# Patient Record
Sex: MALE | Race: WHITE | NOT HISPANIC OR LATINO | Employment: OTHER | URBAN - METROPOLITAN AREA
[De-identification: names, ages, dates, MRNs, and addresses within clinical notes are randomized per-mention and may not be internally consistent; named-entity substitution may affect disease eponyms.]

---

## 2017-03-20 DIAGNOSIS — Z12.5 ENCOUNTER FOR SCREENING FOR MALIGNANT NEOPLASM OF PROSTATE: ICD-10-CM

## 2017-03-22 ENCOUNTER — APPOINTMENT (OUTPATIENT)
Dept: LAB | Facility: CLINIC | Age: 82
End: 2017-03-22
Payer: MEDICARE

## 2017-03-22 ENCOUNTER — GENERIC CONVERSION - ENCOUNTER (OUTPATIENT)
Dept: OTHER | Facility: OTHER | Age: 82
End: 2017-03-22

## 2017-03-22 ENCOUNTER — TRANSCRIBE ORDERS (OUTPATIENT)
Dept: LAB | Facility: CLINIC | Age: 82
End: 2017-03-22

## 2017-03-22 DIAGNOSIS — Z12.5 ENCOUNTER FOR SCREENING FOR MALIGNANT NEOPLASM OF PROSTATE: ICD-10-CM

## 2017-03-22 LAB — PSA SERPL-MCNC: 3.1 NG/ML (ref 0–4)

## 2017-03-22 PROCEDURE — 36415 COLL VENOUS BLD VENIPUNCTURE: CPT

## 2017-03-22 PROCEDURE — G0103 PSA SCREENING: HCPCS

## 2017-04-05 ENCOUNTER — ALLSCRIPTS OFFICE VISIT (OUTPATIENT)
Dept: OTHER | Facility: OTHER | Age: 82
End: 2017-04-05

## 2017-04-10 ENCOUNTER — GENERIC CONVERSION - ENCOUNTER (OUTPATIENT)
Dept: OTHER | Facility: OTHER | Age: 82
End: 2017-04-10

## 2017-04-26 ENCOUNTER — GENERIC CONVERSION - ENCOUNTER (OUTPATIENT)
Dept: OTHER | Facility: OTHER | Age: 82
End: 2017-04-26

## 2017-06-09 ENCOUNTER — GENERIC CONVERSION - ENCOUNTER (OUTPATIENT)
Dept: OTHER | Facility: OTHER | Age: 82
End: 2017-06-09

## 2017-07-17 ENCOUNTER — ALLSCRIPTS OFFICE VISIT (OUTPATIENT)
Dept: OTHER | Facility: OTHER | Age: 82
End: 2017-07-17

## 2017-07-17 DIAGNOSIS — J84.9 INTERSTITIAL PULMONARY DISEASE (HCC): ICD-10-CM

## 2017-07-17 DIAGNOSIS — R05.9 COUGH: ICD-10-CM

## 2017-07-19 ENCOUNTER — TRANSCRIBE ORDERS (OUTPATIENT)
Dept: RADIOLOGY | Facility: CLINIC | Age: 82
End: 2017-07-19

## 2017-07-19 ENCOUNTER — APPOINTMENT (OUTPATIENT)
Dept: RADIOLOGY | Facility: CLINIC | Age: 82
End: 2017-07-19
Attending: DENTIST
Payer: MEDICARE

## 2017-07-19 DIAGNOSIS — R05.9 COUGH: ICD-10-CM

## 2017-07-19 PROCEDURE — 71020 HB CHEST X-RAY 2VW FRONTAL&LATL: CPT

## 2017-07-27 ENCOUNTER — HOSPITAL ENCOUNTER (OUTPATIENT)
Dept: RADIOLOGY | Age: 82
Discharge: HOME | End: 2017-07-27
Payer: MEDICARE

## 2017-07-27 DIAGNOSIS — J84.9 INTERSTITIAL PULMONARY DISEASE (HCC): ICD-10-CM

## 2017-07-27 PROCEDURE — 71250 CT THORAX DX C-: CPT

## 2017-07-31 ENCOUNTER — LAB CONVERSION - ENCOUNTER (OUTPATIENT)
Dept: OTHER | Facility: OTHER | Age: 82
End: 2017-07-31

## 2017-10-30 ENCOUNTER — GENERIC CONVERSION - ENCOUNTER (OUTPATIENT)
Dept: OTHER | Facility: OTHER | Age: 82
End: 2017-10-30

## 2017-12-18 ENCOUNTER — APPOINTMENT (OUTPATIENT)
Dept: LAB | Facility: CLINIC | Age: 82
End: 2017-12-18
Payer: MEDICARE

## 2017-12-18 ENCOUNTER — TRANSCRIBE ORDERS (OUTPATIENT)
Dept: LAB | Facility: CLINIC | Age: 82
End: 2017-12-18

## 2017-12-18 ENCOUNTER — GENERIC CONVERSION - ENCOUNTER (OUTPATIENT)
Dept: OTHER | Facility: OTHER | Age: 82
End: 2017-12-18

## 2017-12-18 DIAGNOSIS — R35.0 FREQUENCY OF MICTURITION: ICD-10-CM

## 2017-12-18 DIAGNOSIS — R79.89 OTHER SPECIFIED ABNORMAL FINDINGS OF BLOOD CHEMISTRY: ICD-10-CM

## 2017-12-18 DIAGNOSIS — R10.9 ABDOMINAL PAIN: ICD-10-CM

## 2017-12-18 PROCEDURE — 87086 URINE CULTURE/COLONY COUNT: CPT

## 2017-12-19 LAB — BACTERIA UR CULT: NORMAL

## 2018-01-05 ENCOUNTER — ALLSCRIPTS OFFICE VISIT (OUTPATIENT)
Dept: OTHER | Facility: OTHER | Age: 83
End: 2018-01-05

## 2018-01-05 ENCOUNTER — APPOINTMENT (OUTPATIENT)
Dept: LAB | Facility: CLINIC | Age: 83
End: 2018-01-05
Payer: MEDICARE

## 2018-01-05 ENCOUNTER — TRANSCRIBE ORDERS (OUTPATIENT)
Dept: LAB | Facility: CLINIC | Age: 83
End: 2018-01-05

## 2018-01-05 ENCOUNTER — APPOINTMENT (OUTPATIENT)
Dept: RADIOLOGY | Facility: CLINIC | Age: 83
End: 2018-01-05
Payer: MEDICARE

## 2018-01-05 DIAGNOSIS — R10.9 ABDOMINAL PAIN: ICD-10-CM

## 2018-01-05 LAB
ALBUMIN SERPL BCP-MCNC: 4 G/DL (ref 3.5–5)
ALP SERPL-CCNC: 49 U/L (ref 46–116)
ALT SERPL W P-5'-P-CCNC: 20 U/L (ref 12–78)
ANION GAP SERPL CALCULATED.3IONS-SCNC: 3 MMOL/L (ref 4–13)
AST SERPL W P-5'-P-CCNC: 12 U/L (ref 5–45)
BACTERIA UR QL AUTO: NORMAL /HPF
BILIRUB SERPL-MCNC: 0.6 MG/DL (ref 0.2–1)
BILIRUB UR QL STRIP: NEGATIVE
BUN SERPL-MCNC: 13 MG/DL (ref 5–25)
CALCIUM SERPL-MCNC: 9.8 MG/DL (ref 8.3–10.1)
CHLORIDE SERPL-SCNC: 106 MMOL/L (ref 100–108)
CLARITY UR: CLEAR
CO2 SERPL-SCNC: 31 MMOL/L (ref 21–32)
COLOR UR: YELLOW
CREAT SERPL-MCNC: 0.83 MG/DL (ref 0.6–1.3)
DEPRECATED D DIMER PPP: 511 NG/ML (FEU) (ref 0–424)
ERYTHROCYTE [DISTWIDTH] IN BLOOD BY AUTOMATED COUNT: 13.1 % (ref 11.6–15.1)
GFR SERPL CREATININE-BSD FRML MDRD: 83 ML/MIN/1.73SQ M
GLUCOSE SERPL-MCNC: 100 MG/DL (ref 65–140)
GLUCOSE UR STRIP-MCNC: NEGATIVE MG/DL
HCT VFR BLD AUTO: 42.3 % (ref 36.5–49.3)
HGB BLD-MCNC: 14.4 G/DL (ref 12–17)
HGB UR QL STRIP.AUTO: NEGATIVE
HYALINE CASTS #/AREA URNS LPF: NORMAL /LPF
KETONES UR STRIP-MCNC: NEGATIVE MG/DL
LEUKOCYTE ESTERASE UR QL STRIP: NEGATIVE
MCH RBC QN AUTO: 31.6 PG (ref 26.8–34.3)
MCHC RBC AUTO-ENTMCNC: 34 G/DL (ref 31.4–37.4)
MCV RBC AUTO: 93 FL (ref 82–98)
NITRITE UR QL STRIP: NEGATIVE
NON-SQ EPI CELLS URNS QL MICRO: NORMAL /HPF
PH UR STRIP.AUTO: 7.5 [PH] (ref 4.5–8)
PLATELET # BLD AUTO: 184 THOUSANDS/UL (ref 149–390)
PMV BLD AUTO: 8.6 FL (ref 8.9–12.7)
POTASSIUM SERPL-SCNC: 5.3 MMOL/L (ref 3.5–5.3)
PROT SERPL-MCNC: 7.9 G/DL (ref 6.4–8.2)
PROT UR STRIP-MCNC: NEGATIVE MG/DL
RBC # BLD AUTO: 4.56 MILLION/UL (ref 3.88–5.62)
RBC #/AREA URNS AUTO: NORMAL /HPF
SODIUM SERPL-SCNC: 140 MMOL/L (ref 136–145)
SP GR UR STRIP.AUTO: 1.01 (ref 1–1.03)
UROBILINOGEN UR QL STRIP.AUTO: 0.2 E.U./DL
WBC # BLD AUTO: 5.01 THOUSAND/UL (ref 4.31–10.16)
WBC #/AREA URNS AUTO: NORMAL /HPF

## 2018-01-05 PROCEDURE — 81001 URINALYSIS AUTO W/SCOPE: CPT

## 2018-01-05 PROCEDURE — 71046 X-RAY EXAM CHEST 2 VIEWS: CPT

## 2018-01-05 PROCEDURE — 80053 COMPREHEN METABOLIC PANEL: CPT

## 2018-01-05 PROCEDURE — 71100 X-RAY EXAM RIBS UNI 2 VIEWS: CPT

## 2018-01-05 PROCEDURE — 36415 COLL VENOUS BLD VENIPUNCTURE: CPT

## 2018-01-05 PROCEDURE — 85027 COMPLETE CBC AUTOMATED: CPT

## 2018-01-05 PROCEDURE — 85379 FIBRIN DEGRADATION QUANT: CPT

## 2018-01-06 NOTE — PROGRESS NOTES
Assessment   1  Flank pain (518 09) (R10 9)    Plan   Flank pain    · Nabumetone 750 MG Oral Tablet; TAKE 1 TABLET TWICE DAILY AS NEEDED FOR    PAIN   · (1) CBC/ PLT (NO DIFF); Status:Active; Requested HRY:[id number];    · (1) COMPREHENSIVE METABOLIC PANEL; Status:Active; Requested [id number];    · (1) D-DIMER; Status:Active; Requested SKQ:[id number];    · (1) URINALYSIS WITH MICROSCOPIC; Status:Active; Requested AML:[id number];    · * XR CHEST PA & LATERAL; Status:Active; Requested for:2018;    · * XR RIBS LEFT W PA CHEST MIN 3 VIEWS; Status:Active; Requested GHM:[id number];    · [facility]; Status:Hold For - Scheduling; Requested [id number]; Discussion/Summary      We will follow up in the next several weeks and see how things go  The patient was counseled regarding impressions  Chief Complaint   Pt presents today for left back, rib area pain  Pt states that he fell shoveling snow about 10 days ago but sneezed yesterday and pain started  History of Present Illness   HPI: The patient fell backward and struck his head and thoracic spine 10 days  He noted no injury at that time  He notes no stiffness or myalgia following shoveling  He states that he sneezed 2 days ago and noted pain in the left 10 th rib pain  The patient notes no edema of the lower extremities  He notes no changes in his bowels or N/V  He notes no hematuria and states no history of kidney stones  He has no SOB  The patient states that there are no other concerns at this time  Review of Systems        Constitutional: no fever,-- not feeling poorly,-- no recent weight gain,-- no chills-- and-- not feeling tired  ENT: no sore throat-- and-- no nasal discharge  Cardiovascular: chest pain, but-- no intermittent leg claudication,-- no palpitations-- and-- no lower extremity edema  Respiratory: no shortness of breath,-- no cough,-- no orthopnea-- and-- no shortness of breath during exertion  Gastrointestinal: no abdominal pain,-- no nausea,-- no vomiting-- and-- no diarrhea  Musculoskeletal: no arthralgias,-- no joint swelling,-- no limb pain-- and-- no joint stiffness  Neurological: no headache,-- no numbness,-- no tingling-- and-- no dizziness  Active Problems   1  Acute allergic reaction (995 3) (T78 40XA)   2  Anemia (285 9) (D64 9)   3  Benign colon polyp (211 3) (K63 5)   4  Benign essential hypertension (401 1) (I10)   5  Cataract (366 9) (H26 9)   6  Chronic cough (786 2) (R05)   7  Contact dermatitis (692 9) (L25 9)   8  Diverticulosis (562 10) (K57 90)   9  Encounter for prostate cancer screening (V76 44) (Z12 5)   10  Enlarged prostate without lower urinary tract symptoms (luts) (600 00) (N40 0)   11  Erectile dysfunction of non-organic origin (302 72) (F52 21)   12  Frequency (788 41) (R35 0)   13  Generalized osteoarthritis (715 00) (M15 9)   14  Hyperlipidemia (272 4) (E78 5)   15  Interstitial lung disease (515) (J84 9)   16  Lower Back Pain Chronic   17  Need for influenza vaccination (V04 81) (Z23)   18  Need for pneumococcal vaccination (V03 82) (Z23)   19  Need for tetanus booster (V03 7) (Z23)   20  Night sweats (780 8) (R61)   21  Paroxysmal atrial fibrillation (427 31) (I48 0)   22  Screening for depression (V79 0) (Z13 89)   23  Screening for diabetes mellitus (DM) (V77 1) (Z13 1)   24  Screening for genitourinary condition (V81 6) (Z13 89)   25  Screening for neurological condition (V80 09) (Z13 89)   26  Skin rash (782 1) (R21)    Past Medical History   Active Problems And Past Medical History Reviewed: The active problems and past medical history were reviewed and updated today  Surgical History   Surgical History Reviewed: The surgical history was reviewed and updated today  Social History    · Never A Smoker  The social history was reviewed and updated today  The social history was reviewed and is unchanged        Family History   Family History Reviewed: The family history was reviewed and updated today  Current Meds    1  Aspirin Low Dose 81 MG TABS; Take 1 tablet daily; Last Rx:26Zge0923 Ordered   2  Caltrate 600+D 600-400 MG-UNIT TABS; TAKE AS DIRECTED PER PACKAGE     INSTRUCTIONS; Last Rx:28Ytb3053 Ordered   3  Centrum Oral Tablet; TAKE 1 TABLET DAILY; Last Rx:25Hot0060 Ordered   4  Cialis 20 MG Oral Tablet; TAKE 1 TABLET DAILY 1 HOUR BEFORE NEEDED; Therapy: 09VDO3306 to (Evaluate:37Jum8674); Last Rx:11Yqc8495 Ordered   5  Co Q10 100 MG Oral Capsule; Take 1 capsule twice daily Recorded   6  Ensure LIQD; USE AS DIRECTED Recorded   7  Metoprolol Tartrate 25 MG Oral Tablet; TAKE 1 TABLET TWICE DAILY; Therapy: 74Bbm9356 to (Bee Galindo)  Requested for: 29HAG7615; Last     Rx:51Pcp5724 Ordered   8  Omega 3 1000 MG Oral Capsule; TAKE 2 CAPSULE Twice daily; Last Rx:90Cdx7643     Ordered   9  Ramipril 10 MG Oral Capsule; TAKE 2 CAPSULE ONCE DAILY; Therapy: 46Rpp3991 to (Evaluate:60Aga8074)  Requested for: 22PNI6011; Last     Rx:43Bss7526 Ordered   10  Simvastatin 40 MG Oral Tablet; take 1 tablet by mouth once daily; Therapy: 07DTD6185 to (Jadine Monique)  Requested for: 86VSM5166; Last      Rx:54Ooc8202 Ordered     The medication list was reviewed and updated today  Allergies   1  No Known Drug Allergies    Vitals    Recorded: 67PYJ4429 10:30AM   Temperature 96 4 F   Heart Rate 56   Respiration 16   Systolic 648   Diastolic 82   Height 5 ft 11 in   Weight 198 lb 6 oz   BMI Calculated 27 67   BSA Calculated 2 1   O2 Saturation 97     Physical Exam        Constitutional      General appearance: No acute distress, well appearing and well nourished  Eyes      Conjunctiva and lids: No swelling, erythema, or discharge  Ears, Nose, Mouth, and Throat      External inspection of ears and nose: Normal        Oropharynx: Normal with no erythema, edema, exudate or lesions         Pulmonary      Respiratory effort: No increased work of breathing or signs of respiratory distress  Auscultation of lungs: Clear to auscultation, equal breath sounds bilaterally, no wheezes, no rales, no rhonci  Auscultation of the lungs revealed no expiratory wheezing,-- normal expiratory time-- and-- no inspiratory wheezing  no rales or crackles were heard bilaterally  no rhonchi  no friction rub  no wheezing  no diminished breath sounds  no bronchial breath sounds  Cardiovascular      Auscultation of heart: Normal rate and rhythm, normal S1 and S2, without murmurs  Examination of extremities for edema and/or varicosities: Normal        Carotid pulses: Normal        Abdomen      Abdomen: Non-tender, no masses  Lymphatic      Palpation of lymph nodes in neck: No lymphadenopathy  Musculoskeletal      Gait and station: Normal        Inspection/palpation of joints, bones, and muscles: Abnormal  -- Pain with palpation of the left 9th rib at the posterior axillary line  Skin      Skin and subcutaneous tissue: Normal without rashes or lesions  Neurologic      Cranial nerves: Cranial nerves 2-12 intact         Psychiatric      Mood and affect: Normal           Future Appointments      Date/Time Provider Specialty Site   06/12/2018 09:30 AM Lavell Nyhan, MD Urology [address]   01/17/2018 09:00 AM hBumi Ring DO Internal Medicine [address] [facility]     Signatures    Electronically signed by : Lizeth Lopez DO; Jan 5 2018 11:14AM EST                       (Author)

## 2018-01-07 ENCOUNTER — GENERIC CONVERSION - ENCOUNTER (OUTPATIENT)
Dept: OTHER | Facility: OTHER | Age: 83
End: 2018-01-07

## 2018-01-12 VITALS
BODY MASS INDEX: 26.65 KG/M2 | HEIGHT: 71 IN | OXYGEN SATURATION: 95 % | HEART RATE: 50 BPM | WEIGHT: 190.38 LBS | TEMPERATURE: 97.1 F | DIASTOLIC BLOOD PRESSURE: 76 MMHG | SYSTOLIC BLOOD PRESSURE: 120 MMHG | RESPIRATION RATE: 16 BRPM

## 2018-01-13 VITALS
TEMPERATURE: 97.8 F | HEIGHT: 71 IN | BODY MASS INDEX: 26.95 KG/M2 | HEART RATE: 60 BPM | SYSTOLIC BLOOD PRESSURE: 134 MMHG | DIASTOLIC BLOOD PRESSURE: 82 MMHG | WEIGHT: 192.5 LBS | RESPIRATION RATE: 18 BRPM

## 2018-01-13 NOTE — RESULT NOTES
Verified Results  * CT CHEST HIGH RESOLUTION 54Rop5000 09:50AM Sania Holland Order Number: [id number]    - Patient Instructions: To schedule this appointment, please contact Central Scheduling at [phone]  Test Name Result Flag Reference   CT CHEST HIGH RESOLUTION (Report)     CT CHEST INCLUDING HIGH RESOLUTION SLICES - WITHOUT CONTRAST     INDICATION: Interstitial pulmonary disease  Cough for several months  The patient has a smoking history  COMPARISON: Chest series 7/19/2017  TECHNIQUE: CT examination of the chest was performed without intravenous contrast  Additional thin section images were performed at 10 mm intervals in supine and prone positioning in inspiration as well as supine in expiration  Reformatted images were    created in axial, sagittal, and coronal planes  Radiation dose length product (DLP) for this visit: 600 mGy-cm   This examination, like all CT scans performed in the [facility], was performed utilizing techniques to minimize radiation dose exposure, including the use of iterative    reconstruction and automated exposure control  FINDINGS:     LUNGS: No septal lobular thickening, bronchial wall thickening, bronchiectasis, diffuse nodularity, ground glass infiltrates or honeycombing to suggest interstitial lung disease  Minimal biapical pleural-parenchymal scarring  A 4 mm noncalcified    pleural-based nodule in the right middle lobe noted with an adjacent 3 mm nodule also identified  Based on current Fleischner Society 2017 Guidelines on incidental pulmonary nodule, because the patient is considered high risk for lung cancer, 12 month    follow-up non-contrast chest CT is recommended  PLEURA: Bilateral calcified pleural plaques  No pleural effusions  VESSELS: Calcified native coronary vessels noted  HEART: Unremarkable       MEDIASTINUM AND FAHAD: No pathologic lymphadenopathy with subcentimeter middle mediastinal lymph nodes identified  CHEST WALL AND LOWER NECK: Unremarkable  VISUALIZED STRUCTURES IN THE UPPER ABDOMEN: Unremarkable  OSSEOUS STRUCTURES: No acute fracture or destructive osseous lesion  IMPRESSION:     No CT evidence of interstitial lung disease  Several subcentimeter right middle lobe pulmonary nodules  12 month follow-up CT recommended  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

## 2018-01-17 NOTE — RESULT NOTES
Verified Results  (1) CBC/PLT/DIFF 22Mar2016 07:03REAGAN Ortega Range Order Number: [id number]    TW Order Number: [id number]     Test Name Result Flag Reference   WBC COUNT 5 52 Thousand/uL  4 31-10 16   RBC COUNT 4 78 Million/uL  3 88-5 62   HEMOGLOBIN 15 2 g/dL  12 0-17 0   HEMATOCRIT 44 0 %  36 5-49 3   MCV 92 fL  82-98   MCH 31 8 pg  26 8-34 3   MCHC 34 5 g/dL  31 4-37 4   RDW 13 1 %  11 6-15 1   MPV 9 4 fL  8 9-12 7   PLATELET COUNT 711 Thousands/uL  149-390   NEUTROPHILS RELATIVE PERCENT 58 %  43-75   LYMPHOCYTES RELATIVE PERCENT 30 %  14-44   MONOCYTES RELATIVE PERCENT 8 %  4-12   EOSINOPHILS RELATIVE PERCENT 4 %  0-6   BASOPHILS RELATIVE PERCENT 0 %  0-1   NEUTROPHILS ABSOLUTE COUNT 3 18 Thousands/µL  1 85-7 62   LYMPHOCYTES ABSOLUTE COUNT 1 65 Thousands/µL  0 60-4 47   MONOCYTES ABSOLUTE COUNT 0 45 Thousand/µL  0 17-1 22   EOSINOPHILS ABSOLUTE COUNT 0 22 Thousand/µL  0 00-0 61   BASOPHILS ABSOLUTE COUNT 0 02 Thousands/µL  0 00-0 10     (1) COMPREHENSIVE METABOLIC PANEL 89LUR7646 19:70LORNA Ortega Range Order Number: [id number]      National Kidney Disease Education Program recommendations are as follows:  GFR calculation is accurate only with a steady state creatinine  Chronic Kidney disease less than 60 ml/min/1 73 sq  meters  Kidney failure less than 15 ml/min/1 73 sq  meters  Test Name Result Flag Reference   GLUCOSE,RANDM 92 mg/dL     If the patient is fasting, the ADA then defines impaired fasting glucose as > 100 mg/dL and diabetes as > or equal to 123 mg/dL     SODIUM 144 mmol/L  136-145   POTASSIUM 4 3 mmol/L  3 5-5 3   CHLORIDE 106 mmol/L  100-108   CARBON DIOXIDE 31 mmol/L  21-32   ANION GAP (CALC) 7 mmol/L  4-13   BLOOD UREA NITROGEN 14 mg/dL  5-25   CREATININE 0 87 mg/dL  0 60-1 30   Standardized to IDMS reference method   CALCIUM 9 1 mg/dL  8 3-10 1   BILI, TOTAL 0 78 mg/dL  0 20-1 00   ALK PHOSPHATAS 44 U/L L    ALT (SGPT) 30 U/L  12-78   AST(SGOT) 18 U/L  5-45 ALBUMIN 4 3 g/dL  3 5-5 0   TOTAL PROTEIN 7 5 g/dL  6 4-8 2   eGFR Non-African American      >60 0 ml/min/1 73sq m     (1) LIPID PANEL, FASTING 22Mar2016 07:03AM Ankita Saravia Order Number: [id number]      Triglyceride:         Normal              <150 mg/dl       Borderline High    150-199 mg/dl       High               200-499 mg/dl       Very High          >499 mg/dl  Cholesterol:         Desirable        <200 mg/dl      Borderline High  200-239 mg/dl      High             >239 mg/dl  HDL Cholesterol:        High    >59 mg/dL      Low     <41 mg/dL     Test Name Result Flag Reference   CHOLESTEROL 133 mg/dL     HDL,DIRECT 34 mg/dL L 40-60   LDL CHOLESTEROL CALCULATED 81 mg/dL  0-100   TRIGLYCERIDES 92 mg/dL  <=150

## 2018-01-17 NOTE — RESULT NOTES
Verified Results  (1) PSA (SCREEN) (Dx V76 44 Screen for Prostate Cancer) 19KUG0418 09:46AM Brian Tang Order Number: [id number]     Test Name Result Flag Reference   PROSTATE SPECIFIC ANTIGEN 3 1 ng/mL  0 0-4 0   American Urological Association Guidelines define biochemical recurrence of prostate cancer as a detectable or rising PSA value post-radical prostatectomy that is greater than or equal to 0 2 ng/mL with a second confirmatory level of greater than or equal to 0 2 ng/mL  - Patient Instructions: This test is non-fasting  Please drink two glasses of water morning of bloodwork  - Patient Instructions: This test is non-fasting  Please drink two glasses of water morning of bloodwork

## 2018-01-18 NOTE — RESULT NOTES
Verified Results  (1) COMPREHENSIVE METABOLIC PANEL 23GAV2418 02:18QO Garrett Nyhan Order Number: [id number]  TW Order Number: [id number]     Test Name Result Flag Reference   GLUCOSE,RANDM 103 mg/dL     If the patient is fasting, the ADA then defines impaired fasting glucose as > 100 mg/dL and diabetes as > or equal to 123 mg/dL  SODIUM 139 mmol/L  136-145   POTASSIUM 3 9 mmol/L  3 5-5 3   CHLORIDE 106 mmol/L  100-108   CARBON DIOXIDE 28 mmol/L  21-32   ANION GAP (CALC) 5 mmol/L  4-13   BLOOD UREA NITROGEN 14 mg/dL  5-25   CREATININE 0 91 mg/dL  0 60-1 30   Standardized to IDMS reference method   CALCIUM 8 8 mg/dL  8 3-10 1   BILI, TOTAL 0 70 mg/dL  0 20-1 00   ALK PHOSPHATAS 49 U/L     ALT (SGPT) 21 U/L  12-78   AST(SGOT) 17 U/L  5-45   ALBUMIN 3 7 g/dL  3 5-5 0   TOTAL PROTEIN 7 2 g/dL  6 4-8 2   eGFR Non-African American      >60 0 ml/min/1 73sq m   - Patient Instructions: This is a fasting blood test  Water,black tea or black  coffee only after 9:00pm the night before test Drink 2 glasses of water the morning of test - Patient Instructions: This is a fasting blood test  Water,black tea or black    coffee only after 9:00pm the night before test Drink 2 glasses of water the morning of test   National Kidney Disease Education Program recommendations are as follows:  GFR calculation is accurate only with a steady state creatinine  Chronic Kidney disease less than 60 ml/min/1 73 sq  meters  Kidney failure less than 15 ml/min/1 73 sq  meters       (1) CBC/PLT/DIFF 06Oct2016 07:07AM Garrett Nyhan Order Number: [id number]  TW Order Number: [id number]     Test Name Result Flag Reference   WBC COUNT 5 43 Thousand/uL  4 31-10 16   RBC COUNT 4 68 Million/uL  3 88-5 62   HEMOGLOBIN 15 1 g/dL  12 0-17 0   HEMATOCRIT 43 1 %  36 5-49 3   MCV 92 fL  82-98   MCH 32 3 pg  26 8-34 3   MCHC 35 0 g/dL  31 4-37 4   RDW 13 4 %  11 6-15 1   MPV 9 4 fL  8 9-12 7   PLATELET COUNT 209 Thousands/uL  149-390   nRBC AUTOMATED 0 /100 WBCs     NEUTROPHILS RELATIVE PERCENT 50 %  43-75   LYMPHOCYTES RELATIVE PERCENT 36 %  14-44   MONOCYTES RELATIVE PERCENT 10 %  4-12   EOSINOPHILS RELATIVE PERCENT 3 %  0-6   BASOPHILS RELATIVE PERCENT 1 %  0-1   NEUTROPHILS ABSOLUTE COUNT 2 72 Thousands/?L  1 85-7 62   LYMPHOCYTES ABSOLUTE COUNT 1 97 Thousands/?L  0 60-4 47   MONOCYTES ABSOLUTE COUNT 0 54 Thousand/?L  0 17-1 22   EOSINOPHILS ABSOLUTE COUNT 0 16 Thousand/?L  0 00-0 61   BASOPHILS ABSOLUTE COUNT 0 03 Thousands/?L  0 00-0 10   - Patient Instructions: This bloodwork is non-fasting  Please drink two glasses of water morning of bloodwork  - Patient Instructions: This bloodwork is non-fasting  Please drink two glasses of water morning of bloodwork  - Patient Instructions: This bloodwork is non-fasting  Please drink two glasses of water morning of bloodwork  - Patient Instructions: This bloodwork is non-fasting  Please drink two glasses of water morning of bloodwork  (1) LIPID PANEL, FASTING 40WZI4853 07:07AM Hunterchristophe Jens Order Number: [id number]   Order Number: [id number]     Test Name Result Flag Reference   CHOLESTEROL 115 mg/dL     HDL,DIRECT 35 mg/dL L 40-60   Specimen collection should occur prior to Metamizole administration due to the potential for falsely depressed results  LDL CHOLESTEROL CALCULATED 66 mg/dL  0-100   - Patient Instructions: This is a fasting blood test  Water,black tea or black  coffee only after 9:00pm the night before test   Drink 2 glasses of water the morning of test    - Patient Instructions: This is a fasting blood test  Water,black tea or black  coffee only after 9:00pm the night before test   Drink 2 glasses of water the morning of test     - Patient Instructions:  This is a fasting blood test  Water,black tea or black  coffee only after 9:00pm the night before test Drink 2 glasses of water the morning of test - Patient Instructions: This is a fasting blood test  Water,black tea or black    coffee only after 9:00pm the night before test Drink 2 glasses of water the morning of test   Triglyceride:         Normal              <150 mg/dl       Borderline High    150-199 mg/dl       High               200-499 mg/dl       Very High          >499 mg/dl  Cholesterol:         Desirable        <200 mg/dl      Borderline High  200-239 mg/dl      High             >239 mg/dl  HDL Cholesterol:        High    >59 mg/dL      Low     <41 mg/dL  LDL CALCULATED:    This screening LDL is a calculated result  It does not have the accuracy of the Direct Measured LDL in the monitoring of patients with hyperlipidemia and/or statin therapy  Direct Measure LDL (KLD638) must be ordered separately in these patients  TRIGLYCERIDES 71 mg/dL  <=150   Specimen collection should occur prior to N-Acetylcysteine or Metamizole administration due to the potential for falsely depressed results

## 2018-01-22 VITALS
HEART RATE: 56 BPM | HEIGHT: 71 IN | RESPIRATION RATE: 16 BRPM | SYSTOLIC BLOOD PRESSURE: 170 MMHG | BODY MASS INDEX: 27.77 KG/M2 | WEIGHT: 198.38 LBS | DIASTOLIC BLOOD PRESSURE: 82 MMHG | OXYGEN SATURATION: 97 % | TEMPERATURE: 96.4 F

## 2018-01-23 NOTE — MISCELLANEOUS
Message  SPOKE TO WIFE, PT HAS HAD DORSALGIA FOR SEVERAL MONTHS, BUT NOW HAS FREQ AND URINE DISCOLORED  "BROWNISH" COLOR  DENIES DYSURIA OR FEVER  WILL GO FOR CULTURE TODAY AT [facility]  WILL DIRECT TO DR Siva Celis FOR ADDITIONAL ORDERS  LMOM DR Siva Celis AWAITING CULTURE RESULTS  PT TO CALL PRIOR WITH FEVER OR INCREASE SYMPTOMS  1        1 Amended By: Tiffany Frey; Dec 18 2017 3:25 PM EST    Active Problems   1  Acute allergic reaction (995 3) (T78 40XA)  2  Anemia (285 9) (D64 9)  3  Benign colon polyp (211 3) (K63 5)  4  Benign essential hypertension (401 1) (I10)  5  Cataract (366 9) (H26 9)  6  Chronic cough (786 2) (R05)  7  Contact dermatitis (692 9) (L25 9)  8  Diverticulosis (562 10) (K57 90)  9  Encounter for prostate cancer screening (V76 44) (Z12 5)  10  Enlarged prostate without lower urinary tract symptoms (luts) (600 00) (N40 0)  11  Erectile dysfunction of non-organic origin (302 72) (F52 21)  12  Frequency (788 41) (R35 0)  13  Generalized osteoarthritis (715 00) (M15 9)  14  Hyperlipidemia (272 4) (E78 5)  15  Interstitial lung disease (515) (J84 9)  16  Lower Back Pain Chronic  17  Need for influenza vaccination (V04 81) (Z23)  18  Need for pneumococcal vaccination (V03 82) (Z23)  19  Need for tetanus booster (V03 7) (Z23)  20  Night sweats (780 8) (R61)  21  Paroxysmal atrial fibrillation (427 31) (I48 0)  22  Screening for depression (V79 0) (Z13 89)  23  Screening for diabetes mellitus (DM) (V77 1) (Z13 1)  24  Screening for genitourinary condition (V81 6) (Z13 89)  25  Screening for neurological condition (V80 09) (Z13 89)  26  Skin rash (782 1) (R21)    Current Meds  1  Aspirin Low Dose 81 MG TABS; Take 1 tablet daily; Last Rx:07Uzs9137 Ordered  2  Caltrate 600+D 600-400 MG-UNIT TABS; TAKE AS DIRECTED PER PACKAGE   INSTRUCTIONS; Last Rx:37Ale0745 Ordered  3  Centrum Oral Tablet; TAKE 1 TABLET DAILY; Last Rx:08Slu7990 Ordered  4   Cialis 20 MG Oral Tablet; TAKE 1 TABLET DAILY 1 HOUR BEFORE NEEDED; Therapy: 92RBW8419 to (Evaluate:31Oxx7296); Last Rx:85Hra6369 Ordered  5  Co Q10 100 MG Oral Capsule; Take 1 capsule twice daily Recorded  6  Ensure LIQD; USE AS DIRECTED Recorded  7  Metoprolol Tartrate 25 MG Oral Tablet; TAKE 1 TABLET TWICE DAILY; Therapy: 86Wfb6188 to (Rajat Falcon)  Requested for: 44XUM2304; Last   Rx:22Vwh9116 Ordered  8  Omega 3 1000 MG Oral Capsule; TAKE 2 CAPSULE Twice daily; Last Rx:67Pjr5366   Ordered  9  Ramipril 10 MG Oral Capsule; TAKE 2 CAPSULE ONCE DAILY; Therapy: 14Apr2011 to (Evaluate:10Wls7613)  Requested for: 91FAO6537; Last   Rx:70Xos9664 Ordered  10  Simvastatin 40 MG Oral Tablet; take 1 tablet by mouth once daily; Therapy: 70GRE6912 to (Evaluate:60Hus3795)  Requested for: 90JVD4265; Last    Rx:96Vvk1362 Ordered    Allergies   1  No Known Drug Allergies    Plan  Frequency    · (1) URINE CULTURE; Source:Urine, Clean Catch; Status:Active - Retrospective  Authorization;  Requested for:94Iwa8528;     Signatures   Electronically signed by : Luis Alberto Owen RN; Dec 18 2017  3:26PM EST                       (Author)

## 2018-01-23 NOTE — RESULT NOTES
Verified Results  (1) URINALYSIS WITH MICROSCOPIC [id number] 11:45AM [facility] Sep Order Number: [id number]     Test Name Result Flag Reference   COLOR Yellow     CLARITY Clear     PH UA 7 5  4 5-8 0   LEUKOCYTE ESTERASE UA Negative  Negative   NITRITE UA Negative  Negative   PROTEIN UA Negative mg/dl  Negative   GLUCOSE UA Negative mg/dl  Negative   KETONES UA Negative mg/dl  Negative   UROBILINOGEN UA 0 2 E U /dl  0 2, 1 0 E U /dl   BILIRUBIN UA Negative  Negative   BLOOD UA Negative  Negative   SPECIFIC GRAVITY UA 1 009  1 003-1 030   WBC UA None Seen /hpf  None Seen, 0-5, 5-55, 5-65   RBC UA None Seen /hpf  None Seen, 0-5   HYALINE CASTS None Seen /lpf  None Seen   BACTERIA Occasional /hpf  None Seen, Occasional   EPITHELIAL CELLS None Seen /hpf  None Seen, Occasional     (1) D-DIMER [id number] 11:44AM [facility] Sep Order Number: [id number]     Test Name Result Flag Reference   D-DIMER 511 ng/ml (FEU) H 0-424   Reference and upper limits to exclude DVT and PE are the same  Do not use to exclude if clinical symptoms are present  (1) CBC/ PLT (NO DIFF) [id number] 11:44AM [facility] Sep Order Number: [id number]     Test Name Result Flag Reference   HEMATOCRIT 42 3 %  36 5-49 3   HEMOGLOBIN 14 4 g/dL  12 0-17 0   MCHC 34 0 g/dL  31 4-37 4   MCH 31 6 pg  26 8-34 3   MCV 93 fL  82-98   PLATELET COUNT 868 Thousands/uL  149-390   RBC COUNT 4 56 Million/uL  3 88-5 62   RDW 13 1 %  11 6-15 1   WBC COUNT 5 01 Thousand/uL  4 31-10 16   MPV 8 6 fL L 8 9-12 7     (1) COMPREHENSIVE METABOLIC PANEL [id number] 85:38QQ [facility] Sep Order Number: [id number]     Test Name Result Flag Reference   GLUCOSE,RANDM 100 mg/dL     If the patient is fasting, the ADA then defines impaired fasting glucose as > 100 mg/dL and diabetes as > or equal to 123 mg/dL    Specimen collection should occur prior to Sulfasalazine administration due to the potential for falsely depressed results  Specimen collection should occur prior to Sulfapyridine administration due to the potential for falsely elevated results  SODIUM 140 mmol/L  136-145   POTASSIUM 5 3 mmol/L  3 5-5 3   CHLORIDE 106 mmol/L  100-108   CARBON DIOXIDE 31 mmol/L  21-32   ANION GAP (CALC) 3 mmol/L L 4-13   BLOOD UREA NITROGEN 13 mg/dL  5-25   CREATININE 0 83 mg/dL  0 60-1 30   Standardized to IDMS reference method   CALCIUM 9 8 mg/dL  8 3-10 1   BILI, TOTAL 0 60 mg/dL  0 20-1 00   ALK PHOSPHATAS 49 U/L     ALT (SGPT) 20 U/L  12-78   Specimen collection should occur prior to Sulfasalazine and/or Sulfapyridine administration due to the potential for falsely depressed results  AST(SGOT) 12 U/L  5-45   Specimen collection should occur prior to Sulfasalazine administration due to the potential for falsely depressed results  ALBUMIN 4 0 g/dL  3 5-5 0   TOTAL PROTEIN 7 9 g/dL  6 4-8 2   eGFR 83 ml/min/1 73sq m     National Kidney Disease Education Program recommendations are as follows:  GFR calculation is accurate only with a steady state creatinine  Chronic Kidney disease less than 60 ml/min/1 73 sq  meters  Kidney failure less than 15 ml/min/1 73 sq  meters  [facility] CHEST PA & LATERAL [id number] 11:27AM Corinne Mo Order Number: [id number]     Test Name Result Flag Reference   XR CHEST PA & LATERAL (Report)     CHEST - DUAL ENERGY     INDICATION: R10 9: Unspecified abdominal pain  History taken directly from the electronic ordering system  COMPARISON: None     VIEWS: PA (including soft tissue/bone algorithms) and lateral projections     IMAGES: 4     FINDINGS:        Cardiomediastinal silhouette appears unremarkable  No focal airspace consolidation  No pleural effusion or pneumothorax  Visualized osseous structures appear within normal limits for the patient's age  IMPRESSION:     No active pulmonary disease         Workstation performed: [id number]     Signed by:   Ac Pfeiffer MD 1/5/18     XR RIBS 2 VIEW LEFT [id number] 11:27AM Vinayak Saenz Order Number: [id number]     Test Name Result Flag Reference   XR RIBS 2 VW LEFT (Report)     LEFT RIBS     INDICATION: Left rib pain  COMPARISON:  None     VIEWS: 3 views left hemithorax     IMAGES: 4     FINDINGS:     There is no fracture or pathologic bone lesion  Soft tissues are unremarkable  The visualized left hemithorax is unremarkable  There is no pneumothorax  IMPRESSION:     No displaced rib fracture         Workstation performed: [id number]     Signed by:   Cindi Osorio MD   1/5/18       Plan  D-dimer, elevated    · VAS LOWER LIMB VENOUS DUPLEX STUDY, COMPLETE BILATERAL; Status:Hold For  - Scheduling; Requested EVB:[id number];

## 2018-02-14 ENCOUNTER — OFFICE VISIT (OUTPATIENT)
Dept: INTERNAL MEDICINE CLINIC | Facility: CLINIC | Age: 83
End: 2018-02-14
Payer: MEDICARE

## 2018-02-14 VITALS
BODY MASS INDEX: 28.2 KG/M2 | SYSTOLIC BLOOD PRESSURE: 126 MMHG | HEIGHT: 70 IN | TEMPERATURE: 98.5 F | DIASTOLIC BLOOD PRESSURE: 62 MMHG | RESPIRATION RATE: 16 BRPM | HEART RATE: 58 BPM | WEIGHT: 197 LBS

## 2018-02-14 DIAGNOSIS — R04.2 HEMOPTYSIS: ICD-10-CM

## 2018-02-14 DIAGNOSIS — J01.10 ACUTE NON-RECURRENT FRONTAL SINUSITIS: Primary | ICD-10-CM

## 2018-02-14 PROCEDURE — 99213 OFFICE O/P EST LOW 20 MIN: CPT | Performed by: INTERNAL MEDICINE

## 2018-02-14 RX ORDER — GUAIFENESIN AND CODEINE PHOSPHATE 100; 10 MG/5ML; MG/5ML
5 SOLUTION ORAL 3 TIMES DAILY PRN
Qty: 120 ML | Refills: 0 | Status: SHIPPED | OUTPATIENT
Start: 2018-02-14 | End: 2018-08-16

## 2018-02-14 RX ORDER — CHLORAL HYDRATE 500 MG
1 CAPSULE ORAL 2 TIMES DAILY
COMMUNITY

## 2018-02-14 RX ORDER — SIMVASTATIN 40 MG
1 TABLET ORAL DAILY
COMMUNITY
Start: 2011-05-27 | End: 2018-02-22 | Stop reason: SDUPTHER

## 2018-02-14 RX ORDER — AMOXICILLIN AND CLAVULANATE POTASSIUM 875; 125 MG/1; MG/1
1 TABLET, FILM COATED ORAL EVERY 12 HOURS SCHEDULED
Qty: 20 TABLET | Refills: 0 | Status: SHIPPED | OUTPATIENT
Start: 2018-02-14 | End: 2018-02-16 | Stop reason: SDUPTHER

## 2018-02-14 RX ORDER — AMLODIPINE BESYLATE 5 MG/1
5 TABLET ORAL DAILY
Refills: 0 | COMMUNITY
Start: 2018-01-22

## 2018-02-14 RX ORDER — GUAIFENESIN 600 MG
1200 TABLET, EXTENDED RELEASE 12 HR ORAL EVERY 12 HOURS SCHEDULED
Qty: 20 TABLET | Refills: 0 | Status: SHIPPED | OUTPATIENT
Start: 2018-02-14 | End: 2018-08-16

## 2018-02-14 RX ORDER — TADALAFIL 20 MG/1
1 TABLET ORAL
COMMUNITY
Start: 2016-10-05 | End: 2018-05-09 | Stop reason: SDUPTHER

## 2018-02-14 RX ORDER — DIMENHYDRINATE 50 MG
1 TABLET ORAL DAILY
COMMUNITY

## 2018-02-14 RX ORDER — NABUMETONE 750 MG/1
1 TABLET, FILM COATED ORAL 2 TIMES DAILY PRN
COMMUNITY
Start: 2018-01-05 | End: 2018-02-16

## 2018-02-14 RX ORDER — RAMIPRIL 10 MG/1
CAPSULE ORAL DAILY
COMMUNITY
Start: 2011-04-14

## 2018-02-14 NOTE — PATIENT INSTRUCTIONS

## 2018-02-14 NOTE — PROGRESS NOTES
Assessment/Plan:    No problem-specific Assessment & Plan notes found for this encounter  Diagnoses and all orders for this visit:    Acute non-recurrent frontal sinusitis  -     amoxicillin-clavulanate (AUGMENTIN) 875-125 mg per tablet; Take 1 tablet by mouth every 12 (twelve) hours for 10 days  -     guaifenesin-codeine (GUAIFENESIN AC) 100-10 MG/5ML liquid; Take 5 mL by mouth 3 (three) times a day as needed for cough  -     guaiFENesin (MUCINEX) 600 mg 12 hr tablet; Take 2 tablets (1,200 mg total) by mouth every 12 (twelve) hours  -     fluticasone (VERAMYST) 27 5 MCG/SPRAY nasal spray; 2 sprays into each nostril daily    Hemoptysis    Other orders  -     amLODIPine (NORVASC) 2 5 mg tablet;   -     aspirin (ASPIRIN LOW DOSE) 81 MG tablet; Take 1 tablet by mouth daily  -     Calcium Carbonate-Vitamin D (CALTRATE 600+D) 600-400 MG-UNIT per tablet; Take by mouth  -     Multiple Vitamins-Minerals (CENTRUM ADULTS PO); Take 1 tablet by mouth daily  -     tadalafil (CIALIS) 20 MG tablet; Take 1 tablet by mouth  -     coenzyme Q-10 100 MG capsule; Take 1 capsule by mouth 2 (two) times a day  -     Nutritional Supplements ([address]) LIQD; Take by mouth Twice daily  -     metoprolol tartrate (LOPRESSOR) 25 mg tablet;   -     nabumetone (RELAFEN) 750 mg tablet; Take 1 tablet by mouth 2 (two) times a day as needed  -     Omega-3 1000 MG CAPS; Take 2 capsules by mouth 2 (two) times a day  -     ramipril (ALTACE) 10 MG capsule; Take by mouth  -     simvastatin (ZOCOR) 40 mg tablet; Take 1 tablet by mouth daily      A/P: Suspect hemoptysis is due to the infection  Treat URI and if persists, re-eval  Same with the TM on the left  No gtts and keep dry  Rest and increase po fluids  OTC motrin or tylenol prn  Will start abx, mucinex, and INS  RTC PRN  Subjective:      Patient ID: Hilario Shah is a [de-identified] y o  male  Non smoking WM presents with a one week h/o URI s/s  No travel, but wife is also ill   No fever or chills  Notes sore throat, PND, yellow nasal d/c, productive cough with occasional bloody sputum, and headaches  No muscle aches or SOB  Cough   Associated symptoms include postnasal drip, rhinorrhea and a sore throat  Pertinent negatives include no chest pain, chills, ear pain, eye redness, fever, headaches, myalgias, shortness of breath or wheezing  Sore Throat    Associated symptoms include congestion and coughing  Pertinent negatives include no abdominal pain, diarrhea, ear pain, headaches, shortness of breath or vomiting  Sinus Problem   Associated symptoms include congestion, coughing, sinus pressure and a sore throat  Pertinent negatives include no chills, diaphoresis, ear pain, headaches or shortness of breath  The following portions of the patient's history were reviewed and updated as appropriate:   He  has a past medical history of COPD (chronic obstructive pulmonary disease) ([address] ) and Hypertension  He  does not have a problem list on file  He  has no past surgical history on file  His family history includes No Known Problems in his father and mother  He  reports that he has never smoked  He has never used smokeless tobacco  He reports that he drinks about 0 6 - 1 2 oz of alcohol per week   He reports that he does not use drugs    Current Outpatient Prescriptions   Medication Sig Dispense Refill    amLODIPine (NORVASC) 2 5 mg tablet   0    aspirin (ASPIRIN LOW DOSE) 81 MG tablet Take 1 tablet by mouth daily      Calcium Carbonate-Vitamin D (CALTRATE 600+D) 600-400 MG-UNIT per tablet Take by mouth      coenzyme Q-10 100 MG capsule Take 1 capsule by mouth 2 (two) times a day      metoprolol tartrate (LOPRESSOR) 25 mg tablet   0    Multiple Vitamins-Minerals (CENTRUM ADULTS PO) Take 1 tablet by mouth daily      nabumetone (RELAFEN) 750 mg tablet Take 1 tablet by mouth 2 (two) times a day as needed      Nutritional Supplements ([address]) LIQD Take by mouth Twice daily      Omega-3 1000 MG CAPS Take 2 capsules by mouth 2 (two) times a day      ramipril (ALTACE) 10 MG capsule Take by mouth      simvastatin (ZOCOR) 40 mg tablet Take 1 tablet by mouth daily      tadalafil (CIALIS) 20 MG tablet Take 1 tablet by mouth      amoxicillin-clavulanate (AUGMENTIN) 875-125 mg per tablet Take 1 tablet by mouth every 12 (twelve) hours for 10 days 20 tablet 0    fluticasone (VERAMYST) 27 5 MCG/SPRAY nasal spray 2 sprays into each nostril daily 10 g 0    guaiFENesin (MUCINEX) 600 mg 12 hr tablet Take 2 tablets (1,200 mg total) by mouth every 12 (twelve) hours 20 tablet 0    guaifenesin-codeine (GUAIFENESIN AC) 100-10 MG/5ML liquid Take 5 mL by mouth 3 (three) times a day as needed for cough 120 mL 0     No current facility-administered medications for this visit  No current outpatient prescriptions on file prior to visit  No current facility-administered medications on file prior to visit  He has No Known Allergies       Review of Systems   Constitutional: Negative for activity change, chills, diaphoresis, fatigue and fever  HENT: Positive for congestion, postnasal drip, rhinorrhea, sinus pressure, sore throat and voice change  Negative for ear pain, facial swelling, nosebleeds and sinus pain  Eyes: Negative for redness  Respiratory: Positive for cough  Negative for chest tightness, shortness of breath and wheezing  Cardiovascular: Negative for chest pain, palpitations and leg swelling  Gastrointestinal: Negative for abdominal pain, constipation, diarrhea, nausea and vomiting  Genitourinary: Negative for difficulty urinating, dysuria and frequency  Musculoskeletal: Negative for arthralgias, gait problem and myalgias  Neurological: Negative for light-headedness and headaches  Psychiatric/Behavioral: Negative for confusion  The patient is not nervous/anxious            Objective:    Vitals:    02/14/18 1440   BP: 126/62   Pulse: 58   Resp: 16   Temp: 98 5 °F (36 9 °C)        Physical Exam   Constitutional: He is oriented to person, place, and time  He appears well-developed and well-nourished  No distress  HENT:   Head: Normocephalic and atraumatic  Right Ear: External ear normal    AS EAC patent with ??small TM perforation  Sinus tenderness noted with turbinates red and inflamed  Cobblestoning noted  Eyes: Conjunctivae and EOM are normal  Pupils are equal, round, and reactive to light  Cardiovascular: Normal rate, regular rhythm and normal heart sounds  Pulmonary/Chest: Effort normal and breath sounds normal  No respiratory distress  He has no wheezes  Abdominal: Soft  Bowel sounds are normal  There is no tenderness  Lymphadenopathy:     He has no cervical adenopathy  Neurological: He is alert and oriented to person, place, and time  Psychiatric: He has a normal mood and affect  His behavior is normal  Judgment and thought content normal    Nursing note and vitals reviewed

## 2018-02-15 PROBLEM — J84.9 INTERSTITIAL LUNG DISEASE (HCC): Status: ACTIVE | Noted: 2017-07-24

## 2018-02-15 PROBLEM — R79.89 D-DIMER, ELEVATED: Status: ACTIVE | Noted: 2018-01-07

## 2018-02-16 ENCOUNTER — OFFICE VISIT (OUTPATIENT)
Dept: INTERNAL MEDICINE CLINIC | Facility: CLINIC | Age: 83
End: 2018-02-16
Payer: MEDICARE

## 2018-02-16 VITALS
DIASTOLIC BLOOD PRESSURE: 80 MMHG | HEART RATE: 78 BPM | HEIGHT: 70 IN | SYSTOLIC BLOOD PRESSURE: 132 MMHG | BODY MASS INDEX: 27.69 KG/M2 | RESPIRATION RATE: 16 BRPM | WEIGHT: 193.4 LBS | OXYGEN SATURATION: 98 % | TEMPERATURE: 96.1 F

## 2018-02-16 DIAGNOSIS — Z12.5 SCREENING FOR PROSTATE CANCER: ICD-10-CM

## 2018-02-16 DIAGNOSIS — I10 BENIGN ESSENTIAL HYPERTENSION: ICD-10-CM

## 2018-02-16 DIAGNOSIS — I48.0 PAROXYSMAL ATRIAL FIBRILLATION (HCC): ICD-10-CM

## 2018-02-16 DIAGNOSIS — J01.10 ACUTE NON-RECURRENT FRONTAL SINUSITIS: ICD-10-CM

## 2018-02-16 DIAGNOSIS — Z13.220 SCREENING FOR HYPERLIPIDEMIA: ICD-10-CM

## 2018-02-16 DIAGNOSIS — E78.00 PURE HYPERCHOLESTEROLEMIA: Primary | ICD-10-CM

## 2018-02-16 PROBLEM — J44.9 COPD (CHRONIC OBSTRUCTIVE PULMONARY DISEASE) (HCC): Status: ACTIVE | Noted: 2018-02-16

## 2018-02-16 PROCEDURE — 99214 OFFICE O/P EST MOD 30 MIN: CPT | Performed by: INTERNAL MEDICINE

## 2018-02-16 RX ORDER — AMOXICILLIN AND CLAVULANATE POTASSIUM 875; 125 MG/1; MG/1
1 TABLET, FILM COATED ORAL EVERY 12 HOURS SCHEDULED
Qty: 20 TABLET | Refills: 0 | Status: SHIPPED | OUTPATIENT
Start: 2018-02-16 | End: 2018-02-26

## 2018-02-16 NOTE — PROGRESS NOTES
Assessment/Plan:    No problem-specific Assessment & Plan notes found for this encounter  Diagnoses and all orders for this visit:    Pure hypercholesterolemia    Paroxysmal atrial fibrillation (HCC)    Benign essential hypertension    Acute non-recurrent frontal sinusitis  -     amoxicillin-clavulanate (AUGMENTIN) 875-125 mg per tablet; Take 1 tablet by mouth every 12 (twelve) hours for 10 days    Screening for hyperlipidemia  -     Lipid Panel with Direct LDL reflex; Future    Screening for prostate cancer  -     PSA; Future    Other orders  -     Cancel: PSA, total and free          Subjective:      Patient ID: Nadine Acuna is a [de-identified] y o  male  The patient was seen examined  The patient is noted to have continued issues with sinusitis  We will send additional antibiotics to the pharmacy  The patient states that he is otherwise doing well  He notes that he is tolerating his current blood pressure medications  His blood pressure today is 132/80 and is noted to be mildly elevated  However will make no changes  Patient notes tolerance of his simvastatin  He notes no issues with myalgias  Will follow up on his LDL cholesterol with repeat labs today  Patient states that he has no issues with his breathing and is questioning the diagnosis of ILD and COPD  Will discontinue these from the chart secondary to a negative chest x-ray  The patient is atrial fibrillation continues to [facility]  He takes a daily aspirin  Hypertension   This is a chronic problem  The current episode started more than 1 year ago  The problem is controlled  Pertinent negatives include no chest pain, headaches, palpitations or shortness of breath  There are no known risk factors for coronary artery disease  Past treatments include ACE inhibitors  The current treatment provides moderate improvement  There are no compliance problems    There is no history of angina, kidney disease, CAD/MI, heart failure or left ventricular hypertrophy  The following portions of the patient's history were reviewed and updated as appropriate:   He  has a past medical history of Anemia (12/10/2012)  He  does not have any pertinent problems on file  He  has a past surgical history that includes Hernia repair  His family history includes Colon cancer in his family; Coronary artery disease in his mother; No Known Problems in his father  He  reports that he has never smoked  He has never used smokeless tobacco  He reports that he drinks about 0 6 - 1 2 oz of alcohol per week   He reports that he does not use drugs    Current Outpatient Prescriptions   Medication Sig Dispense Refill    amLODIPine (NORVASC) 2 5 mg tablet   0    amoxicillin-clavulanate (AUGMENTIN) 875-125 mg per tablet Take 1 tablet by mouth every 12 (twelve) hours for 10 days 20 tablet 0    aspirin (ASPIRIN LOW DOSE) 81 MG tablet Take 1 tablet by mouth daily      Calcium Carbonate-Vitamin D (CALTRATE 600+D) 600-400 MG-UNIT per tablet Take by mouth      coenzyme Q-10 100 MG capsule Take 1 capsule by mouth 2 (two) times a day      fluticasone (VERAMYST) 27 5 MCG/SPRAY nasal spray 2 sprays into each nostril daily 10 g 0    guaiFENesin (MUCINEX) 600 mg 12 hr tablet Take 2 tablets (1,200 mg total) by mouth every 12 (twelve) hours 20 tablet 0    guaifenesin-codeine (GUAIFENESIN AC) 100-10 MG/5ML liquid Take 5 mL by mouth 3 (three) times a day as needed for cough 120 mL 0    metoprolol tartrate (LOPRESSOR) 25 mg tablet   0    Multiple Vitamins-Minerals (CENTRUM ADULTS PO) Take 1 tablet by mouth daily      Nutritional Supplements ([address]) LIQD Take by mouth Twice daily      Omega-3 1000 MG CAPS Take 2 capsules by mouth 2 (two) times a day      ramipril (ALTACE) 10 MG capsule Take by mouth      simvastatin (ZOCOR) 40 mg tablet Take 1 tablet by mouth daily      tadalafil (CIALIS) 20 MG tablet Take 1 tablet by mouth       No current facility-administered medications for this visit  Current Outpatient Prescriptions on File Prior to Visit   Medication Sig    amLODIPine (NORVASC) 2 5 mg tablet     aspirin (ASPIRIN LOW DOSE) 81 MG tablet Take 1 tablet by mouth daily    Calcium Carbonate-Vitamin D (CALTRATE 600+D) 600-400 MG-UNIT per tablet Take by mouth    coenzyme Q-10 100 MG capsule Take 1 capsule by mouth 2 (two) times a day    fluticasone (VERAMYST) 27 5 MCG/SPRAY nasal spray 2 sprays into each nostril daily    guaiFENesin (MUCINEX) 600 mg 12 hr tablet Take 2 tablets (1,200 mg total) by mouth every 12 (twelve) hours    guaifenesin-codeine (GUAIFENESIN AC) 100-10 MG/5ML liquid Take 5 mL by mouth 3 (three) times a day as needed for cough    metoprolol tartrate (LOPRESSOR) 25 mg tablet     Multiple Vitamins-Minerals (CENTRUM ADULTS PO) Take 1 tablet by mouth daily    Nutritional Supplements ([address]) LIQD Take by mouth Twice daily    Omega-3 1000 MG CAPS Take 2 capsules by mouth 2 (two) times a day    ramipril (ALTACE) 10 MG capsule Take by mouth    simvastatin (ZOCOR) 40 mg tablet Take 1 tablet by mouth daily    tadalafil (CIALIS) 20 MG tablet Take 1 tablet by mouth    [DISCONTINUED] amoxicillin-clavulanate (AUGMENTIN) 875-125 mg per tablet Take 1 tablet by mouth every 12 (twelve) hours for 10 days    [DISCONTINUED] nabumetone (RELAFEN) 750 mg tablet Take 1 tablet by mouth 2 (two) times a day as needed     No current facility-administered medications on file prior to visit  He has No Known Allergies       Review of Systems   Constitutional: Negative for chills, fatigue and fever  HENT: Negative for postnasal drip, sinus pain, sinus pressure and sore throat  Respiratory: Negative for cough, chest tightness and shortness of breath  Cardiovascular: Negative for chest pain and palpitations  Gastrointestinal: Negative for abdominal pain, constipation, diarrhea, nausea and vomiting     Genitourinary: Negative  Musculoskeletal: Negative for arthralgias and myalgias  Skin: Negative  Neurological: Negative for headaches  Psychiatric/Behavioral: Negative  Objective:    Vitals:    02/16/18 0911   BP: 132/80   Pulse: 78   Resp: 16   Temp: (!) 96 1 °F (35 6 °C)   SpO2: 98%        Physical Exam   Constitutional: He is oriented to person, place, and time  He appears well-developed and well-nourished  HENT:   Head: Normocephalic and atraumatic  Nose: No rhinorrhea  Mouth/Throat: Posterior oropharyngeal erythema present  Eyes: EOM are normal  Pupils are equal, round, and reactive to light  Cardiovascular: Normal rate and regular rhythm  Pulmonary/Chest: Effort normal and breath sounds normal    Abdominal: Soft  Musculoskeletal: Normal range of motion  Neurological: He is alert and oriented to person, place, and time  Skin: Skin is warm and dry  Psychiatric: He has a normal mood and affect

## 2018-02-19 ENCOUNTER — APPOINTMENT (OUTPATIENT)
Dept: LAB | Facility: CLINIC | Age: 83
End: 2018-02-19
Payer: MEDICARE

## 2018-02-19 ENCOUNTER — TRANSCRIBE ORDERS (OUTPATIENT)
Dept: LAB | Facility: CLINIC | Age: 83
End: 2018-02-19

## 2018-02-19 DIAGNOSIS — Z12.5 SCREENING FOR PROSTATE CANCER: ICD-10-CM

## 2018-02-19 DIAGNOSIS — Z13.220 SCREENING FOR HYPERLIPIDEMIA: ICD-10-CM

## 2018-02-19 LAB
CHOLEST SERPL-MCNC: 111 MG/DL (ref 50–200)
HDLC SERPL-MCNC: 31 MG/DL (ref 40–60)
LDLC SERPL CALC-MCNC: 59 MG/DL (ref 0–100)
PSA SERPL-MCNC: 3.2 NG/ML (ref 0–4)
TRIGL SERPL-MCNC: 107 MG/DL

## 2018-02-19 PROCEDURE — 80061 LIPID PANEL: CPT

## 2018-02-19 PROCEDURE — 36415 COLL VENOUS BLD VENIPUNCTURE: CPT

## 2018-02-19 PROCEDURE — G0103 PSA SCREENING: HCPCS

## 2018-02-22 ENCOUNTER — TRANSITIONAL CARE MANAGEMENT (OUTPATIENT)
Dept: INTERNAL MEDICINE CLINIC | Facility: CLINIC | Age: 83
End: 2018-02-22

## 2018-02-22 DIAGNOSIS — E78.5 HYPERLIPIDEMIA, UNSPECIFIED HYPERLIPIDEMIA TYPE: Primary | ICD-10-CM

## 2018-02-22 RX ORDER — SIMVASTATIN 40 MG
40 TABLET ORAL DAILY
Qty: 90 TABLET | Refills: 3 | Status: SHIPPED | OUTPATIENT
Start: 2018-02-22 | End: 2019-02-27 | Stop reason: SDUPTHER

## 2018-02-22 NOTE — TELEPHONE ENCOUNTER
Needs refill on simvastatin 40 mg  Take 1 QD  Pharmacy [facility] [address]    [phone]  Please advise

## 2018-05-09 ENCOUNTER — TELEPHONE (OUTPATIENT)
Dept: INTERNAL MEDICINE CLINIC | Facility: CLINIC | Age: 83
End: 2018-05-09

## 2018-05-09 DIAGNOSIS — N52.9 ERECTILE DYSFUNCTION, UNSPECIFIED ERECTILE DYSFUNCTION TYPE: Primary | ICD-10-CM

## 2018-05-09 RX ORDER — TADALAFIL 20 MG/1
20 TABLET ORAL DAILY PRN
Qty: 16 TABLET | Refills: 0 | Status: SHIPPED | OUTPATIENT
Start: 2018-05-09 | End: 2019-03-29 | Stop reason: ALTCHOICE

## 2018-06-12 ENCOUNTER — OFFICE VISIT (OUTPATIENT)
Dept: UROLOGY | Facility: MEDICAL CENTER | Age: 83
End: 2018-06-12
Payer: MEDICARE

## 2018-06-12 VITALS
SYSTOLIC BLOOD PRESSURE: 130 MMHG | HEIGHT: 70 IN | DIASTOLIC BLOOD PRESSURE: 76 MMHG | WEIGHT: 193 LBS | BODY MASS INDEX: 27.63 KG/M2

## 2018-06-12 DIAGNOSIS — N13.8 BPH WITH OBSTRUCTION/LOWER URINARY TRACT SYMPTOMS: Primary | ICD-10-CM

## 2018-06-12 DIAGNOSIS — N40.1 BPH WITH OBSTRUCTION/LOWER URINARY TRACT SYMPTOMS: Primary | ICD-10-CM

## 2018-06-12 PROBLEM — M54.50 LOW BACK PAIN: Status: ACTIVE | Noted: 2018-06-12

## 2018-06-12 PROBLEM — M65.30 ACQUIRED TRIGGER FINGER: Status: ACTIVE | Noted: 2018-06-12

## 2018-06-12 LAB
SL AMB  POCT GLUCOSE, UA: NORMAL
SL AMB LEUKOCYTE ESTERASE,UA: NORMAL
SL AMB POCT BILIRUBIN,UA: NORMAL
SL AMB POCT BLOOD,UA: NORMAL
SL AMB POCT CLARITY,UA: CLEAR
SL AMB POCT COLOR,UA: YELLOW
SL AMB POCT KETONES,UA: NORMAL
SL AMB POCT NITRITE,UA: NORMAL
SL AMB POCT PH,UA: 6
SL AMB POCT SPECIFIC GRAVITY,UA: 1
SL AMB POCT URINE PROTEIN: NORMAL
SL AMB POCT UROBILINOGEN: 0.2

## 2018-06-12 PROCEDURE — 81003 URINALYSIS AUTO W/O SCOPE: CPT | Performed by: UROLOGY

## 2018-06-12 PROCEDURE — 99214 OFFICE O/P EST MOD 30 MIN: CPT | Performed by: UROLOGY

## 2018-06-12 NOTE — ASSESSMENT & PLAN NOTE
AUA symptom score is 15  PSA was 3 2 in February 2018  Urinalysis is negative  He wished to have another PSA level done in February 2019  We did discuss treatment options for his lower tract symptoms  He is generally satisfied with his voiding pattern  He was hesitant to start medication  I recommended he try behavioral techniques such as limiting his fluid intake prior to trips  If all is well he will return in 1 year

## 2018-06-12 NOTE — PATIENT INSTRUCTIONS
Benign Prostatic Hypertrophy   WHAT YOU NEED TO KNOW:   Benign prostatic hypertrophy (BPH) is a condition that causes your prostate gland to grow larger than normal  The prostate gland is the male sex gland that produces a fluid that is part of semen  It is about the size of a walnut and it is located under the bladder  As the prostate grows, it can squeeze the urethra  This can block urine flow and cause urinary problems  DISCHARGE INSTRUCTIONS:   Medicines:   · Alpha blockers: This medicine relaxes the muscles in your prostate and bladder  It may help you urinate more easily  · 5 alpha reductase inhibitors: These medicines block the production of a hormone that causes the prostate to get larger  It may help slow the growth of the prostate or shrink the prostate  · Take your medicine as directed  Contact your healthcare provider if you think your medicine is not helping or if you have side effects  Tell him or her if you are allergic to any medicine  Keep a list of the medicines, vitamins, and herbs you take  Include the amounts, and when and why you take them  Bring the list or the pill bottles to follow-up visits  Carry your medicine list with you in case of an emergency  Follow up with your healthcare provider as directed:  Write down your questions so you remember to ask them during your visits  Manage BPH:   · Do not let your bladder get too full before you empty it  Urinate when you feel the urge  · Limit alcohol  Do not drink large amounts of any liquid at one time  · Decrease the amount of salt you eat  Examples of salty foods are chips, cured meats, and canned soups  Do not use table salt  · Healthcare providers may tell you not to eat spicy foods such as chilli peppers  This may help you find out if spicy food makes your BPH symptoms worse  · You may have sex if you feel well  Contact your healthcare provider if:   · There is a large amount of blood in your urine  · Your signs and symptoms get worse  · You have a fever  · You have questions or concerns about your condition or care  Seek care immediately if:   · You are unable to urinate  · Your bladder feels very full and painful  © 2017 [address] [employer] Information is for End User's use only and may not be sold, redistributed or otherwise used for commercial purposes  All illustrations and images included in CareNotes® are the copyrighted property of [employer]  or [address]  The above information is an  only  It is not intended as medical advice for individual conditions or treatments  Talk to your doctor, nurse or pharmacist before following any medical regimen to see if it is safe and effective for you

## 2018-06-12 NOTE — LETTER
June 12, 2018     [facility] , [address] [facility] [address]    Patient: Ramy Golden   YOB: 1935   Date of Visit: 6/12/2018       Dear Dr Maira Salinas: Thank you for referring Brittni Mcarthur to me for evaluation  Below are my notes for this consultation  If you have questions, please do not hesitate to call me  I look forward to following your patient along with you  Sincerely,        Korin Chiu MD        CC: No Recipients  Korin Chiu MD  6/12/2018  9:50 AM  Sign at close encounter  Assessment/Plan:    BPH with obstruction/lower urinary tract symptoms  AUA symptom score is 15  PSA was 3 2 in February 2018  Urinalysis is negative  He wished to have another PSA level done in February 2019  We did discuss treatment options for his lower tract symptoms  He is generally satisfied with his voiding pattern  He was hesitant to start medication  I recommended he try behavioral techniques such as limiting his fluid intake prior to trips  If all is well he will return in 1 year  Combined arterial insufficiency and corporo-venous occlusive erectile dysfunction  He is satisfied with the use of cialis  20mg  Diagnoses and all orders for this visit:    BPH with obstruction/lower urinary tract symptoms  -     POCT urine dip auto non-scope          Subjective:      Patient ID: Ramy Golden is a [de-identified] y o  male  29-year-old male followed for lower tract symptoms and erectile dysfunction  He reports that he generally voids well  His stream is adequate and he feels he empties well  He gets up once or twice a night to urinate  He does void frequently at times  He notes he is a coffee drinker  He has occasional urgency and no incontinence  There is no gross hematuria, dysuria or history of urinary tract infection  Overall he is satisfied with his voiding pattern  He reports that Cialis is working well for erectile dysfunction          The following portions of the patient's history were reviewed and updated as appropriate: allergies, current medications, past family history, past medical history, past social history, past surgical history and problem list     Review of Systems   Constitutional: Negative for chills, diaphoresis, fatigue and fever  HENT: Negative  Eyes: Negative  Respiratory: Negative  Cardiovascular: Negative  Gastrointestinal: Negative  Endocrine: Negative  Musculoskeletal: Negative  Skin: Negative  Allergic/Immunologic: Negative  Neurological: Negative  Hematological: Negative  Psychiatric/Behavioral: Negative  Objective:      /76 (BP Location: Left arm, Patient Position: Sitting)   Ht 5' 10" (1 778 m)   Wt 87 5 kg (193 lb)   BMI 27 69 kg/m²           Physical Exam   Constitutional: He is oriented to person, place, and time  He appears well-developed and well-nourished  HENT:   Head: Normocephalic and atraumatic  Eyes: Conjunctivae are normal    Neck: Neck supple  Cardiovascular: Normal rate  Pulmonary/Chest: Effort normal    Abdominal: Soft  Bowel sounds are normal  He exhibits no distension and no mass  There is no tenderness  There is no rebound, no guarding and no CVA tenderness  Hernia confirmed negative in the right inguinal area and confirmed negative in the left inguinal area  Genitourinary: Rectum normal, testes normal and penis normal  Prostate is enlarged  Prostate is not tender  Right testis shows no mass  Left testis shows no mass  No phimosis or hypospadias  Genitourinary Comments: Prostate 2+ enlarged and palpably benign  Musculoskeletal: Normal range of motion  He exhibits no edema  Neurological: He is alert and oriented to person, place, and time  Skin: Skin is warm and dry  Psychiatric: He has a normal mood and affect  His behavior is normal  Judgment and thought content normal    Vitals reviewed

## 2018-06-12 NOTE — PROGRESS NOTES
Assessment/Plan:    BPH with obstruction/lower urinary tract symptoms  AUA symptom score is 15  PSA was 3 2 in February 2018  Urinalysis is negative  He wished to have another PSA level done in February 2019  We did discuss treatment options for his lower tract symptoms  He is generally satisfied with his voiding pattern  He was hesitant to start medication  I recommended he try behavioral techniques such as limiting his fluid intake prior to trips  If all is well he will return in 1 year  Combined arterial insufficiency and corporo-venous occlusive erectile dysfunction  He is satisfied with the use of cialis  20mg  Diagnoses and all orders for this visit:    BPH with obstruction/lower urinary tract symptoms  -     POCT urine dip auto non-scope          Subjective:      Patient ID: Elsy Milan is a [de-identified] y o  male  33-year-old male followed for lower tract symptoms and erectile dysfunction  He reports that he generally voids well  His stream is adequate and he feels he empties well  He gets up once or twice a night to urinate  He does void frequently at times  He notes he is a coffee drinker  He has occasional urgency and no incontinence  There is no gross hematuria, dysuria or history of urinary tract infection  Overall he is satisfied with his voiding pattern  He reports that Cialis is working well for erectile dysfunction  The following portions of the patient's history were reviewed and updated as appropriate: allergies, current medications, past family history, past medical history, past social history, past surgical history and problem list     Review of Systems   Constitutional: Negative for chills, diaphoresis, fatigue and fever  HENT: Negative  Eyes: Negative  Respiratory: Negative  Cardiovascular: Negative  Gastrointestinal: Negative  Endocrine: Negative  Musculoskeletal: Negative  Skin: Negative  Allergic/Immunologic: Negative      Neurological: Negative  Hematological: Negative  Psychiatric/Behavioral: Negative  Objective:      /76 (BP Location: Left arm, Patient Position: Sitting)   Ht 5' 10" (1 778 m)   Wt 87 5 kg (193 lb)   BMI 27 69 kg/m²          Physical Exam   Constitutional: He is oriented to person, place, and time  He appears well-developed and well-nourished  HENT:   Head: Normocephalic and atraumatic  Eyes: Conjunctivae are normal    Neck: Neck supple  Cardiovascular: Normal rate  Pulmonary/Chest: Effort normal    Abdominal: Soft  Bowel sounds are normal  He exhibits no distension and no mass  There is no tenderness  There is no rebound, no guarding and no CVA tenderness  Hernia confirmed negative in the right inguinal area and confirmed negative in the left inguinal area  Genitourinary: Rectum normal, testes normal and penis normal  Prostate is enlarged  Prostate is not tender  Right testis shows no mass  Left testis shows no mass  No phimosis or hypospadias  Genitourinary Comments: Prostate 2+ enlarged and palpably benign  Musculoskeletal: Normal range of motion  He exhibits no edema  Neurological: He is alert and oriented to person, place, and time  Skin: Skin is warm and dry  Psychiatric: He has a normal mood and affect  His behavior is normal  Judgment and thought content normal    Vitals reviewed

## 2018-08-16 ENCOUNTER — OFFICE VISIT (OUTPATIENT)
Dept: INTERNAL MEDICINE CLINIC | Facility: CLINIC | Age: 83
End: 2018-08-16
Payer: MEDICARE

## 2018-08-16 VITALS
HEIGHT: 70 IN | SYSTOLIC BLOOD PRESSURE: 150 MMHG | DIASTOLIC BLOOD PRESSURE: 84 MMHG | RESPIRATION RATE: 16 BRPM | TEMPERATURE: 97.8 F | OXYGEN SATURATION: 96 % | HEART RATE: 56 BPM | WEIGHT: 193 LBS | BODY MASS INDEX: 27.63 KG/M2

## 2018-08-16 DIAGNOSIS — I10 BENIGN ESSENTIAL HYPERTENSION: ICD-10-CM

## 2018-08-16 DIAGNOSIS — E78.00 PURE HYPERCHOLESTEROLEMIA: ICD-10-CM

## 2018-08-16 DIAGNOSIS — I48.0 PAROXYSMAL ATRIAL FIBRILLATION (HCC): Primary | ICD-10-CM

## 2018-08-16 PROCEDURE — G0439 PPPS, SUBSEQ VISIT: HCPCS | Performed by: INTERNAL MEDICINE

## 2018-08-16 PROCEDURE — 99214 OFFICE O/P EST MOD 30 MIN: CPT | Performed by: INTERNAL MEDICINE

## 2018-08-16 NOTE — PROGRESS NOTES
Assessment and Plan:    Problem List Items Addressed This Visit     None        Health Maintenance Due   Topic Date Due    PNEUMOCOCCAL POLYSACCHARIDE VACCINE AGE 72 AND OVER  08/05/2002    GLAUCOMA SCREENING 72 + YR  08/05/2004         HPI:  Hina Raygoza is a 82 y o  male here for his Subsequent Wellness Visit      Patient Active Problem List   Diagnosis    Benign colon polyp    Benign essential hypertension    D-dimer, elevated    Combined arterial insufficiency and corporo-venous occlusive erectile dysfunction    Pure hypercholesterolemia    Paroxysmal atrial fibrillation (HCC)    Diverticulosis    Acquired trigger finger    Low back pain    BPH with obstruction/lower urinary tract symptoms     Past Medical History:   Diagnosis Date    Anemia 12/10/2012    Atrial fibrillation ([address] )     BPH with obstruction/lower urinary tract symptoms     Cataract     Comb artrl insuff & corporo-venous occlusv erectile dysfnct     Frequency of micturition     Hypercholesteremia     Hypertension     Poor urinary stream      Past Surgical History:   Procedure Laterality Date    HERNIA REPAIR       Family History   Problem Relation Age of Onset    Coronary artery disease Mother     No Known Problems Father     Colon cancer Family      History   Smoking Status    Never Smoker   Smokeless Tobacco    Never Used     History   Alcohol Use    0 6 - 1 2 oz/week    1 - 2 Glasses of wine per week     Comment: weekly      History   Drug Use No       Current Outpatient Prescriptions   Medication Sig Dispense Refill    amLODIPine (NORVASC) 2 5 mg tablet daily    0    aspirin (ASPIRIN LOW DOSE) 81 MG tablet Take 1 tablet by mouth daily      Calcium Carbonate-Vitamin D (CALTRATE 600+D) 600-400 MG-UNIT per tablet Take by mouth      coenzyme Q-10 100 MG capsule Take 1 capsule by mouth daily        metoprolol tartrate (LOPRESSOR) 25 mg tablet daily    0    Multiple Vitamins-Minerals (CENTRUM ADULTS PO) Take 1 tablet by mouth daily      Omega-3 1000 MG CAPS Take 1 capsule by mouth 2 (two) times a day        ramipril (ALTACE) 10 MG capsule Take by mouth daily        simvastatin (ZOCOR) 40 mg tablet Take 1 tablet (40 mg total) by mouth daily 90 tablet 3    tadalafil (CIALIS) 20 MG tablet Take 1 tablet (20 mg total) by mouth daily as needed for erectile dysfunction 16 tablet 0     No current facility-administered medications for this visit  No Known Allergies  Immunization History   Administered Date(s) Administered    Influenza 09/30/2015    Influenza Quadrivalent Preservative Free 3 years and older IM 09/30/2015    Influenza Split High Dose Preservative Free IM 10/05/2016, 10/09/2017    Influenza TIV (IM) 10/03/2012, 10/02/2013    Pneumococcal Conjugate 13-Valent 03/21/2016, 04/05/2017    Tdap 03/02/2015       Patient Care Team:  Jaqueline Morton DO as PCP - General    Medicare Screening Tests and Risk Assessments:  Last Medicare Wellness visit information reviewed, patient interviewed, no change since last AWV  Health Risk Assessment:  Patient rates overall health as very good  Patient feels that their physical health rating is Same  Eyesight was rated as Same  Hearing was rated as Same  Patient feels that their emotional and mental health rating is Slightly worse  Pain experienced by patient in the last 7 days has been None  Patient states that he has experienced no weight loss or gain in last 6 months  Emotional/Mental Health:  Patient has been feeling nervous/anxious  PHQ-9 Depression Screening:    Frequency of the following problems over the past two weeks:      1  Little interest or pleasure in doing things: 0 - not at all      2  Feeling down, depressed, or hopeless: 0 - not at all  PHQ-2 Score: 0          Broken Bones/Falls:     Fall Risk Assessment:    In the past year, patient has experienced: No history of falling in past year          Bladder/Bowel:  Patient has not leaked urine accidently in the last six months  Patient reports no loss of bowel control  Immunizations:  Patient has had a flu vaccination within the last year  Patient has received a pneumonia shot  Patient has received a shingles shot  Patient has received tetanus/diphtheria shot  Date of tetanus/diphtheria shot: 3/5/2015    Home Safety:  Patient does not have trouble with stairs inside or outside of their home  Patient currently reports that there are no safety hazards present in home, working smoke alarms, working carbon monoxide detectors  Preventative Screenings:   prostate cancer screen performed, no colon cancer screen completed, cholesterol screen completed,     Nutrition:  Current diet: Regular and Limited junk food with servings of the following:    Medications:  Patient is not currently taking any over-the-counter supplements  Patient is able to manage medications  Lifestyle Choices:  Patient reports no tobacco use  Patient has smoked or used tobacco in the past   Patient has not stopped tobacco use  Patient reports alcohol use  Patient drives a vehicle  Patient wears seat belt  Current level of exercise of physical activity described by patient as: MOSTLY [address]  Activities of Daily Living:  Can get out of bed by his or her self, able to dress self, able to make own meals, able to do own shopping, able to bathe self, can do own laundry/housekeeping, can manage own money, pay bills and track expenses    Previous Hospitalizations:  No hospitalization or ED visit in past 12 months        Advanced Directives:  Patient has decided on a power of   Patient has spoken to designated power of   Patient has completed advanced directive          Preventative Screening/Counseling:      Cardiovascular:      General: Screening Current          Colorectal Cancer:      General: Screening Current          Prostate Cancer:      General: Screening Current Osteoporosis:      General: Screening Current          AAA:      General: Screening Not Indicated          Glaucoma:      General: Screening Current          HIV:      General: Screening Not Indicated          Hepatitis C:      General: Screening Not Indicated        Advanced Directives:   Patient has living will for healthcare, has durable POA for healthcare, patient has an advanced directive  Information on ACP and/or AD provided  5 wishes given  End of life assessment reviewed with patient  Provider agrees with end of life decisions        Immunizations:      Influenza: Influenza UTD This Year      Pneumococcal: Lifetime Vaccine Completed      Shingrix: Shingrix Vaccine UTD      Hepatitis B (Low risk patients): Series Not Indicated      Zostavax: Zostavax Vaccine UTD      TD: Td Vaccine UTD

## 2018-08-16 NOTE — PROGRESS NOTES
Assessment/Plan:       Diagnoses and all orders for this visit:    Paroxysmal atrial fibrillation ([address] )    Pure hypercholesterolemia    Benign essential hypertension        No problem-specific Assessment & Plan notes found for this encounter  We will follow up in 6 months    Subjective:      Patient ID: Sarah Flores is a 82 y o  male  The patient has a history of A-Fib and is not on anticoagulation  I noted that his CHAS2 VASC score was at least 2 and discussed anticoagulation  However, the patient wants to discuss with Dr Tete Freedman  The patient notes that he perspires excessively while working outside  He notes no chest pain or pressure and states no SOB  He states that he has no nausea  We discuss this relative the patient's wife concern regarding CAD  At this time, further evaluation does not seem warranted  The patient has no other issues at this time  He is doing well with his Simvastatin and states no myalgia  He notes no issues with the Ramipril and Metoprolol and states no dizziness  The following portions of the patient's history were reviewed and updated as appropriate:   He has a past medical history of Anemia (12/10/2012); BPH with obstruction/lower urinary tract symptoms; Cataract; Comb artrl insuff & corporo-venous occlusv erectile dysfnct; Frequency of micturition; Hypercholesteremia; and Poor urinary stream ,   does not have any pertinent problems on file  ,   has a past surgical history that includes Hernia repair  ,  family history includes Colon cancer in his family; Coronary artery disease in his mother; No Known Problems in his father  ,   reports that he has never smoked  He has never used smokeless tobacco  He reports that he drinks about 0 6 - 1 2 oz of alcohol per week   He reports that he does not use drugs  ,  has No Known Allergies     Current Outpatient Prescriptions   Medication Sig Dispense Refill    amLODIPine (NORVASC) 2 5 mg tablet daily    0    aspirin (ASPIRIN LOW DOSE) 81 MG tablet Take 1 tablet by mouth daily      Calcium Carbonate-Vitamin D (CALTRATE 600+D) 600-400 MG-UNIT per tablet Take by mouth      coenzyme Q-10 100 MG capsule Take 1 capsule by mouth daily        metoprolol tartrate (LOPRESSOR) 25 mg tablet daily    0    Multiple Vitamins-Minerals (CENTRUM ADULTS PO) Take 1 tablet by mouth daily      Omega-3 1000 MG CAPS Take 1 capsule by mouth 2 (two) times a day        ramipril (ALTACE) 10 MG capsule Take by mouth daily        simvastatin (ZOCOR) 40 mg tablet Take 1 tablet (40 mg total) by mouth daily 90 tablet 3    tadalafil (CIALIS) 20 MG tablet Take 1 tablet (20 mg total) by mouth daily as needed for erectile dysfunction 16 tablet 0     No current facility-administered medications for this visit  Review of Systems   Constitutional: Negative for chills, fatigue and fever  HENT: Negative for ear pain, postnasal drip, rhinorrhea, sinus pain and sinus pressure  Respiratory: Negative for cough, chest tightness and shortness of breath  Cardiovascular: Negative for chest pain, palpitations and leg swelling  Gastrointestinal: Negative for abdominal pain, constipation, diarrhea, nausea and vomiting  Genitourinary: Negative  Musculoskeletal: Negative  Neurological: Negative  Psychiatric/Behavioral: Negative  Objective:  Vitals:    08/16/18 0910   BP: 150/84   BP Location: Left arm   Patient Position: Sitting   Cuff Size: Large   Pulse: 56   Resp: 16   Temp: 97 8 °F (36 6 °C)   SpO2: 96%   Weight: 87 5 kg (193 lb)   Height: 5' 10" (1 778 m)     Body mass index is 27 69 kg/m²  Physical Exam   Constitutional: He is oriented to person, place, and time  He appears well-developed and well-nourished  HENT:   Head: Normocephalic and atraumatic  Neck: Normal range of motion  Neck supple  Pulmonary/Chest: Effort normal    Abdominal: Soft  Bowel sounds are normal    Musculoskeletal: Normal range of motion     Neurological: He is alert and oriented to person, place, and time  Skin: Skin is warm  Psychiatric: He has a normal mood and affect  Vitals reviewed

## 2019-02-13 RX ORDER — PREDNISOLONE ACETATE 10 MG/ML
SUSPENSION/ DROPS OPHTHALMIC
Refills: 0 | COMMUNITY
Start: 2019-01-29 | End: 2019-07-19 | Stop reason: ALTCHOICE

## 2019-02-13 RX ORDER — AMLODIPINE BESYLATE 5 MG/1
TABLET ORAL
Refills: 0 | COMMUNITY
Start: 2019-02-01 | End: 2019-02-14

## 2019-02-13 RX ORDER — OFLOXACIN 3 MG/ML
SOLUTION/ DROPS OPHTHALMIC
Refills: 0 | COMMUNITY
Start: 2019-01-29 | End: 2019-03-29 | Stop reason: ALTCHOICE

## 2019-02-14 ENCOUNTER — CONSULT (OUTPATIENT)
Dept: INTERNAL MEDICINE CLINIC | Facility: CLINIC | Age: 84
End: 2019-02-14
Payer: MEDICARE

## 2019-02-14 VITALS
BODY MASS INDEX: 28.12 KG/M2 | HEART RATE: 66 BPM | WEIGHT: 196.4 LBS | OXYGEN SATURATION: 98 % | DIASTOLIC BLOOD PRESSURE: 74 MMHG | HEIGHT: 70 IN | SYSTOLIC BLOOD PRESSURE: 140 MMHG | TEMPERATURE: 98.7 F | RESPIRATION RATE: 16 BRPM

## 2019-02-14 DIAGNOSIS — Z01.818 PRE-OPERATIVE CLEARANCE: Primary | ICD-10-CM

## 2019-02-14 PROCEDURE — 99213 OFFICE O/P EST LOW 20 MIN: CPT | Performed by: PHYSICIAN ASSISTANT

## 2019-02-14 NOTE — PROGRESS NOTES
Subjective: Chester Sharp is a 82 y o  male who presents to the office today for a preoperative consultation at the request of surgeon Dr Gray Temple who plans on performing Bilateral cataract removal on Feb 18 and March 11   Niki Beyer Planned anesthesia: local  The patient has the following known anesthesia issues: N/A  Patients bleeding risk: no recent abnormal bleeding  Patient does not have objections to receiving blood products if needed  The following portions of the patient's history were reviewed and updated as appropriate:   He  has a past medical history of Anemia (12/10/2012), BPH with obstruction/lower urinary tract symptoms, Cataract, Comb artrl insuff & corporo-venous occlusv erectile dysfnct, Frequency of micturition, Hypercholesteremia, Hypertension, and Poor urinary stream   He   Patient Active Problem List    Diagnosis Date Noted    Acquired trigger finger 06/12/2018    Low back pain 06/12/2018    BPH with obstruction/lower urinary tract symptoms 06/12/2018    D-dimer, elevated 01/07/2018    Combined arterial insufficiency and corporo-venous occlusive erectile dysfunction 06/25/2013    Benign colon polyp 12/10/2012    Benign essential hypertension 06/21/2012    Pure hypercholesterolemia 06/21/2012    Paroxysmal atrial fibrillation ([facility] [address] ) 06/21/2012    Diverticulosis 06/21/2012     He  has a past surgical history that includes Hernia repair  His family history includes Colon cancer in his family; Coronary artery disease in his mother; No Known Problems in his father  He  reports that he has never smoked  He has never used smokeless tobacco  He reports that he drinks about 0 6 - 1 2 oz of alcohol per week  He reports that he does not use drugs    Current Outpatient Medications   Medication Sig Dispense Refill    amLODIPine (NORVASC) 2 5 mg tablet 2 (two) times a day   0    aspirin (ASPIRIN LOW DOSE) 81 MG tablet Take 1 tablet by mouth daily      Calcium Carbonate-Vitamin D (CALTRATE 600+D) 600-400 MG-UNIT per tablet Take by mouth      coenzyme Q-10 100 MG capsule Take 1 capsule by mouth daily        metoprolol tartrate (LOPRESSOR) 25 mg tablet daily    0    Multiple Vitamins-Minerals (CENTRUM ADULTS PO) Take 1 tablet by mouth daily      Omega-3 1000 MG CAPS Take 1 capsule by mouth 2 (two) times a day        ramipril (ALTACE) 10 MG capsule Take by mouth daily        simvastatin (ZOCOR) 40 mg tablet Take 1 tablet (40 mg total) by mouth daily 90 tablet 3    tadalafil (CIALIS) 20 MG tablet Take 1 tablet (20 mg total) by mouth daily as needed for erectile dysfunction 16 tablet 0    ofloxacin (OCUFLOX) 0 3 % ophthalmic solution instill 1 drop into left eye four times a day STARTING 3 DAYS NORTH     (REFER TO PRESCRIPTION NOTES)  0    prednisoLONE acetate (PRED FORTE) 1 % ophthalmic suspension instill 1 drop into left eye six times a day STARTING AFTER SURGERY IS COMPLETED  0     No current facility-administered medications for this visit  Current Outpatient Medications on File Prior to Visit   Medication Sig    amLODIPine (NORVASC) 2 5 mg tablet 2 (two) times a day     aspirin (ASPIRIN LOW DOSE) 81 MG tablet Take 1 tablet by mouth daily    Calcium Carbonate-Vitamin D (CALTRATE 600+D) 600-400 MG-UNIT per tablet Take by mouth    coenzyme Q-10 100 MG capsule Take 1 capsule by mouth daily      metoprolol tartrate (LOPRESSOR) 25 mg tablet daily      Multiple Vitamins-Minerals (CENTRUM ADULTS PO) Take 1 tablet by mouth daily    Omega-3 1000 MG CAPS Take 1 capsule by mouth 2 (two) times a day      ramipril (ALTACE) 10 MG capsule Take by mouth daily      simvastatin (ZOCOR) 40 mg tablet Take 1 tablet (40 mg total) by mouth daily    tadalafil (CIALIS) 20 MG tablet Take 1 tablet (20 mg total) by mouth daily as needed for erectile dysfunction    ofloxacin (OCUFLOX) 0 3 % ophthalmic solution instill 1 drop into left eye four times a day STARTING 3 DAYS NORTH     (REFER TO PRESCRIPTION NOTES)      prednisoLONE acetate (PRED FORTE) 1 % ophthalmic suspension instill 1 drop into left eye six times a day STARTING AFTER SURGERY IS COMPLETED    [DISCONTINUED] amLODIPine (NORVASC) 5 mg tablet      No current facility-administered medications on file prior to visit  He has No Known Allergies       Review of Systems  Constitutional: negative  Eyes: positive for cataracts  Ears, nose, mouth, throat, and face: negative  Respiratory: negative  Cardiovascular: negative     Objective:     /74 (BP Location: Left arm, Patient Position: Sitting, Cuff Size: Large)   Pulse 66   Temp 98 7 °F (37 1 °C)   Resp 16   Ht 5' 10" (1 778 m)   Wt 89 1 kg (196 lb 6 4 oz)   SpO2 98%   BMI 28 18 kg/m²     General Appearance:    Alert, cooperative, no distress, appears stated age   Head:    Normocephalic, without obvious abnormality, atraumatic   Eyes:    PERRL, conjunctiva/corneas clear, EOM's intact, fundi     benign, both eyes        Ears:    Normal TM's and external ear canals, both ears   Nose:   Nares normal, septum midline, mucosa normal, no drainage    or sinus tenderness   Throat:   Lips, mucosa, and tongue normal; teeth and gums normal   Neck:   Supple, symmetrical, trachea midline, no adenopathy;        thyroid:  No enlargement/tenderness/nodules; no carotid    bruit or JVD   Back:     Symmetric, no curvature, ROM normal, no CVA tenderness   Lungs:     Clear to auscultation bilaterally, respirations unlabored   Chest wall:    No tenderness or deformity   Heart:    Regular rate and rhythm, S1 and S2 normal, no murmur, rub   or gallop   Abdomen:     Soft, non-tender, bowel sounds active all four quadrants,     no masses, no organomegaly   Genitalia:    Normal male without lesion, discharge or tenderness   Rectal:    Normal tone, normal prostate, no masses or tenderness;    guaiac negative stool   Extremities:   Extremities normal, atraumatic, no cyanosis or edema   Pulses:   2+ and symmetric all extremities   Skin: Skin color, texture, turgor normal, no rashes or lesions   Lymph nodes:   Cervical, supraclavicular, and axillary nodes normal   Neurologic:   CNII-XII intact  Normal strength, sensation and reflexes       throughout         Cardiographics  ECG: no prior ECG      Imaging  Chest x-ray: N/A     Lab Review   not applicable     Assessment:     82 y o  male with planned surgery as above  Known risk factors for perioperative complications: None    Difficulty with intubation N/A anticipated  Cardiac Risk Estimation: Low    Current medications which may produce withdrawal symptoms if withheld perioperatively:      Plan:     1  Preoperative workup as follows none  2  Change in medication regimen before surgery: none, continue medication regimen including morning of surgery, with sip of water    3  Roberts risk Index Score:

## 2019-02-27 DIAGNOSIS — E78.5 HYPERLIPIDEMIA, UNSPECIFIED HYPERLIPIDEMIA TYPE: ICD-10-CM

## 2019-02-27 RX ORDER — SIMVASTATIN 40 MG
TABLET ORAL
Qty: 90 TABLET | Refills: 3 | Status: SHIPPED | OUTPATIENT
Start: 2019-02-27 | End: 2020-02-26 | Stop reason: SDUPTHER

## 2019-03-29 ENCOUNTER — OFFICE VISIT (OUTPATIENT)
Dept: INTERNAL MEDICINE CLINIC | Facility: CLINIC | Age: 84
End: 2019-03-29
Payer: MEDICARE

## 2019-03-29 VITALS
TEMPERATURE: 97.7 F | OXYGEN SATURATION: 98 % | SYSTOLIC BLOOD PRESSURE: 108 MMHG | RESPIRATION RATE: 16 BRPM | HEART RATE: 91 BPM | HEIGHT: 70 IN | WEIGHT: 193.8 LBS | BODY MASS INDEX: 27.75 KG/M2 | DIASTOLIC BLOOD PRESSURE: 70 MMHG

## 2019-03-29 DIAGNOSIS — I10 BENIGN ESSENTIAL HYPERTENSION: ICD-10-CM

## 2019-03-29 DIAGNOSIS — E78.5 HYPERLIPIDEMIA, UNSPECIFIED HYPERLIPIDEMIA TYPE: ICD-10-CM

## 2019-03-29 DIAGNOSIS — I48.0 PAROXYSMAL ATRIAL FIBRILLATION (HCC): Primary | ICD-10-CM

## 2019-03-29 DIAGNOSIS — N52.9 ERECTILE DYSFUNCTION, UNSPECIFIED ERECTILE DYSFUNCTION TYPE: ICD-10-CM

## 2019-03-29 DIAGNOSIS — Z12.5 SCREENING FOR PROSTATE CANCER: ICD-10-CM

## 2019-03-29 DIAGNOSIS — Z13.29 SCREENING FOR HYPOTHYROIDISM: ICD-10-CM

## 2019-03-29 DIAGNOSIS — E78.00 PURE HYPERCHOLESTEROLEMIA: ICD-10-CM

## 2019-03-29 PROBLEM — R61 EXCESSIVE SWEATING: Status: ACTIVE | Noted: 2019-03-29

## 2019-03-29 PROCEDURE — 99214 OFFICE O/P EST MOD 30 MIN: CPT | Performed by: INTERNAL MEDICINE

## 2019-03-29 NOTE — PROGRESS NOTES
Assessment/Plan:       Diagnoses and all orders for this visit:    Paroxysmal atrial fibrillation (HCC)  -     Comprehensive metabolic panel  -     CBC and differential  -     metoprolol tartrate (LOPRESSOR) 25 mg tablet; Take 1 tablet (25 mg total) by mouth every 12 (twelve) hours for 180 days    Benign essential hypertension  -     Comprehensive metabolic panel  -     CBC and differential    Erectile dysfunction, unspecified erectile dysfunction type  -     Comprehensive metabolic panel  -     CBC and differential    Hyperlipidemia, unspecified hyperlipidemia type  -     Lipid Panel with Direct LDL reflex    Pure hypercholesterolemia  -     Lipid Panel with Direct LDL reflex    Screening for prostate cancer  -     PSA, Total Screen    Screening for hypothyroidism  -     TSH, 3rd generation with Free T4 reflex; Future        No problem-specific Assessment & Plan notes found for this encounter  We will follow up in 6 moths    Subjective:      Patient ID: Zoey Blanco is a 82 y o  male  HR elevated and we rechecked this and noted that the patient was in atrial fibrillation, but his HR was noted to be in the 60-70  The patient states that he has a chronic cough  He states there are no other issues at this time  The patient notes no other issues at this time  His A-fib appears to be doing well on the the Lopressor and his rate is controlled  He is doing good on the Simvastatin with the small dose of Amlodipine  Hypertension   This is a chronic problem  The current episode started more than 1 year ago  The problem is unchanged  The problem is controlled  Pertinent negatives include no chest pain, neck pain, palpitations or shortness of breath  There are no associated agents to hypertension  Risk factors for coronary artery disease include male gender and dyslipidemia  Past treatments include beta blockers, calcium channel blockers and ACE inhibitors   The current treatment provides significant improvement  There are no compliance problems  The following portions of the patient's history were reviewed and updated as appropriate:   He has a past medical history of Anemia (12/10/2012), BPH with obstruction/lower urinary tract symptoms, Cataract, Comb artrl insuff & corporo-venous occlusv erectile dysfnct, Frequency of micturition, Hypercholesteremia, Hypertension, and Poor urinary stream ,  does not have any pertinent problems on file  ,   has a past surgical history that includes Hernia repair and Cataract extraction  ,  family history includes Colon cancer in his family; Coronary artery disease in his mother; No Known Problems in his father  ,   reports that he has never smoked  He has never used smokeless tobacco  He reports that he drinks about 0 6 - 1 2 oz of alcohol per week  He reports that he does not use drugs  ,  has No Known Allergies     Current Outpatient Medications   Medication Sig Dispense Refill    amLODIPine (NORVASC) 2 5 mg tablet 2 (two) times a day   0    aspirin (ASPIRIN LOW DOSE) 81 MG tablet Take 1 tablet by mouth daily      Calcium Carbonate-Vitamin D (CALTRATE 600+D) 600-400 MG-UNIT per tablet Take by mouth      coenzyme Q-10 100 MG capsule Take 1 capsule by mouth daily        metoprolol tartrate (LOPRESSOR) 25 mg tablet Take 1 tablet (25 mg total) by mouth every 12 (twelve) hours for 180 days 180 tablet 1    Multiple Vitamins-Minerals (CENTRUM ADULTS PO) Take 1 tablet by mouth daily      Omega-3 1000 MG CAPS Take 1 capsule by mouth 2 (two) times a day        prednisoLONE acetate (PRED FORTE) 1 % ophthalmic suspension instill 1 drop into left eye six times a day STARTING AFTER SURGERY IS COMPLETED  0    ramipril (ALTACE) 10 MG capsule Take by mouth daily        simvastatin (ZOCOR) 40 mg tablet take 1 tablet by mouth once daily 90 tablet 3     No current facility-administered medications for this visit          Review of Systems   Constitutional: Negative for chills, fatigue and fever  HENT: Negative for ear pain, rhinorrhea, sinus pressure, sinus pain and sore throat  Respiratory: Negative for cough, chest tightness and shortness of breath  Cardiovascular: Negative for chest pain, palpitations and leg swelling  Gastrointestinal: Negative for abdominal pain, constipation, diarrhea, nausea and vomiting  Genitourinary: Negative  Musculoskeletal: Negative for arthralgias, joint swelling, myalgias and neck pain  Neurological: Negative  Psychiatric/Behavioral: Negative  Objective:  Vitals:    03/29/19 1105   BP: 108/70   BP Location: Left arm   Patient Position: Sitting   Cuff Size: Adult   Pulse: 91   Resp: 16   Temp: 97 7 °F (36 5 °C)   TempSrc: Tympanic   SpO2: 98%   Weight: 87 9 kg (193 lb 12 8 oz)   Height: 5' 10" (1 778 m)     Body mass index is 27 81 kg/m²  Physical Exam   Constitutional: He is oriented to person, place, and time  He appears well-developed and well-nourished  HENT:   Head: Normocephalic and atraumatic  Eyes: Pupils are equal, round, and reactive to light  EOM are normal    Neck: Normal range of motion  Neck supple  Cardiovascular: Normal rate and normal heart sounds  An irregular rhythm present  Exam reveals no friction rub  No murmur heard  Pulmonary/Chest: Effort normal  No stridor  No respiratory distress  He has no wheezes  Abdominal: Soft  Bowel sounds are normal  He exhibits no distension  There is no tenderness  There is no guarding  Musculoskeletal: Normal range of motion  He exhibits no edema or tenderness  Neurological: He is alert and oriented to person, place, and time  He displays normal reflexes  No cranial nerve deficit  Coordination normal    Skin: Skin is warm and dry  Psychiatric: He has a normal mood and affect  Nursing note and vitals reviewed

## 2019-04-03 ENCOUNTER — APPOINTMENT (OUTPATIENT)
Dept: LAB | Facility: CLINIC | Age: 84
End: 2019-04-03
Payer: MEDICARE

## 2019-04-03 DIAGNOSIS — Z13.29 SCREENING FOR HYPOTHYROIDISM: ICD-10-CM

## 2019-04-03 LAB
ALBUMIN SERPL BCP-MCNC: 4 G/DL (ref 3.5–5)
ALP SERPL-CCNC: 43 U/L (ref 46–116)
ALT SERPL W P-5'-P-CCNC: 23 U/L (ref 12–78)
ANION GAP SERPL CALCULATED.3IONS-SCNC: 3 MMOL/L (ref 4–13)
AST SERPL W P-5'-P-CCNC: 18 U/L (ref 5–45)
BASOPHILS # BLD AUTO: 0.04 THOUSANDS/ΜL (ref 0–0.1)
BASOPHILS NFR BLD AUTO: 1 % (ref 0–1)
BILIRUB SERPL-MCNC: 0.76 MG/DL (ref 0.2–1)
BUN SERPL-MCNC: 11 MG/DL (ref 5–25)
CALCIUM SERPL-MCNC: 9.2 MG/DL (ref 8.3–10.1)
CHLORIDE SERPL-SCNC: 107 MMOL/L (ref 100–108)
CHOLEST SERPL-MCNC: 129 MG/DL (ref 50–200)
CO2 SERPL-SCNC: 29 MMOL/L (ref 21–32)
CREAT SERPL-MCNC: 0.85 MG/DL (ref 0.6–1.3)
EOSINOPHIL # BLD AUTO: 0.18 THOUSAND/ΜL (ref 0–0.61)
EOSINOPHIL NFR BLD AUTO: 4 % (ref 0–6)
ERYTHROCYTE [DISTWIDTH] IN BLOOD BY AUTOMATED COUNT: 12.9 % (ref 11.6–15.1)
GFR SERPL CREATININE-BSD FRML MDRD: 82 ML/MIN/1.73SQ M
GLUCOSE P FAST SERPL-MCNC: 99 MG/DL (ref 65–99)
HCT VFR BLD AUTO: 41.2 % (ref 36.5–49.3)
HDLC SERPL-MCNC: 38 MG/DL (ref 40–60)
HGB BLD-MCNC: 13.9 G/DL (ref 12–17)
IMM GRANULOCYTES # BLD AUTO: 0.01 THOUSAND/UL (ref 0–0.2)
IMM GRANULOCYTES NFR BLD AUTO: 0 % (ref 0–2)
LDLC SERPL CALC-MCNC: 77 MG/DL (ref 0–100)
LYMPHOCYTES # BLD AUTO: 1.3 THOUSANDS/ΜL (ref 0.6–4.47)
LYMPHOCYTES NFR BLD AUTO: 29 % (ref 14–44)
MCH RBC QN AUTO: 32.3 PG (ref 26.8–34.3)
MCHC RBC AUTO-ENTMCNC: 33.7 G/DL (ref 31.4–37.4)
MCV RBC AUTO: 96 FL (ref 82–98)
MONOCYTES # BLD AUTO: 0.34 THOUSAND/ΜL (ref 0.17–1.22)
MONOCYTES NFR BLD AUTO: 8 % (ref 4–12)
NEUTROPHILS # BLD AUTO: 2.64 THOUSANDS/ΜL (ref 1.85–7.62)
NEUTS SEG NFR BLD AUTO: 58 % (ref 43–75)
NRBC BLD AUTO-RTO: 0 /100 WBCS
PLATELET # BLD AUTO: 200 THOUSANDS/UL (ref 149–390)
PMV BLD AUTO: 8.7 FL (ref 8.9–12.7)
POTASSIUM SERPL-SCNC: 4.5 MMOL/L (ref 3.5–5.3)
PROT SERPL-MCNC: 7.3 G/DL (ref 6.4–8.2)
PSA SERPL-MCNC: 4.8 NG/ML (ref 0–4)
RBC # BLD AUTO: 4.3 MILLION/UL (ref 3.88–5.62)
SODIUM SERPL-SCNC: 139 MMOL/L (ref 136–145)
TRIGL SERPL-MCNC: 72 MG/DL
TSH SERPL DL<=0.05 MIU/L-ACNC: 2.89 UIU/ML (ref 0.36–3.74)
WBC # BLD AUTO: 4.51 THOUSAND/UL (ref 4.31–10.16)

## 2019-04-03 PROCEDURE — 84443 ASSAY THYROID STIM HORMONE: CPT

## 2019-04-03 PROCEDURE — 80053 COMPREHEN METABOLIC PANEL: CPT | Performed by: INTERNAL MEDICINE

## 2019-04-03 PROCEDURE — 80061 LIPID PANEL: CPT | Performed by: INTERNAL MEDICINE

## 2019-04-03 PROCEDURE — 36415 COLL VENOUS BLD VENIPUNCTURE: CPT | Performed by: INTERNAL MEDICINE

## 2019-04-03 PROCEDURE — 85025 COMPLETE CBC W/AUTO DIFF WBC: CPT | Performed by: INTERNAL MEDICINE

## 2019-04-03 PROCEDURE — G0103 PSA SCREENING: HCPCS | Performed by: INTERNAL MEDICINE

## 2019-04-08 DIAGNOSIS — R97.20 ELEVATED PSA: Primary | ICD-10-CM

## 2019-04-17 DIAGNOSIS — N52.9 ERECTILE DYSFUNCTION, UNSPECIFIED ERECTILE DYSFUNCTION TYPE: Primary | ICD-10-CM

## 2019-04-17 RX ORDER — TADALAFIL 20 MG/1
20 TABLET ORAL DAILY PRN
COMMUNITY
End: 2019-04-17 | Stop reason: SDUPTHER

## 2019-04-18 RX ORDER — TADALAFIL 20 MG/1
20 TABLET ORAL DAILY PRN
Qty: 24 TABLET | Refills: 5 | Status: SHIPPED | OUTPATIENT
Start: 2019-04-18 | End: 2019-05-01 | Stop reason: SDUPTHER

## 2019-05-01 DIAGNOSIS — N52.9 ERECTILE DYSFUNCTION, UNSPECIFIED ERECTILE DYSFUNCTION TYPE: ICD-10-CM

## 2019-05-01 RX ORDER — TADALAFIL 20 MG/1
20 TABLET ORAL DAILY PRN
Qty: 24 TABLET | Refills: 5 | Status: SHIPPED | OUTPATIENT
Start: 2019-05-01 | End: 2021-08-26

## 2019-05-10 ENCOUNTER — APPOINTMENT (OUTPATIENT)
Dept: LAB | Facility: CLINIC | Age: 84
End: 2019-05-10
Payer: MEDICARE

## 2019-05-10 ENCOUNTER — TRANSCRIBE ORDERS (OUTPATIENT)
Dept: ADMINISTRATIVE | Facility: HOSPITAL | Age: 84
End: 2019-05-10

## 2019-05-10 DIAGNOSIS — R97.20 ELEVATED PROSTATE SPECIFIC ANTIGEN (PSA): Primary | ICD-10-CM

## 2019-05-10 DIAGNOSIS — R97.20 ELEVATED PROSTATE SPECIFIC ANTIGEN (PSA): ICD-10-CM

## 2019-05-10 LAB — PSA SERPL-MCNC: 3.4 NG/ML (ref 0–4)

## 2019-05-10 PROCEDURE — G0103 PSA SCREENING: HCPCS

## 2019-05-10 PROCEDURE — 36415 COLL VENOUS BLD VENIPUNCTURE: CPT

## 2019-07-19 ENCOUNTER — APPOINTMENT (OUTPATIENT)
Dept: LAB | Facility: CLINIC | Age: 84
End: 2019-07-19
Payer: MEDICARE

## 2019-07-19 ENCOUNTER — OFFICE VISIT (OUTPATIENT)
Dept: INTERNAL MEDICINE CLINIC | Facility: CLINIC | Age: 84
End: 2019-07-19
Payer: MEDICARE

## 2019-07-19 VITALS
OXYGEN SATURATION: 98 % | HEIGHT: 70 IN | TEMPERATURE: 97.3 F | RESPIRATION RATE: 18 BRPM | HEART RATE: 47 BPM | BODY MASS INDEX: 27.26 KG/M2 | WEIGHT: 190.4 LBS | DIASTOLIC BLOOD PRESSURE: 72 MMHG | SYSTOLIC BLOOD PRESSURE: 128 MMHG

## 2019-07-19 DIAGNOSIS — E78.00 PURE HYPERCHOLESTEROLEMIA: ICD-10-CM

## 2019-07-19 DIAGNOSIS — I10 BENIGN ESSENTIAL HYPERTENSION: ICD-10-CM

## 2019-07-19 DIAGNOSIS — I48.0 PAROXYSMAL ATRIAL FIBRILLATION (HCC): ICD-10-CM

## 2019-07-19 DIAGNOSIS — R41.3 MEMORY CHANGE: Primary | ICD-10-CM

## 2019-07-19 DIAGNOSIS — R41.3 MEMORY CHANGE: ICD-10-CM

## 2019-07-19 LAB
FOLATE SERPL-MCNC: >20 NG/ML (ref 3.1–17.5)
TSH SERPL DL<=0.05 MIU/L-ACNC: 2.19 UIU/ML (ref 0.36–3.74)
VIT B12 SERPL-MCNC: 463 PG/ML (ref 100–900)

## 2019-07-19 PROCEDURE — 99214 OFFICE O/P EST MOD 30 MIN: CPT | Performed by: INTERNAL MEDICINE

## 2019-07-19 PROCEDURE — 82746 ASSAY OF FOLIC ACID SERUM: CPT

## 2019-07-19 PROCEDURE — 36415 COLL VENOUS BLD VENIPUNCTURE: CPT

## 2019-07-19 PROCEDURE — 84443 ASSAY THYROID STIM HORMONE: CPT

## 2019-07-19 PROCEDURE — 82607 VITAMIN B-12: CPT

## 2019-07-19 NOTE — PROGRESS NOTES
Assessment/Plan:       Diagnoses and all orders for this visit:    Memory change  -     CT head wo contrast; Future  -     TSH, 3rd generation with Free T4 reflex; Future  -     Vitamin B12; Future  -     Folate; Future    Paroxysmal atrial fibrillation (HCC)    Benign essential hypertension    Pure hypercholesterolemia    Other orders  -     Tetrahydrozoline-Zn Sulfate (EYE DROPS A/C OP); Apply to eye        No problem-specific Assessment & Plan notes found for this encounter  The patient will follow up in the next several weeks and we will see how things go with the CT of head  Subjective:      Patient ID: Rere Guzman is a 82 y o  male  There are 3 issues at this time  The patient is noted by his wife to have issues with forgetfulness  This has been happening over several months  The patient states that the he feels fine and notes no concerns  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 and notes no concerns regarding his memory  He has had no numbness or weakness  The onset per his wife is gradual   The patient notes no symptoms of depression and notes no other concerns  The patient does have a history of A-Fib has no history of falls  He has 2 risk factors per his CHADS2 VASC score and should be considered for anticoagulation  The patient is only on ASA  They noted that would follow up with Dr Hiram Bergeron and ask him about  We discussed the risk of cardio embolic stroke  His BP is good and there are no concerns regarding his medications  He is doing well on the Simvastatin  The following portions of the patient's history were reviewed and updated as appropriate:   He has a past medical history of Anemia (12/10/2012), BPH with obstruction/lower urinary tract symptoms, Cataract, Comb artrl insuff & corporo-venous occlusv erectile dysfnct, Frequency of micturition, Hypercholesteremia, Hypertension, Memory loss, and Poor urinary stream ,  does not have any pertinent problems on file  , has a past surgical history that includes Hernia repair and Cataract extraction  ,  family history includes Colon cancer in his family; Coronary artery disease in his mother; No Known Problems in his father  ,   reports that he has never smoked  He has never used smokeless tobacco  He reports that he drinks about 1 0 - 2 0 standard drinks of alcohol per week  He reports that he does not use drugs  ,  has No Known Allergies     Current Outpatient Medications   Medication Sig Dispense Refill    amLODIPine (NORVASC) 2 5 mg tablet 2 (two) times a day   0    aspirin (ASPIRIN LOW DOSE) 81 MG tablet Take 1 tablet by mouth daily      Calcium Carbonate-Vitamin D (CALTRATE 600+D) 600-400 MG-UNIT per tablet Take by mouth      coenzyme Q-10 100 MG capsule Take 1 capsule by mouth daily        metoprolol tartrate (LOPRESSOR) 25 mg tablet Take 1 tablet (25 mg total) by mouth every 12 (twelve) hours for 180 days 180 tablet 1    Multiple Vitamins-Minerals (CENTRUM ADULTS PO) Take 1 tablet by mouth daily      Omega-3 1000 MG CAPS Take 1 capsule by mouth 2 (two) times a day        ramipril (ALTACE) 10 MG capsule Take by mouth daily        simvastatin (ZOCOR) 40 mg tablet take 1 tablet by mouth once daily 90 tablet 3    tadalafil (CIALIS) 20 MG tablet Take 1 tablet (20 mg total) by mouth daily as needed for erectile dysfunction 24 tablet 5    Tetrahydrozoline-Zn Sulfate (EYE DROPS A/C OP) Apply to eye       No current facility-administered medications for this visit  Review of Systems   Constitutional: Negative for chills, fatigue and fever  HENT: Negative for ear pain, sinus pressure, sore throat and tinnitus  Respiratory: Negative for cough, chest tightness and shortness of breath  Cardiovascular: Negative for chest pain, palpitations and leg swelling  Gastrointestinal: Negative  Negative for abdominal pain, constipation, diarrhea, nausea and vomiting  Genitourinary: Negative  Musculoskeletal: Negative  Neurological: Negative for dizziness, weakness, light-headedness, numbness and headaches  Psychiatric/Behavioral: Negative  Objective:  Vitals:    07/19/19 1029   BP: 128/72   BP Location: Left arm   Patient Position: Sitting   Cuff Size: Adult   Pulse: (!) 47   Resp: 18   Temp: (!) 97 3 °F (36 3 °C)   TempSrc: Tympanic   SpO2: 98%   Weight: 86 4 kg (190 lb 6 4 oz)   Height: 5' 10" (1 778 m)     Body mass index is 27 32 kg/m²  Physical Exam   Constitutional: He is oriented to person, place, and time  He appears well-developed and well-nourished  HENT:   Head: Normocephalic and atraumatic  Eyes: Pupils are equal, round, and reactive to light  EOM are normal    Neck: Normal range of motion  Neck supple  Cardiovascular: Normal rate, regular rhythm, normal heart sounds and intact distal pulses  Exam reveals no gallop and no friction rub  No murmur heard  Pulmonary/Chest: Effort normal and breath sounds normal  No respiratory distress  He has no wheezes  He has no rales  Abdominal: Soft  Bowel sounds are normal  He exhibits no mass  There is no tenderness  There is no rebound  Musculoskeletal: Normal range of motion  He exhibits no edema or tenderness  Neurological: He is alert and oriented to person, place, and time  No cranial nerve deficit  Coordination normal    Issues with serial 7s  Okay with Day/Date,  remembers 3 objects, can spell world backward  Skin: Skin is warm and dry  Psychiatric: He has a normal mood and affect  Nursing note and vitals reviewed

## 2019-07-25 ENCOUNTER — HOSPITAL ENCOUNTER (OUTPATIENT)
Dept: CT IMAGING | Facility: HOSPITAL | Age: 84
Discharge: HOME | End: 2019-07-25
Payer: MEDICARE

## 2019-07-25 DIAGNOSIS — R41.3 MEMORY CHANGE: ICD-10-CM

## 2019-07-25 PROCEDURE — 70450 CT HEAD/BRAIN W/O DYE: CPT

## 2019-08-07 ENCOUNTER — OFFICE VISIT (OUTPATIENT)
Dept: UROLOGY | Facility: MEDICAL CENTER | Age: 84
End: 2019-08-07
Payer: MEDICARE

## 2019-08-07 VITALS
HEART RATE: 50 BPM | WEIGHT: 192.6 LBS | DIASTOLIC BLOOD PRESSURE: 68 MMHG | BODY MASS INDEX: 26.96 KG/M2 | SYSTOLIC BLOOD PRESSURE: 112 MMHG | HEIGHT: 71 IN

## 2019-08-07 DIAGNOSIS — N52.03 COMBINED ARTERIAL INSUFFICIENCY AND CORPORO-VENOUS OCCLUSIVE ERECTILE DYSFUNCTION: ICD-10-CM

## 2019-08-07 DIAGNOSIS — N40.1 BPH WITH OBSTRUCTION/LOWER URINARY TRACT SYMPTOMS: Primary | ICD-10-CM

## 2019-08-07 DIAGNOSIS — N13.8 BPH WITH OBSTRUCTION/LOWER URINARY TRACT SYMPTOMS: Primary | ICD-10-CM

## 2019-08-07 DIAGNOSIS — R35.0 FREQUENCY OF MICTURITION: ICD-10-CM

## 2019-08-07 LAB
SL AMB  POCT GLUCOSE, UA: NEGATIVE
SL AMB LEUKOCYTE ESTERASE,UA: NEGATIVE
SL AMB POCT BILIRUBIN,UA: NEGATIVE
SL AMB POCT BLOOD,UA: NEGATIVE
SL AMB POCT CLARITY,UA: CLEAR
SL AMB POCT COLOR,UA: YELLOW
SL AMB POCT KETONES,UA: NEGATIVE
SL AMB POCT NITRITE,UA: NEGATIVE
SL AMB POCT PH,UA: 7.5
SL AMB POCT SPECIFIC GRAVITY,UA: 1.01
SL AMB POCT URINE PROTEIN: NEGATIVE
SL AMB POCT UROBILINOGEN: 0.2

## 2019-08-07 PROCEDURE — 99214 OFFICE O/P EST MOD 30 MIN: CPT | Performed by: UROLOGY

## 2019-08-07 PROCEDURE — 81003 URINALYSIS AUTO W/O SCOPE: CPT | Performed by: UROLOGY

## 2019-08-07 NOTE — ASSESSMENT & PLAN NOTE
The patient is mixed about his voiding pattern  His primary complaint is frequency  He does drink a lot of fluids and drinks 5 cups of coffee per day  I recommended that is a 1st step he decrease his fluid intake  If this does not improve his voiding pattern he can consider either medical therapy or minimally invasive procedure such as urolift  He will try to decrease his fluid intake and he will call should he wish to pursue other therapy

## 2019-08-07 NOTE — ASSESSMENT & PLAN NOTE
AUA symptom score is 17  He is mixed about his quality of life  Urinalysis is negative  PSA was satisfactory at 3 4 on May 10, 2019  His PSA was 4 8 approximately 6 weeks prior but repeat value in May was acceptable  We will plan to recheck a PSA in 1 year

## 2019-08-07 NOTE — PATIENT INSTRUCTIONS
Benign Prostatic Hypertrophy   WHAT YOU NEED TO KNOW:   Benign prostatic hypertrophy (BPH) is a condition that causes your prostate gland to grow larger than normal  The prostate gland is the male sex gland that produces a fluid that is part of semen  It is about the size of a walnut and it is located under the bladder  As the prostate grows, it can squeeze the urethra  This can block urine flow and cause urinary problems  DISCHARGE INSTRUCTIONS:   Medicines:   · Alpha blockers: This medicine relaxes the muscles in your prostate and bladder  It may help you urinate more easily  · 5 alpha reductase inhibitors: These medicines block the production of a hormone that causes the prostate to get larger  It may help slow the growth of the prostate or shrink the prostate  · Take your medicine as directed  Contact your healthcare provider if you think your medicine is not helping or if you have side effects  Tell him or her if you are allergic to any medicine  Keep a list of the medicines, vitamins, and herbs you take  Include the amounts, and when and why you take them  Bring the list or the pill bottles to follow-up visits  Carry your medicine list with you in case of an emergency  Follow up with your healthcare provider as directed:  Write down your questions so you remember to ask them during your visits  Manage BPH:   · Do not let your bladder get too full before you empty it  Urinate when you feel the urge  · Limit alcohol  Do not drink large amounts of any liquid at one time  · Decrease the amount of salt you eat  Examples of salty foods are chips, cured meats, and canned soups  Do not use table salt  · Healthcare providers may tell you not to eat spicy foods such as chilli peppers  This may help you find out if spicy food makes your BPH symptoms worse  · You may have sex if you feel well  Contact your healthcare provider if:   · There is a large amount of blood in your urine  · Your signs and symptoms get worse  · You have a fever  · You have questions or concerns about your condition or care  Seek care immediately if:   · You are unable to urinate  · Your bladder feels very full and painful  © 2017 [employer] Information is for End User's use only and may not be sold, redistributed or otherwise used for commercial purposes  All illustrations and images included in CareNotes® are the copyrighted property of [employer]  or [employer]  The above information is an  only  It is not intended as medical advice for individual conditions or treatments  Talk to your doctor, nurse or pharmacist before following any medical regimen to see if it is safe and effective for you

## 2019-08-07 NOTE — PROGRESS NOTES
Assessment/Plan:    BPH with obstruction/lower urinary tract symptoms  AUA symptom score is 17  He is mixed about his quality of life  Urinalysis is negative  PSA was satisfactory at 3 4 on May 10, 2019  His PSA was 4 8 approximately 6 weeks prior but repeat value in May was acceptable  We will plan to recheck a PSA in 1 year  Frequency of micturition  The patient is mixed about his voiding pattern  His primary complaint is frequency  He does drink a lot of fluids and drinks 5 cups of coffee per day  I recommended that is a 1st step he decrease his fluid intake  If this does not improve his voiding pattern he can consider either medical therapy or minimally invasive procedure such as urolift  He will try to decrease his fluid intake and he will call should he wish to pursue other therapy  Combined arterial insufficiency and corporo-venous occlusive erectile dysfunction  Cialis is working well for erectile dysfunction  He is satisfied with this method of therapy  He will call when he needs a prescription  Diagnoses and all orders for this visit:    BPH with obstruction/lower urinary tract symptoms  -     POCT urine dip auto non-scope  -     PSA Total, Diagnostic; Future    Frequency of micturition  -     PSA Total, Diagnostic; Future    Combined arterial insufficiency and corporo-venous occlusive erectile dysfunction    Other orders  -     Oxygen-Helium (RES-Q-AIR IN); Inhale          Subjective:      Patient ID: Dillon Little is a 82 y o  male  Benign Prostatic Hypertrophy   This is a chronic problem  The current episode started more than 1 year ago  The problem is unchanged  Irritative symptoms include frequency, nocturia and urgency  No incontinence   Obstructive symptoms include a slower stream  Obstructive symptoms do not include dribbling, incomplete emptying, an intermittent stream, straining or a weak stream  Pertinent negatives include no chills, dysuria, genital pain, hematuria, hesitancy, nausea or vomiting  AUA score is 8-19  His sexual activity is non-contributory to the current illness  The symptoms are aggravated by caffeine  Past treatments include nothing  Erectile dysfunction-Cialis is working well for erectile dysfunction  He normally takes 10 mg  His symptoms are chronic and have been stable  He is satisfied with the use of Cialis at this time  The following portions of the patient's history were reviewed and updated as appropriate: allergies, current medications, past family history, past medical history, past social history, past surgical history and problem list     Review of Systems   Constitutional: Negative for chills, diaphoresis, fatigue and fever  HENT: Negative  Eyes: Negative  Respiratory: Negative  Cardiovascular: Negative  Gastrointestinal: Negative  Negative for nausea and vomiting  Endocrine: Negative  Genitourinary: Positive for frequency, nocturia and urgency  Negative for dysuria, hematuria, hesitancy and incomplete emptying  See HPI   Musculoskeletal: Negative  Skin: Negative  Allergic/Immunologic: Negative  Neurological: Negative  Hematological: Negative  Psychiatric/Behavioral: Negative  AUA SYMPTOM SCORE      Most Recent Value   AUA SYMPTOM SCORE   How often have you had a sensation of not emptying your bladder completely after you finished urinating? 2   How often have you had to urinate again less than two hours after you finished urinating? 4   How often have you found you stopped and started again several times when you urinate? 1   How often have you found it difficult to postpone urination? 4   How often have you had a weak urinary stream?  3   How often have you had to push or strain to begin urination? 1   How many times did you most typically get up to urinate from the time you went to bed at night until the time you got up in the morning?   2   Quality of Life: If you were to spend the rest of your life with your urinary condition just the way it is now, how would you feel about that?  3   AUA SYMPTOM SCORE  17        Objective:      /68 (BP Location: Left arm, Patient Position: Sitting, Cuff Size: Standard)   Pulse (!) 50   Ht 5' 11" (1 803 m)   Wt 87 4 kg (192 lb 9 6 oz)   BMI 26 86 kg/m²          Physical Exam   Constitutional: He is oriented to person, place, and time  He appears well-developed and well-nourished  HENT:   Head: Normocephalic and atraumatic  Eyes: Conjunctivae are normal    Neck: Neck supple  Cardiovascular: Normal rate  Pulmonary/Chest: Effort normal    Abdominal: Soft  Bowel sounds are normal  He exhibits no distension and no mass  There is no tenderness  There is no rebound, no guarding and no CVA tenderness  No hernia  Genitourinary: Rectum normal, testes normal and penis normal  Right testis shows no mass  Left testis shows no mass  No phimosis or hypospadias  Genitourinary Comments: Prostate 2 5 X enlarged and palpably benign  Musculoskeletal: He exhibits no edema  Neurological: He is alert and oriented to person, place, and time  Skin: Skin is warm and dry  Psychiatric: He has a normal mood and affect  His behavior is normal  Judgment and thought content normal    Vitals reviewed

## 2019-08-07 NOTE — ASSESSMENT & PLAN NOTE
Cialis is working well for erectile dysfunction  He is satisfied with this method of therapy  He will call when he needs a prescription

## 2019-08-07 NOTE — LETTER
August 7, 2019     [address]   [address] [place] [address]    Patient: Callie Gilford   YOB: 1935   Date of Visit: 8/7/2019       Dear Dr Igor Alan: Thank you for referring Renee Che to me for evaluation  Below are my notes for this consultation  If you have questions, please do not hesitate to call me  I look forward to following your patient along with you  Sincerely,        Vicky Henning MD        CC: No Recipients  Vicky Henning MD  8/7/2019 10:04 AM  Sign at close encounter  Assessment/Plan:    BPH with obstruction/lower urinary tract symptoms  AUA symptom score is 17  He is mixed about his quality of life  Urinalysis is negative  PSA was satisfactory at 3 4 on May 10, 2019  His PSA was 4 8 approximately 6 weeks prior but repeat value in May was acceptable  We will plan to recheck a PSA in 1 year  Frequency of micturition  The patient is mixed about his voiding pattern  His primary complaint is frequency  He does drink a lot of fluids and drinks 5 cups of coffee per day  I recommended that is a 1st step he decrease his fluid intake  If this does not improve his voiding pattern he can consider either medical therapy or minimally invasive procedure such as urolift  He will try to decrease his fluid intake and he will call should he wish to pursue other therapy  Combined arterial insufficiency and corporo-venous occlusive erectile dysfunction  Cialis is working well for erectile dysfunction  He is satisfied with this method of therapy  He will call when he needs a prescription  Diagnoses and all orders for this visit:    BPH with obstruction/lower urinary tract symptoms  -     POCT urine dip auto non-scope  -     PSA Total, Diagnostic; Future    Frequency of micturition  -     PSA Total, Diagnostic;  Future    Combined arterial insufficiency and corporo-venous occlusive erectile dysfunction    Other orders  -     Oxygen-Helium (RES-Q-AIR IN); Inhale          Subjective:      Patient ID: Laura Valles is a 82 y o  male  Benign Prostatic Hypertrophy   This is a chronic problem  The current episode started more than 1 year ago  The problem is unchanged  Irritative symptoms include frequency, nocturia and urgency  No incontinence  Obstructive symptoms include a slower stream  Obstructive symptoms do not include dribbling, incomplete emptying, an intermittent stream, straining or a weak stream  Pertinent negatives include no chills, dysuria, genital pain, hematuria, hesitancy, nausea or vomiting  AUA score is 8-19  His sexual activity is non-contributory to the current illness  The symptoms are aggravated by caffeine  Past treatments include nothing  Erectile dysfunction-Cialis is working well for erectile dysfunction  He normally takes 10 mg  His symptoms are chronic and have been stable  He is satisfied with the use of Cialis at this time  The following portions of the patient's history were reviewed and updated as appropriate: allergies, current medications, past family history, past medical history, past social history, past surgical history and problem list     Review of Systems   Constitutional: Negative for chills, diaphoresis, fatigue and fever  HENT: Negative  Eyes: Negative  Respiratory: Negative  Cardiovascular: Negative  Gastrointestinal: Negative  Negative for nausea and vomiting  Endocrine: Negative  Genitourinary: Positive for frequency, nocturia and urgency  Negative for dysuria, hematuria, hesitancy and incomplete emptying  See HPI   Musculoskeletal: Negative  Skin: Negative  Allergic/Immunologic: Negative  Neurological: Negative  Hematological: Negative  Psychiatric/Behavioral: Negative  AUA SYMPTOM SCORE      Most Recent Value   AUA SYMPTOM SCORE   How often have you had a sensation of not emptying your bladder completely after you finished urinating?   2   How often have you had to urinate again less than two hours after you finished urinating? 4   How often have you found you stopped and started again several times when you urinate? 1   How often have you found it difficult to postpone urination? 4   How often have you had a weak urinary stream?  3   How often have you had to push or strain to begin urination? 1   How many times did you most typically get up to urinate from the time you went to bed at night until the time you got up in the morning? 2   Quality of Life: If you were to spend the rest of your life with your urinary condition just the way it is now, how would you feel about that?  3   AUA SYMPTOM SCORE  17        Objective:      /68 (BP Location: Left arm, Patient Position: Sitting, Cuff Size: Standard)   Pulse (!) 50   Ht 5' 11" (1 803 m)   Wt 87 4 kg (192 lb 9 6 oz)   BMI 26 86 kg/m²           Physical Exam   Constitutional: He is oriented to person, place, and time  He appears well-developed and well-nourished  HENT:   Head: Normocephalic and atraumatic  Eyes: Conjunctivae are normal    Neck: Neck supple  Cardiovascular: Normal rate  Pulmonary/Chest: Effort normal    Abdominal: Soft  Bowel sounds are normal  He exhibits no distension and no mass  There is no tenderness  There is no rebound, no guarding and no CVA tenderness  No hernia  Genitourinary: Rectum normal, testes normal and penis normal  Right testis shows no mass  Left testis shows no mass  No phimosis or hypospadias  Genitourinary Comments: Prostate 2 5 X enlarged and palpably benign  Musculoskeletal: He exhibits no edema  Neurological: He is alert and oriented to person, place, and time  Skin: Skin is warm and dry  Psychiatric: He has a normal mood and affect  His behavior is normal  Judgment and thought content normal    Vitals reviewed

## 2019-09-23 DIAGNOSIS — I48.0 PAROXYSMAL ATRIAL FIBRILLATION (HCC): ICD-10-CM

## 2019-10-22 ENCOUNTER — OFFICE VISIT (OUTPATIENT)
Dept: INTERNAL MEDICINE CLINIC | Facility: CLINIC | Age: 84
End: 2019-10-22
Payer: MEDICARE

## 2019-10-22 VITALS
SYSTOLIC BLOOD PRESSURE: 124 MMHG | HEIGHT: 70 IN | DIASTOLIC BLOOD PRESSURE: 70 MMHG | BODY MASS INDEX: 27.49 KG/M2 | RESPIRATION RATE: 14 BRPM | HEART RATE: 78 BPM | WEIGHT: 192 LBS | TEMPERATURE: 97.2 F

## 2019-10-22 DIAGNOSIS — I10 BENIGN ESSENTIAL HYPERTENSION: ICD-10-CM

## 2019-10-22 DIAGNOSIS — E78.00 PURE HYPERCHOLESTEROLEMIA: ICD-10-CM

## 2019-10-22 DIAGNOSIS — Z00.00 MEDICARE ANNUAL WELLNESS VISIT, SUBSEQUENT: Primary | ICD-10-CM

## 2019-10-22 DIAGNOSIS — I48.0 PAROXYSMAL ATRIAL FIBRILLATION (HCC): ICD-10-CM

## 2019-10-22 PROCEDURE — 99214 OFFICE O/P EST MOD 30 MIN: CPT | Performed by: INTERNAL MEDICINE

## 2019-10-22 PROCEDURE — G0439 PPPS, SUBSEQ VISIT: HCPCS | Performed by: INTERNAL MEDICINE

## 2019-10-22 NOTE — PROGRESS NOTES
Assessment/Plan:    Problem List Items Addressed This Visit        Cardiovascular and Mediastinum    Benign essential hypertension - Primary    Paroxysmal atrial fibrillation (HCC)       Other    Pure hypercholesterolemia      Other Visit Diagnoses     Medicare annual wellness visit, subsequent               Diagnoses and all orders for this visit:    Benign essential hypertension    Paroxysmal atrial fibrillation ([address] )    Pure hypercholesterolemia    Medicare annual wellness visit, subsequent        No problem-specific Assessment & Plan notes found for this encounter  We will follow up in 6 months and see how things go  Subjective:      Patient ID: Mary Daley is a 82 y o  male  The patient is doing well and we reviewed the patient BP and noted that it was good  He is doing well on the Simvastatin and notes no concerns  The patient states that there are no other issues at this time  Hypertension   This is a chronic problem  The current episode started today  The problem is unchanged  The problem is resistant  Pertinent negatives include no chest pain, palpitations or shortness of breath  There are no associated agents to hypertension  There are no known risk factors for coronary artery disease  Past treatments include ACE inhibitors, beta blockers and calcium channel blockers  The current treatment provides significant improvement  There are no compliance problems  There is no history of angina, kidney disease, CVA, heart failure, left ventricular hypertrophy, PVD or retinopathy  The following portions of the patient's history were reviewed and updated as appropriate:   He has a past medical history of Anemia (12/10/2012), BPH with obstruction/lower urinary tract symptoms, Cataract, Frequency of micturition, Hypercholesteremia, Memory loss, and Poor urinary stream ,  does not have any pertinent problems on file  ,   has a past surgical history that includes Hernia repair and Cataract extraction (Bilateral, 02/2019, 03/2019)  ,  family history includes Colon cancer in his family; Coronary artery disease in his mother; No Known Problems in his father  ,   reports that he has never smoked  He has never used smokeless tobacco  He reports that he drinks about 1 0 - 2 0 standard drinks of alcohol per week  He reports that he does not use drugs  ,  has No Known Allergies     Current Outpatient Medications   Medication Sig Dispense Refill    amLODIPine (NORVASC) 2 5 mg tablet 5 mg daily   0    aspirin (ASPIRIN LOW DOSE) 81 MG tablet Take 1 tablet by mouth daily      Calcium Carbonate-Vitamin D (CALTRATE 600+D) 600-400 MG-UNIT per tablet Take by mouth      coenzyme Q-10 100 MG capsule Take 1 capsule by mouth daily        metoprolol tartrate (LOPRESSOR) 25 mg tablet Take 1 tablet (25 mg total) by mouth every 12 (twelve) hours 180 tablet 1    Multiple Vitamins-Minerals (CENTRUM ADULTS PO) Take 1 tablet by mouth daily      Omega-3 1000 MG CAPS Take 1 capsule by mouth 2 (two) times a day        ramipril (ALTACE) 10 MG capsule Take by mouth daily        simvastatin (ZOCOR) 40 mg tablet take 1 tablet by mouth once daily 90 tablet 3    tadalafil (CIALIS) 20 MG tablet Take 1 tablet (20 mg total) by mouth daily as needed for erectile dysfunction 24 tablet 5     No current facility-administered medications for this visit  Review of Systems   Constitutional: Negative for chills, fatigue and fever  HENT: Negative  Respiratory: Negative for cough, chest tightness and shortness of breath  Cardiovascular: Negative for chest pain and palpitations  Gastrointestinal: Negative for abdominal pain, constipation, diarrhea, nausea and vomiting  Genitourinary: Negative  Musculoskeletal: Negative for back pain and myalgias  Skin: Negative  Neurological: Negative  Psychiatric/Behavioral: Negative for dysphoric mood  The patient is not nervous/anxious            Objective:  Vitals:    10/22/19 1047   BP: 124/70   Pulse: 78   Resp: 14   Temp: (!) 97 2 °F (36 2 °C)   Weight: 87 1 kg (192 lb)   Height: 5' 10" (1 778 m)     Body mass index is 27 55 kg/m²  Physical Exam   Constitutional: He is oriented to person, place, and time  He appears well-developed and well-nourished  HENT:   Head: Normocephalic and atraumatic  Eyes: Pupils are equal, round, and reactive to light  EOM are normal    Neck: Normal range of motion  Neck supple  Cardiovascular: Normal rate, regular rhythm, normal heart sounds and intact distal pulses  No murmur heard  Pulmonary/Chest: Effort normal and breath sounds normal  No stridor  He has no rales  Abdominal: Soft  Bowel sounds are normal  He exhibits no distension  There is no tenderness  Musculoskeletal: Normal range of motion  He exhibits no edema or deformity  Neurological: He is alert and oriented to person, place, and time  Skin: Skin is warm and dry  Psychiatric: He has a normal mood and affect  Nursing note and vitals reviewed         PHQ-9 Depression Screening    PHQ-9:    Frequency of the following problems over the past two weeks:       Little interest or pleasure in doing things:  0 - not at all  Feeling down, depressed, or hopeless:  0 - not at all  PHQ-2 Score:  0

## 2019-10-22 NOTE — PROGRESS NOTES
Assessment and Plan:     Problem List Items Addressed This Visit     None           Preventive health issues were discussed with patient, and age appropriate screening tests were ordered as noted in patient's After Visit Summary  Personalized health advice and appropriate referrals for health education or preventive services given if needed, as noted in patient's After Visit Summary       History of Present Illness:     Patient presents for Medicare Annual Wellness visit    Patient Care Team:  Estuardo Hemphill DO as PCP - General (Internal Medicine)  Cleo Hearn MD (Cardiology)  Jf Finnegan MD (Urology)     Problem List:     Patient Active Problem List   Diagnosis    Benign colon polyp    Benign essential hypertension    D-dimer, elevated    Combined arterial insufficiency and corporo-venous occlusive erectile dysfunction    Pure hypercholesterolemia    Paroxysmal atrial fibrillation (HCC)    Diverticulosis    Acquired trigger finger    Low back pain    BPH with obstruction/lower urinary tract symptoms    Pre-operative clearance    Excessive sweating    Frequency of micturition      Past Medical and Surgical History:     Past Medical History:   Diagnosis Date    Anemia 12/10/2012    BPH with obstruction/lower urinary tract symptoms     Cataract     Comb artrl insuff & corporo-venous occlusv erectile dysfnct     Frequency of micturition     Hypercholesteremia     Hypertension     Memory loss     Poor urinary stream      Past Surgical History:   Procedure Laterality Date    CATARACT EXTRACTION Bilateral 02/2019, 03/2019    HERNIA REPAIR        Family History:     Family History   Problem Relation Age of Onset    Coronary artery disease Mother     No Known Problems Father     Colon cancer Family       Social History:     Social History     Socioeconomic History    Marital status: /Civil Union     Spouse name: None    Number of children: None    Years of education: None    Highest education level: None   Occupational History    Occupation: RETIRED   Social Needs    Financial resource strain: None    Food insecurity:     Worry: None     Inability: None    Transportation needs:     Medical: None     Non-medical: None   Tobacco Use    Smoking status: Never Smoker    Smokeless tobacco: Never Used   Substance and Sexual Activity    Alcohol use:  Yes     Alcohol/week: 1 0 - 2 0 standard drinks     Types: 1 - 2 Glasses of wine per week     Comment: weekly    Drug use: No    Sexual activity: None   Lifestyle    Physical activity:     Days per week: None     Minutes per session: None    Stress: None   Relationships    Social connections:     Talks on phone: None     Gets together: None     Attends Restoration service: None     Active member of club or organization: None     Attends meetings of clubs or organizations: None     Relationship status: None    Intimate partner violence:     Fear of current or ex partner: None     Emotionally abused: None     Physically abused: None     Forced sexual activity: None   Other Topics Concern    None   Social History Narrative        RETIRED       Medications and Allergies:     Current Outpatient Medications   Medication Sig Dispense Refill    amLODIPine (NORVASC) 2 5 mg tablet 5 mg daily   0    aspirin (ASPIRIN LOW DOSE) 81 MG tablet Take 1 tablet by mouth daily      Calcium Carbonate-Vitamin D (CALTRATE 600+D) 600-400 MG-UNIT per tablet Take by mouth      coenzyme Q-10 100 MG capsule Take 1 capsule by mouth daily        metoprolol tartrate (LOPRESSOR) 25 mg tablet Take 1 tablet (25 mg total) by mouth every 12 (twelve) hours 180 tablet 1    Multiple Vitamins-Minerals (CENTRUM ADULTS PO) Take 1 tablet by mouth daily      Omega-3 1000 MG CAPS Take 1 capsule by mouth 2 (two) times a day        ramipril (ALTACE) 10 MG capsule Take by mouth daily        simvastatin (ZOCOR) 40 mg tablet take 1 tablet by mouth once daily 90 tablet 3  tadalafil (CIALIS) 20 MG tablet Take 1 tablet (20 mg total) by mouth daily as needed for erectile dysfunction 24 tablet 5     No current facility-administered medications for this visit  No Known Allergies   Immunizations:     Immunization History   Administered Date(s) Administered    INFLUENZA 09/30/2015, 09/07/2018, 10/15/2018, 10/03/2019    Influenza Quadrivalent Preservative Free 3 years and older IM 09/30/2015    Influenza Split High Dose Preservative Free IM 10/05/2016, 10/09/2017    Influenza TIV (IM) 10/03/2012, 10/02/2013    Pneumococcal Conjugate 13-Valent 03/21/2016, 04/05/2017    Tdap 03/02/2015    Zoster Vaccine Recombinant 10/16/2018      Health Maintenance: There are no preventive care reminders to display for this patient  Topic Date Due    Pneumococcal Vaccine: 65+ Years (2 of 2 - PPSV23) 04/05/2018      Medicare Health Risk Assessment:     /70   Pulse 78   Temp (!) 97 2 °F (36 2 °C)   Resp 14   Ht 5' 10" (1 778 m)   Wt 87 1 kg (192 lb)   BMI 27 55 kg/m²      Jakub León is here for his Subsequent Wellness visit  Health Risk Assessment:   Patient rates overall health as very good  Patient feels that their physical health rating is same  Eyesight was rated as slightly worse  Hearing was rated as same  Patient feels that their emotional and mental health rating is same  Pain experienced in the last 7 days has been none  Patient states that he has experienced no weight loss or gain in last 6 months  Depression Screening:   PHQ-2 Score: 0      Fall Risk Screening: In the past year, patient has experienced: no history of falling in past year      Home Safety:  Patient does not have trouble with stairs inside or outside of their home  Patient has working smoke alarms and has working carbon monoxide detector  Home safety hazards include: none  Nutrition:   Current diet is Regular and Limited junk food       Medications:   Patient is currently taking over-the-counter supplements  OTC medications include: see medication list  Patient is able to manage medications  Activities of Daily Living (ADLs)/Instrumental Activities of Daily Living (IADLs):   Walk and transfer into and out of bed and chair?: Yes  Dress and groom yourself?: Yes    Bathe or shower yourself?: Yes    Feed yourself? Yes  Do your laundry/housekeeping?: Yes  Manage your money, pay your bills and track your expenses?: Yes  Make your own meals?: Yes    Do your own shopping?: Yes    Previous Hospitalizations:   Any hospitalizations or ED visits within the last 12 months?: No      Advance Care Planning:   Living will: Yes    Durable POA for healthcare:  Yes    Advanced directive: Yes    Advanced directive counseling given: Yes    Five wishes given: No    Patient declined ACP directive: No    End of Life Decisions reviewed with patient: Yes    Provider agrees with end of life decisions: Yes      Cognitive Screening:   Provider or family/friend/caregiver concerned regarding cognition?: No    PREVENTIVE SCREENINGS      Cardiovascular Screening:    General: Screening Not Indicated and History Lipid Disorder      Diabetes Screening:     General: Screening Current      Colorectal Cancer Screening:     General: Screening Not Indicated      Prostate Cancer Screening:    General: Screening Not Indicated      Osteoporosis Screening:    General: Screening Not Indicated      Abdominal Aortic Aneurysm (AAA) Screening:        General: Screening Not Indicated      Lung Cancer Screening:     General: Screening Not Indicated      Hepatitis C Screening:    General: Screening Not Indicated      Ernesto Ambrocio DO

## 2019-10-22 NOTE — PATIENT INSTRUCTIONS

## 2020-02-05 ENCOUNTER — OFFICE VISIT (OUTPATIENT)
Dept: URGENT CARE | Facility: CLINIC | Age: 85
End: 2020-02-05
Payer: MEDICARE

## 2020-02-05 VITALS
SYSTOLIC BLOOD PRESSURE: 140 MMHG | HEART RATE: 88 BPM | TEMPERATURE: 98.3 F | RESPIRATION RATE: 16 BRPM | OXYGEN SATURATION: 97 % | DIASTOLIC BLOOD PRESSURE: 77 MMHG

## 2020-02-05 DIAGNOSIS — J06.9 UPPER RESPIRATORY INFECTION, VIRAL: Primary | ICD-10-CM

## 2020-02-05 PROCEDURE — 99213 OFFICE O/P EST LOW 20 MIN: CPT | Performed by: NURSE PRACTITIONER

## 2020-02-05 PROCEDURE — G0463 HOSPITAL OUTPT CLINIC VISIT: HCPCS | Performed by: NURSE PRACTITIONER

## 2020-02-05 NOTE — PROGRESS NOTES
[email or web address]        NAME: Jaime Segovia is a 82 y o  male  : 1935    MRN: [id number]  DATE: 2020  TIME: 10:57 AM    Assessment and Plan   Upper respiratory infection, viral [J06 9]  1  Upper respiratory infection, viral           Patient Instructions       Follow up with PCP in 3-5 days  Proceed to  ER if symptoms worsen  You have a viral upper respiratory infection  You may use saline nasal spray if you have nasal congestion  You may also use corcidin HBP   You may use delsym or plain robitussin for cough  Increase water intake  Follow up with your PCP  Go to the ED if symptoms worsen          Chief Complaint     Chief Complaint   Patient presents with    Cough     Wife reports a cough and a fever for three days  History of Present Illness       This is an 82year old male who comes to the  with his wife as she states he has had a slight cough, nasal congestion and she thought he was sweaty last night  She did not take his temperature  She states she doesn't want this to go to his lungs  Wife states she is doing honey, garlic and chicken soup   Pt VSS  Cough         Review of Systems   Review of Systems   Constitutional:        Sweats   HENT: Positive for congestion  Eyes: Negative  Respiratory: Positive for cough  Cardiovascular: Negative  Gastrointestinal: Negative  Endocrine: Negative  Genitourinary: Negative  Musculoskeletal: Negative  Skin: Negative  Allergic/Immunologic: Negative  Neurological: Negative  Hematological: Negative  Psychiatric/Behavioral: Negative            Current Medications       Current Outpatient Medications:     amLODIPine (NORVASC) 2 5 mg tablet, 5 mg daily , Disp: , Rfl: 0    aspirin (ASPIRIN LOW DOSE) 81 MG tablet, Take 1 tablet by mouth daily, Disp: , Rfl:     Calcium Carbonate-Vitamin D (CALTRATE 600+D) 600-400 MG-UNIT per tablet, Take by mouth, Disp: , Rfl:     coenzyme Q-10 100 MG capsule, Take 1 capsule by mouth daily  , Disp: , Rfl:     metoprolol tartrate (LOPRESSOR) 25 mg tablet, Take 1 tablet (25 mg total) by mouth every 12 (twelve) hours, Disp: 180 tablet, Rfl: 1    Multiple Vitamins-Minerals (CENTRUM ADULTS PO), Take 1 tablet by mouth daily, Disp: , Rfl:     Omega-3 1000 MG CAPS, Take 1 capsule by mouth 2 (two) times a day  , Disp: , Rfl:     ramipril (ALTACE) 10 MG capsule, Take by mouth daily  , Disp: , Rfl:     simvastatin (ZOCOR) 40 mg tablet, take 1 tablet by mouth once daily, Disp: 90 tablet, Rfl: 3    tadalafil (CIALIS) 20 MG tablet, Take 1 tablet (20 mg total) by mouth daily as needed for erectile dysfunction, Disp: 24 tablet, Rfl: 5    Current Allergies     Allergies as of 02/05/2020    (No Known Allergies)            The following portions of the patient's history were reviewed and updated as appropriate: allergies, current medications, past family history, past medical history, past social history, past surgical history and problem list      Past Medical History:   Diagnosis Date    Anemia 12/10/2012    BPH with obstruction/lower urinary tract symptoms     Cataract     Frequency of micturition     Hypercholesteremia     Memory loss     Poor urinary stream        Past Surgical History:   Procedure Laterality Date    CATARACT EXTRACTION Bilateral 02/2019, 03/2019    HERNIA REPAIR         Family History   Problem Relation Age of Onset    Coronary artery disease Mother     No Known Problems Father     Colon cancer Family          Medications have been verified  Objective   /77   Pulse 88   Temp 98 3 °F (36 8 °C)   Resp 16   SpO2 97%        Physical Exam     Physical Exam   Constitutional: He is oriented to person, place, and time  He appears well-developed and well-nourished  No distress  HENT:   Head: Normocephalic and atraumatic     Right Ear: External ear normal    Left Ear: External ear normal    Nose: Nose normal    Mouth/Throat: Oropharynx is clear and moist  No oropharyngeal exudate  Eyes: Pupils are equal, round, and reactive to light  EOM are normal    Neck: Normal range of motion  Neck supple  Cardiovascular: Normal rate, regular rhythm and normal heart sounds  Pulmonary/Chest: Effort normal and breath sounds normal    Abdominal: Soft  Bowel sounds are normal  He exhibits no distension  There is no tenderness  Musculoskeletal: Normal range of motion  Neurological: He is alert and oriented to person, place, and time  Skin: Skin is warm and dry  Capillary refill takes less than 2 seconds  He is not diaphoretic  Psychiatric: He has a normal mood and affect  His behavior is normal  Judgment and thought content normal    Nursing note and vitals reviewed

## 2020-02-05 NOTE — PATIENT INSTRUCTIONS
You have a viral upper respiratory infection  You may use saline nasal spray if you have nasal congestion  You may also use corcidin HBP   You may use delsym or plain robitussin for cough  Increase water intake  Follow up with your PCP  Go to the ED if symptoms worsen    Upper Respiratory Infection   WHAT YOU NEED TO KNOW:   An upper respiratory infection is also called the common cold  It is an infection that can affect your nose, throat, ears, and sinuses  For healthy people, the common cold is usually not serious and does not need special treatment  Cold symptoms are usually worst for the first 3 to 5 days  Most people get better in 7 to 14 days  You may continue to cough for 2 to 3 weeks  Colds are caused by viruses and do not get better with antibiotics  DISCHARGE INSTRUCTIONS:   Return to the emergency department if:   · You have chest pain or trouble breathing  Contact your healthcare provider if:   · You have a fever over 102ºF (39°C)  · Your sore throat gets worse or you see white or yellow spots in your throat  · Your symptoms get worse after 3 to 5 days or your cold is not better in 14 days  · You have a rash anywhere on your skin  · You have large, tender lumps in your neck  · You have thick, green or yellow drainage from your nose  · You cough up thick yellow, green, or bloody mucus  · You have vomiting for more than 24 hours and cannot keep fluids down  · You have a bad earache  · You have questions or concerns about your condition or care  Medicines: You may need any of the following:  · Decongestants  help reduce nasal congestion and help you breathe more easily  If you take decongestant pills, they may make you feel restless or cause problems with your sleep  Do not use decongestant sprays for more than a few days  · Cough suppressants  help reduce coughing  Ask your healthcare provider which type of cough medicine is best for you       · NSAIDs , such as ibuprofen, help decrease swelling, pain, and fever  NSAIDs can cause stomach bleeding or kidney problems in certain people  If you take blood thinner medicine, always ask your healthcare provider if NSAIDs are safe for you  Always read the medicine label and follow directions  · Acetaminophen  decreases pain and fever  It is available without a doctor's order  Ask how much to take and how often to take it  Follow directions  Read the labels of all other medicines you are using to see if they also contain acetaminophen, or ask your doctor or pharmacist  Acetaminophen can cause liver damage if not taken correctly  Do not use more than 4 grams (4,000 milligrams) total of acetaminophen in one day  · Take your medicine as directed  Contact your healthcare provider if you think your medicine is not helping or if you have side effects  Tell him or her if you are allergic to any medicine  Keep a list of the medicines, vitamins, and herbs you take  Include the amounts, and when and why you take them  Bring the list or the pill bottles to follow-up visits  Carry your medicine list with you in case of an emergency  Follow up with your healthcare provider as directed:  Write down your questions so you remember to ask them during your visits  Self-care:   · Rest as much as possible  Slowly start to do more each day  · Drink more liquids as directed  Liquids will help thin and loosen mucus so you can cough it up  Liquids will also help prevent dehydration  Liquids that help prevent dehydration include water, fruit juice, and broth  Do not drink liquids that contain caffeine  Caffeine can increase your risk for dehydration  Ask your healthcare provider how much liquid to drink each day  · Soothe a sore throat  Gargle with warm salt water  This helps your sore throat feel better  Make salt water by dissolving ¼ teaspoon salt in 1 cup warm water  You may also suck on hard candy or throat lozenges   You may use a sore throat spray  · Use a humidifier or vaporizer  Use a cool mist humidifier or a vaporizer to increase air moisture in your home  This may make it easier for you to breathe and help decrease your cough  · Use saline nasal drops as directed  These help relieve congestion  · Apply petroleum-based jelly around the outside of your nostrils  This can decrease irritation from blowing your nose  · Do not smoke  Nicotine and other chemicals in cigarettes and cigars can make your symptoms worse  They can also cause infections such as bronchitis or pneumonia  Ask your healthcare provider for information if you currently smoke and need help to quit  E-cigarettes or smokeless tobacco still contain nicotine  Talk to your healthcare provider before you use these products  Prevent spreading your cold to others:   · Try to stay away from other people during the first 2 to 3 days of your cold when it is more easily spread  · Do not share food or drinks  · Do not share hand towels with household members  · Wash your hands often, especially after you blow your nose  Turn away from other people and cover your mouth and nose with a tissue when you sneeze or cough  © 2017 [facility] Information is for End User's use only and may not be sold, redistributed or otherwise used for commercial purposes  All illustrations and images included in CareNotes® are the copyrighted property of [employer]  or [employer]  The above information is an  only  It is not intended as medical advice for individual conditions or treatments  Talk to your doctor, nurse or pharmacist before following any medical regimen to see if it is safe and effective for you

## 2020-02-26 DIAGNOSIS — E78.5 HYPERLIPIDEMIA, UNSPECIFIED HYPERLIPIDEMIA TYPE: ICD-10-CM

## 2020-02-26 RX ORDER — SIMVASTATIN 40 MG
40 TABLET ORAL DAILY
Qty: 90 TABLET | Refills: 3 | Status: SHIPPED | OUTPATIENT
Start: 2020-02-26 | End: 2021-03-12 | Stop reason: SDUPTHER

## 2020-02-26 NOTE — TELEPHONE ENCOUNTER
Requested medication(s) are due for refill today: Yes  Patient has already received a courtesy refill: No  Other reason request has been forwarded to provider: Please Review

## 2020-03-19 DIAGNOSIS — I48.0 PAROXYSMAL ATRIAL FIBRILLATION (HCC): ICD-10-CM

## 2020-07-14 ENCOUNTER — OFFICE VISIT (OUTPATIENT)
Dept: INTERNAL MEDICINE CLINIC | Facility: CLINIC | Age: 85
End: 2020-07-14
Payer: MEDICARE

## 2020-07-14 VITALS
HEIGHT: 70 IN | RESPIRATION RATE: 16 BRPM | BODY MASS INDEX: 27.54 KG/M2 | TEMPERATURE: 98.7 F | WEIGHT: 192.4 LBS | HEART RATE: 77 BPM | OXYGEN SATURATION: 97 %

## 2020-07-14 DIAGNOSIS — I10 BENIGN ESSENTIAL HYPERTENSION: Primary | ICD-10-CM

## 2020-07-14 DIAGNOSIS — E78.00 PURE HYPERCHOLESTEROLEMIA: ICD-10-CM

## 2020-07-14 DIAGNOSIS — R41.3 MEMORY CHANGE: ICD-10-CM

## 2020-07-14 DIAGNOSIS — I48.0 PAROXYSMAL ATRIAL FIBRILLATION (HCC): ICD-10-CM

## 2020-07-14 PROCEDURE — 3008F BODY MASS INDEX DOCD: CPT | Performed by: INTERNAL MEDICINE

## 2020-07-14 PROCEDURE — 1036F TOBACCO NON-USER: CPT | Performed by: INTERNAL MEDICINE

## 2020-07-14 PROCEDURE — 3078F DIAST BP <80 MM HG: CPT | Performed by: INTERNAL MEDICINE

## 2020-07-14 PROCEDURE — 99214 OFFICE O/P EST MOD 30 MIN: CPT | Performed by: INTERNAL MEDICINE

## 2020-07-14 PROCEDURE — 4040F PNEUMOC VAC/ADMIN/RCVD: CPT | Performed by: INTERNAL MEDICINE

## 2020-07-14 PROCEDURE — 1160F RVW MEDS BY RX/DR IN RCRD: CPT | Performed by: INTERNAL MEDICINE

## 2020-07-14 PROCEDURE — 3077F SYST BP >= 140 MM HG: CPT | Performed by: INTERNAL MEDICINE

## 2020-07-14 RX ORDER — DIAPER,BRIEF,ADULT, DISPOSABLE
EACH MISCELLANEOUS DAILY
COMMUNITY

## 2020-07-14 NOTE — PROGRESS NOTES
Assessment/Plan:    Problem List Items Addressed This Visit        Cardiovascular and Mediastinum    Benign essential hypertension - Primary    Relevant Medications    metoprolol tartrate (LOPRESSOR) 25 mg tablet    Other Relevant Orders    Microalbumin / creatinine urine ratio    CBC and differential    Paroxysmal atrial fibrillation (HCC)    Relevant Medications    metoprolol tartrate (LOPRESSOR) 25 mg tablet    Other Relevant Orders    TSH, 3rd generation with Free T4 reflex       Other    Pure hypercholesterolemia    Relevant Orders    Comprehensive metabolic panel    Lipid Panel with Direct LDL reflex      Other Visit Diagnoses     Memory change        Relevant Orders    CT head wo contrast           Diagnoses and all orders for this visit:    Benign essential hypertension  -     Microalbumin / creatinine urine ratio  -     CBC and differential; Future    Paroxysmal atrial fibrillation (HCC)  -     metoprolol tartrate (LOPRESSOR) 25 mg tablet; Take 1 tablet (25 mg total) by mouth every 12 (twelve) hours  -     TSH, 3rd generation with Free T4 reflex; Future    Pure hypercholesterolemia  -     Comprehensive metabolic panel; Future  -     Lipid Panel with Direct LDL reflex; Future    Memory change  -     CT head wo contrast; Future    Other orders  -     Lecithin 1200 MG CAPS; Take by mouth daily        No problem-specific Assessment & Plan notes found for this encounter  Subjective: Changes in short term memory are noted by his wife     Patient ID: Carol Rosa is a 82 y o  male  The patient's wife notes that he has had issues with short term memory  He is able to drive, but is noted to repeatedly ask the same question  His BP is good on the above medication  He has no other issues at this time and states that he is doing well with his statin medication  We spent 30 minutes discussing types of dementia and treatment for them  I noted that these were irreversible and progressive    I offered follow up with neuro and CT scan and they accepted the CT scan  The following portions of the patient's history were reviewed and updated as appropriate:   He has a past medical history of Anemia (12/10/2012), BPH with obstruction/lower urinary tract symptoms, Cataract, Frequency of micturition, Hypercholesteremia, Memory loss, and Poor urinary stream ,  does not have any pertinent problems on file  ,   has a past surgical history that includes Hernia repair and Cataract extraction (Bilateral, 02/2019, 03/2019)  ,  family history includes Colon cancer in his family; Coronary artery disease in his mother; No Known Problems in his father  ,   reports that he has never smoked  He has never used smokeless tobacco  He reports that he drinks about 1 0 - 2 0 standard drinks of alcohol per week  He reports that he does not use drugs  ,  has No Known Allergies     Current Outpatient Medications   Medication Sig Dispense Refill    amLODIPine (NORVASC) 5 mg tablet 5 mg daily   0    aspirin (ASPIRIN LOW DOSE) 81 MG tablet Take 1 tablet by mouth daily      Calcium Carbonate-Vitamin D (CALTRATE 600+D) 600-400 MG-UNIT per tablet Take by mouth      coenzyme Q-10 100 MG capsule Take 1 capsule by mouth daily        Lecithin 1200 MG CAPS Take by mouth daily      metoprolol tartrate (LOPRESSOR) 25 mg tablet Take 1 tablet (25 mg total) by mouth every 12 (twelve) hours 180 tablet 1    Multiple Vitamins-Minerals (CENTRUM ADULTS PO) Take 1 tablet by mouth daily      Omega-3 1000 MG CAPS Take 1 capsule by mouth 2 (two) times a day        ramipril (ALTACE) 10 MG capsule Take by mouth daily        simvastatin (ZOCOR) 40 mg tablet Take 1 tablet (40 mg total) by mouth daily 90 tablet 3    tadalafil (CIALIS) 20 MG tablet Take 1 tablet (20 mg total) by mouth daily as needed for erectile dysfunction (Patient not taking: Reported on 7/14/2020) 24 tablet 5     No current facility-administered medications for this visit          Review of Systems   Constitutional: Negative for chills, fatigue and fever  HENT: Negative  Respiratory: Negative for cough, chest tightness and shortness of breath  Cardiovascular: Negative for chest pain and palpitations  Gastrointestinal: Negative for abdominal pain, constipation, diarrhea, nausea and vomiting  Genitourinary: Negative  Musculoskeletal: Negative for back pain and myalgias  Skin: Negative  Neurological: Negative  Psychiatric/Behavioral: Negative for dysphoric mood  The patient is not nervous/anxious  Objective:  Vitals:    07/14/20 1051   Pulse: 77   Resp: 16   Temp: 98 7 °F (37 1 °C)   SpO2: 97%   Weight: 87 3 kg (192 lb 6 4 oz)   Height: 5' 10" (1 778 m)     Body mass index is 27 61 kg/m²  Physical Exam   Constitutional: He is oriented to person, place, and time  He appears well-developed and well-nourished  HENT:   Head: Normocephalic and atraumatic  Eyes: Pupils are equal, round, and reactive to light  EOM are normal    Neck: Normal range of motion  Neck supple  Cardiovascular: Normal rate, regular rhythm, normal heart sounds and intact distal pulses  No murmur heard  Pulmonary/Chest: Effort normal and breath sounds normal  No stridor  He has no rales  Abdominal: Soft  Bowel sounds are normal  He exhibits no distension  There is no tenderness  Musculoskeletal: Normal range of motion  He exhibits no edema, tenderness or deformity  Neurological: He is alert and oriented to person, place, and time  No cranial nerve deficit  Coordination normal    Skin: Skin is warm and dry  Psychiatric: He has a normal mood and affect  Nursing note and vitals reviewed         PHQ-9 Depression Screening    PHQ-9:    Frequency of the following problems over the past two weeks:       Little interest or pleasure in doing things:  0 - not at all  Feeling down, depressed, or hopeless:  0 - not at all  PHQ-2 Score:  0

## 2020-07-20 ENCOUNTER — APPOINTMENT (OUTPATIENT)
Dept: LAB | Facility: CLINIC | Age: 85
End: 2020-07-20
Payer: MEDICARE

## 2020-07-20 DIAGNOSIS — E78.00 PURE HYPERCHOLESTEROLEMIA: ICD-10-CM

## 2020-07-20 DIAGNOSIS — I10 BENIGN ESSENTIAL HYPERTENSION: ICD-10-CM

## 2020-07-20 DIAGNOSIS — I48.0 PAROXYSMAL ATRIAL FIBRILLATION (HCC): ICD-10-CM

## 2020-07-20 LAB
ALBUMIN SERPL BCP-MCNC: 3.7 G/DL (ref 3.5–5)
ALP SERPL-CCNC: 44 U/L (ref 46–116)
ALT SERPL W P-5'-P-CCNC: 19 U/L (ref 12–78)
ANION GAP SERPL CALCULATED.3IONS-SCNC: 6 MMOL/L (ref 4–13)
AST SERPL W P-5'-P-CCNC: 15 U/L (ref 5–45)
BASOPHILS # BLD AUTO: 0.04 THOUSANDS/ΜL (ref 0–0.1)
BASOPHILS NFR BLD AUTO: 1 % (ref 0–1)
BILIRUB SERPL-MCNC: 0.73 MG/DL (ref 0.2–1)
BUN SERPL-MCNC: 14 MG/DL (ref 5–25)
CALCIUM SERPL-MCNC: 10.1 MG/DL (ref 8.3–10.1)
CHLORIDE SERPL-SCNC: 108 MMOL/L (ref 100–108)
CHOLEST SERPL-MCNC: 114 MG/DL (ref 50–200)
CO2 SERPL-SCNC: 27 MMOL/L (ref 21–32)
CREAT SERPL-MCNC: 0.92 MG/DL (ref 0.6–1.3)
CREAT UR-MCNC: 27.5 MG/DL
EOSINOPHIL # BLD AUTO: 0.2 THOUSAND/ΜL (ref 0–0.61)
EOSINOPHIL NFR BLD AUTO: 4 % (ref 0–6)
ERYTHROCYTE [DISTWIDTH] IN BLOOD BY AUTOMATED COUNT: 13.2 % (ref 11.6–15.1)
GFR SERPL CREATININE-BSD FRML MDRD: 77 ML/MIN/1.73SQ M
GLUCOSE P FAST SERPL-MCNC: 100 MG/DL (ref 65–99)
HCT VFR BLD AUTO: 44.4 % (ref 36.5–49.3)
HDLC SERPL-MCNC: 30 MG/DL
HGB BLD-MCNC: 15 G/DL (ref 12–17)
IMM GRANULOCYTES # BLD AUTO: 0.01 THOUSAND/UL (ref 0–0.2)
IMM GRANULOCYTES NFR BLD AUTO: 0 % (ref 0–2)
LDLC SERPL CALC-MCNC: 69 MG/DL (ref 0–100)
LYMPHOCYTES # BLD AUTO: 1.14 THOUSANDS/ΜL (ref 0.6–4.47)
LYMPHOCYTES NFR BLD AUTO: 22 % (ref 14–44)
MCH RBC QN AUTO: 32.5 PG (ref 26.8–34.3)
MCHC RBC AUTO-ENTMCNC: 33.8 G/DL (ref 31.4–37.4)
MCV RBC AUTO: 96 FL (ref 82–98)
MICROALBUMIN UR-MCNC: 7.4 MG/L (ref 0–20)
MICROALBUMIN/CREAT 24H UR: 27 MG/G CREATININE (ref 0–30)
MONOCYTES # BLD AUTO: 0.47 THOUSAND/ΜL (ref 0.17–1.22)
MONOCYTES NFR BLD AUTO: 9 % (ref 4–12)
NEUTROPHILS # BLD AUTO: 3.23 THOUSANDS/ΜL (ref 1.85–7.62)
NEUTS SEG NFR BLD AUTO: 64 % (ref 43–75)
NRBC BLD AUTO-RTO: 0 /100 WBCS
PLATELET # BLD AUTO: 197 THOUSANDS/UL (ref 149–390)
PMV BLD AUTO: 9.2 FL (ref 8.9–12.7)
POTASSIUM SERPL-SCNC: 3.9 MMOL/L (ref 3.5–5.3)
PROT SERPL-MCNC: 7.3 G/DL (ref 6.4–8.2)
RBC # BLD AUTO: 4.61 MILLION/UL (ref 3.88–5.62)
SODIUM SERPL-SCNC: 141 MMOL/L (ref 136–145)
TRIGL SERPL-MCNC: 74 MG/DL
TSH SERPL DL<=0.05 MIU/L-ACNC: 3.44 UIU/ML (ref 0.36–3.74)
WBC # BLD AUTO: 5.09 THOUSAND/UL (ref 4.31–10.16)

## 2020-07-20 PROCEDURE — 80053 COMPREHEN METABOLIC PANEL: CPT

## 2020-07-20 PROCEDURE — 82570 ASSAY OF URINE CREATININE: CPT | Performed by: INTERNAL MEDICINE

## 2020-07-20 PROCEDURE — 80061 LIPID PANEL: CPT

## 2020-07-20 PROCEDURE — 85025 COMPLETE CBC W/AUTO DIFF WBC: CPT

## 2020-07-20 PROCEDURE — 82043 UR ALBUMIN QUANTITATIVE: CPT | Performed by: INTERNAL MEDICINE

## 2020-07-20 PROCEDURE — 84443 ASSAY THYROID STIM HORMONE: CPT

## 2020-07-20 PROCEDURE — 36415 COLL VENOUS BLD VENIPUNCTURE: CPT

## 2020-07-24 ENCOUNTER — HOSPITAL ENCOUNTER (OUTPATIENT)
Dept: CT IMAGING | Facility: HOSPITAL | Age: 85
Discharge: HOME | End: 2020-07-24
Payer: MEDICARE

## 2020-07-24 DIAGNOSIS — R41.3 MEMORY CHANGE: ICD-10-CM

## 2020-07-24 PROCEDURE — 70450 CT HEAD/BRAIN W/O DYE: CPT

## 2020-07-28 ENCOUNTER — TELEPHONE (OUTPATIENT)
Dept: INTERNAL MEDICINE CLINIC | Facility: CLINIC | Age: 85
End: 2020-07-28

## 2020-08-12 DIAGNOSIS — N13.8 BPH WITH OBSTRUCTION/LOWER URINARY TRACT SYMPTOMS: Primary | ICD-10-CM

## 2020-08-12 DIAGNOSIS — N40.1 BPH WITH OBSTRUCTION/LOWER URINARY TRACT SYMPTOMS: Primary | ICD-10-CM

## 2020-08-18 ENCOUNTER — OFFICE VISIT (OUTPATIENT)
Dept: UROLOGY | Facility: MEDICAL CENTER | Age: 85
End: 2020-08-18
Payer: MEDICARE

## 2020-08-18 VITALS
DIASTOLIC BLOOD PRESSURE: 70 MMHG | BODY MASS INDEX: 27.63 KG/M2 | TEMPERATURE: 97.3 F | WEIGHT: 193 LBS | HEIGHT: 70 IN | SYSTOLIC BLOOD PRESSURE: 120 MMHG

## 2020-08-18 DIAGNOSIS — R35.0 FREQUENCY OF MICTURITION: Primary | ICD-10-CM

## 2020-08-18 DIAGNOSIS — N52.03 COMBINED ARTERIAL INSUFFICIENCY AND CORPORO-VENOUS OCCLUSIVE ERECTILE DYSFUNCTION: ICD-10-CM

## 2020-08-18 DIAGNOSIS — N40.1 BPH WITH OBSTRUCTION/LOWER URINARY TRACT SYMPTOMS: ICD-10-CM

## 2020-08-18 DIAGNOSIS — N13.8 BPH WITH OBSTRUCTION/LOWER URINARY TRACT SYMPTOMS: ICD-10-CM

## 2020-08-18 PROCEDURE — 3008F BODY MASS INDEX DOCD: CPT | Performed by: UROLOGY

## 2020-08-18 PROCEDURE — 3078F DIAST BP <80 MM HG: CPT | Performed by: UROLOGY

## 2020-08-18 PROCEDURE — 3074F SYST BP LT 130 MM HG: CPT | Performed by: UROLOGY

## 2020-08-18 PROCEDURE — 1036F TOBACCO NON-USER: CPT | Performed by: UROLOGY

## 2020-08-18 PROCEDURE — 99214 OFFICE O/P EST MOD 30 MIN: CPT | Performed by: UROLOGY

## 2020-08-18 PROCEDURE — 1160F RVW MEDS BY RX/DR IN RCRD: CPT | Performed by: UROLOGY

## 2020-08-18 PROCEDURE — 4040F PNEUMOC VAC/ADMIN/RCVD: CPT | Performed by: UROLOGY

## 2020-08-18 NOTE — ASSESSMENT & PLAN NOTE
AUA symptom score is 16  He is satisfied with his voiding pattern  Options were discussed and we will continue to follow with watchful waiting  He will obtain a urinalysis and will have a repeat PSA level done  PSA last year was satisfactory at 3 4

## 2020-08-18 NOTE — PROGRESS NOTES
Assessment/Plan:    BPH with obstruction/lower urinary tract symptoms  AUA symptom score is 16  He is satisfied with his voiding pattern  Options were discussed and we will continue to follow with watchful waiting  He will obtain a urinalysis and will have a repeat PSA level done  PSA last year was satisfactory at 3 4  Frequency of micturition  He is satisfied with his voiding pattern  He denies any incontinence  We will continue to follow  Combined arterial insufficiency and corporo-venous occlusive erectile dysfunction  The patient reports he is doing well with Cialis 20 mg  He will call when he needs a refill prescription  Diagnoses and all orders for this visit:    Frequency of micturition  -     Urinalysis with microscopic; Future    BPH with obstruction/lower urinary tract symptoms    Combined arterial insufficiency and corporo-venous occlusive erectile dysfunction          Subjective:      Patient ID: Tracy Matta is a 82 y o  male  Benign Prostatic Hypertrophy   This is a chronic problem  The current episode started more than 1 year ago  The problem is unchanged  Irritative symptoms include frequency and nocturia (Nocturia x 2)  Irritative symptoms do not include urgency  No incontinence  Obstructive symptoms include a slower stream  Obstructive symptoms do not include dribbling, incomplete emptying, an intermittent stream, straining or a weak stream  Pertinent negatives include no chills, dysuria, genital pain, hematuria, hesitancy, nausea or vomiting  AUA score is 8-19  His sexual activity is non-contributory to the current illness  The symptoms are aggravated by caffeine  Past treatments include nothing  Erectile dysfunction-Cialis is working well for erectile dysfunction  He normally takes 10 mg  His symptoms are chronic and have been stable  He is satisfied with the use of Cialis at this time      The following portions of the patient's history were reviewed and updated as appropriate: allergies, current medications, past family history, past medical history, past social history, past surgical history and problem list     Review of Systems   Constitutional: Negative for chills, diaphoresis, fatigue and fever  HENT: Negative  Eyes: Negative  Respiratory: Negative  Cardiovascular: Negative  Gastrointestinal: Negative  Negative for nausea and vomiting  Endocrine: Negative  Genitourinary: Positive for frequency and nocturia (Nocturia x 2)  Negative for dysuria, hematuria, hesitancy, incomplete emptying and urgency  See HPI   Musculoskeletal: Negative  Skin: Negative  Allergic/Immunologic: Negative  Neurological: Negative  Hematological: Negative  Psychiatric/Behavioral: Negative  AUA SYMPTOM SCORE      Most Recent Value   AUA SYMPTOM SCORE   How often have you had a sensation of not emptying your bladder completely after you finished urinating? 3   How often have you had to urinate again less than two hours after you finished urinating? 3   How often have you found you stopped and started again several times when you urinate? 3   How often have you found it difficult to postpone urination? 1   How often have you had a weak urinary stream?  3   How often have you had to push or strain to begin urination? 1   How many times did you most typically get up to urinate from the time you went to bed at night until the time you got up in the morning? 2   Quality of Life: If you were to spend the rest of your life with your urinary condition just the way it is now, how would you feel about that?  2   AUA SYMPTOM SCORE  16          Objective:      /70   Temp (!) 97 3 °F (36 3 °C)   Ht 5' 10" (1 778 m)   Wt 87 5 kg (193 lb)   BMI 27 69 kg/m²          Physical Exam  Vitals signs reviewed  Constitutional:       General: He is not in acute distress  Appearance: Normal appearance  He is well-developed     HENT:      Head: Normocephalic and atraumatic  Eyes:      General: No scleral icterus  Conjunctiva/sclera: Conjunctivae normal       Pupils: Pupils are equal, round, and reactive to light  Neck:      Musculoskeletal: Neck supple  Cardiovascular:      Rate and Rhythm: Normal rate  Pulmonary:      Effort: Pulmonary effort is normal    Abdominal:      General: Bowel sounds are normal  There is no distension  Palpations: Abdomen is soft  There is no mass  Tenderness: There is no abdominal tenderness  There is no right CVA tenderness, left CVA tenderness, guarding or rebound  Hernia: No hernia is present  Genitourinary:     Penis: Normal  No phimosis or hypospadias  Scrotum/Testes: Normal          Right: Mass not present  Left: Mass not present  Rectum: Normal       Comments: Prostate 2 5 X enlarged and palpably benign  Skin:     General: Skin is warm and dry  Neurological:      Mental Status: He is alert and oriented to person, place, and time  Psychiatric:         Behavior: Behavior normal          Thought Content:  Thought content normal          Judgment: Judgment normal

## 2020-08-19 ENCOUNTER — APPOINTMENT (OUTPATIENT)
Dept: LAB | Facility: CLINIC | Age: 85
End: 2020-08-19
Payer: MEDICARE

## 2020-08-19 DIAGNOSIS — N13.8 BPH WITH OBSTRUCTION/LOWER URINARY TRACT SYMPTOMS: ICD-10-CM

## 2020-08-19 DIAGNOSIS — N40.1 BPH WITH OBSTRUCTION/LOWER URINARY TRACT SYMPTOMS: ICD-10-CM

## 2020-08-19 DIAGNOSIS — R35.0 FREQUENCY OF MICTURITION: ICD-10-CM

## 2020-08-19 LAB
BACTERIA UR QL AUTO: NORMAL /HPF
BILIRUB UR QL STRIP: NEGATIVE
CLARITY UR: CLEAR
COLOR UR: YELLOW
GLUCOSE UR STRIP-MCNC: NEGATIVE MG/DL
HGB UR QL STRIP.AUTO: NEGATIVE
HYALINE CASTS #/AREA URNS LPF: NORMAL /LPF
KETONES UR STRIP-MCNC: NEGATIVE MG/DL
LEUKOCYTE ESTERASE UR QL STRIP: NEGATIVE
NITRITE UR QL STRIP: NEGATIVE
NON-SQ EPI CELLS URNS QL MICRO: NORMAL /HPF
PH UR STRIP.AUTO: 7 [PH]
PROT UR STRIP-MCNC: NEGATIVE MG/DL
PSA SERPL-MCNC: 3.5 NG/ML (ref 0–4)
RBC #/AREA URNS AUTO: NORMAL /HPF
SP GR UR STRIP.AUTO: 1.01 (ref 1–1.03)
UROBILINOGEN UR QL STRIP.AUTO: 0.2 E.U./DL
WBC #/AREA URNS AUTO: NORMAL /HPF

## 2020-08-19 PROCEDURE — 81001 URINALYSIS AUTO W/SCOPE: CPT

## 2020-08-19 PROCEDURE — 36415 COLL VENOUS BLD VENIPUNCTURE: CPT

## 2020-08-19 PROCEDURE — G0103 PSA SCREENING: HCPCS

## 2020-10-14 ENCOUNTER — OFFICE VISIT (OUTPATIENT)
Dept: URGENT CARE | Facility: CLINIC | Age: 85
End: 2020-10-14
Payer: MEDICARE

## 2020-10-14 VITALS
OXYGEN SATURATION: 97 % | DIASTOLIC BLOOD PRESSURE: 80 MMHG | HEART RATE: 81 BPM | WEIGHT: 190 LBS | BODY MASS INDEX: 27.2 KG/M2 | TEMPERATURE: 97.5 F | SYSTOLIC BLOOD PRESSURE: 161 MMHG | RESPIRATION RATE: 18 BRPM | HEIGHT: 70 IN

## 2020-10-14 DIAGNOSIS — L23.7 POISON IVY DERMATITIS: Primary | ICD-10-CM

## 2020-10-14 PROCEDURE — G0463 HOSPITAL OUTPT CLINIC VISIT: HCPCS | Performed by: PHYSICIAN ASSISTANT

## 2020-10-14 PROCEDURE — 99213 OFFICE O/P EST LOW 20 MIN: CPT | Performed by: PHYSICIAN ASSISTANT

## 2020-10-14 RX ORDER — BETAMETHASONE DIPROPIONATE 0.5 MG/G
CREAM TOPICAL 2 TIMES DAILY
Qty: 30 G | Refills: 0 | Status: SHIPPED | OUTPATIENT
Start: 2020-10-14

## 2021-01-23 DIAGNOSIS — Z23 ENCOUNTER FOR IMMUNIZATION: ICD-10-CM

## 2021-03-12 ENCOUNTER — OFFICE VISIT (OUTPATIENT)
Dept: INTERNAL MEDICINE CLINIC | Facility: CLINIC | Age: 86
End: 2021-03-12
Payer: MEDICARE

## 2021-03-12 VITALS
HEART RATE: 69 BPM | SYSTOLIC BLOOD PRESSURE: 146 MMHG | WEIGHT: 197 LBS | BODY MASS INDEX: 28.2 KG/M2 | OXYGEN SATURATION: 98 % | HEIGHT: 70 IN | DIASTOLIC BLOOD PRESSURE: 82 MMHG | RESPIRATION RATE: 14 BRPM | TEMPERATURE: 97.1 F

## 2021-03-12 DIAGNOSIS — I48.0 PAROXYSMAL ATRIAL FIBRILLATION (HCC): ICD-10-CM

## 2021-03-12 DIAGNOSIS — E78.5 HYPERLIPIDEMIA, UNSPECIFIED HYPERLIPIDEMIA TYPE: ICD-10-CM

## 2021-03-12 DIAGNOSIS — Z00.00 MEDICARE ANNUAL WELLNESS VISIT, SUBSEQUENT: Primary | ICD-10-CM

## 2021-03-12 DIAGNOSIS — R41.3 SHORT-TERM MEMORY LOSS: ICD-10-CM

## 2021-03-12 PROCEDURE — 99214 OFFICE O/P EST MOD 30 MIN: CPT | Performed by: INTERNAL MEDICINE

## 2021-03-12 PROCEDURE — G0438 PPPS, INITIAL VISIT: HCPCS | Performed by: INTERNAL MEDICINE

## 2021-03-12 PROCEDURE — 1123F ACP DISCUSS/DSCN MKR DOCD: CPT | Performed by: INTERNAL MEDICINE

## 2021-03-12 RX ORDER — DONEPEZIL HYDROCHLORIDE 5 MG/1
5 TABLET, FILM COATED ORAL
Qty: 30 TABLET | Refills: 5 | Status: SHIPPED | OUTPATIENT
Start: 2021-03-12 | End: 2021-09-08 | Stop reason: SDUPTHER

## 2021-03-12 RX ORDER — APIXABAN 5 MG/1
5 TABLET, FILM COATED ORAL 2 TIMES DAILY
COMMUNITY
Start: 2020-12-31

## 2021-03-12 RX ORDER — SIMVASTATIN 40 MG
40 TABLET ORAL DAILY
Qty: 90 TABLET | Refills: 3 | Status: SHIPPED | OUTPATIENT
Start: 2021-03-12 | End: 2022-03-11 | Stop reason: SDUPTHER

## 2021-03-12 NOTE — PATIENT INSTRUCTIONS

## 2021-03-12 NOTE — PROGRESS NOTES
Assessment/Plan:  Trial of Aricept and a follow up CT of the head    Problem List Items Addressed This Visit        Cardiovascular and Mediastinum    Paroxysmal atrial fibrillation (HCC)    Relevant Medications    metoprolol tartrate (LOPRESSOR) 25 mg tablet      Other Visit Diagnoses     Short-term memory loss    -  Primary    Relevant Medications    donepezil (ARICEPT) 5 mg tablet    Other Relevant Orders    CT head wo contrast    Hyperlipidemia, unspecified hyperlipidemia type        Relevant Medications    simvastatin (ZOCOR) 40 mg tablet           Diagnoses and all orders for this visit:    Short-term memory loss  -     donepezil (ARICEPT) 5 mg tablet; Take 1 tablet (5 mg total) by mouth daily at bedtime  -     CT head wo contrast; Future    Hyperlipidemia, unspecified hyperlipidemia type  -     simvastatin (ZOCOR) 40 mg tablet; Take 1 tablet (40 mg total) by mouth daily    Paroxysmal atrial fibrillation (HCC)  -     metoprolol tartrate (LOPRESSOR) 25 mg tablet; Take 1 tablet (25 mg total) by mouth every 12 (twelve) hours    Other orders  -     Eliquis 5 MG; Take 5 mg by mouth 2 (two) times a day        No problem-specific Assessment & Plan notes found for this encounter  Subjective: change in memory     Patient ID: Jose Carter is a 82 y o  male  Increasing issues with short term memory is noted by his wife and the patient states that he has no other issues at this time  The patient has problems with long term memory also with his wife noting problems with remembering who his physician is  The prior Ct of his head from last year was negative  He has no symptoms with depression or anxiety  His BP is mildly hypertensive today  He is doing well on the Eliqis          The following portions of the patient's history were reviewed and updated as appropriate:   He has a past medical history of Anemia (12/10/2012), BPH with obstruction/lower urinary tract symptoms, Cataract, Frequency of micturition, Hypercholesteremia, Memory loss, and Poor urinary stream ,  does not have any pertinent problems on file  ,   has a past surgical history that includes Hernia repair and Cataract extraction (Bilateral, 02/2019, 03/2019)  ,  family history includes Colon cancer in his family; Coronary artery disease in his mother; No Known Problems in his father  ,   reports that he has never smoked  He has never used smokeless tobacco  He reports current alcohol use of about 1 0 - 2 0 standard drinks of alcohol per week  He reports that he does not use drugs  ,  has No Known Allergies     Current Outpatient Medications   Medication Sig Dispense Refill    amLODIPine (NORVASC) 5 mg tablet 5 mg daily   0    betamethasone dipropionate (DIPROSONE) 0 05 % cream Apply topically 2 (two) times a day 30 g 0    Calcium Carbonate-Vitamin D (CALTRATE 600+D) 600-400 MG-UNIT per tablet Take by mouth      coenzyme Q-10 100 MG capsule Take 1 capsule by mouth daily        Eliquis 5 MG Take 5 mg by mouth 2 (two) times a day      Lecithin 1200 MG CAPS Take by mouth daily      metoprolol tartrate (LOPRESSOR) 25 mg tablet Take 1 tablet (25 mg total) by mouth every 12 (twelve) hours 180 tablet 1    Multiple Vitamins-Minerals (CENTRUM ADULTS PO) Take 1 tablet by mouth daily      Omega-3 1000 MG CAPS Take 1 capsule by mouth 2 (two) times a day        ramipril (ALTACE) 10 MG capsule Take by mouth daily        simvastatin (ZOCOR) 40 mg tablet Take 1 tablet (40 mg total) by mouth daily 90 tablet 3    tadalafil (CIALIS) 20 MG tablet Take 1 tablet (20 mg total) by mouth daily as needed for erectile dysfunction 24 tablet 5    donepezil (ARICEPT) 5 mg tablet Take 1 tablet (5 mg total) by mouth daily at bedtime 30 tablet 5     No current facility-administered medications for this visit  Review of Systems   Constitutional: Negative for chills, fatigue and fever  HENT: Negative      Respiratory: Negative for cough, chest tightness and shortness of breath  Cardiovascular: Negative for chest pain and palpitations  Gastrointestinal: Negative for abdominal pain, constipation, diarrhea, nausea and vomiting  Genitourinary: Negative  Musculoskeletal: Negative for back pain and myalgias  Skin: Negative  Neurological: Negative  Psychiatric/Behavioral: Negative for dysphoric mood  The patient is not nervous/anxious  Objective:  Vitals:    03/12/21 1438   BP: 146/82   BP Location: Left arm   Patient Position: Sitting   Cuff Size: Large   Pulse: 69   Resp: 14   Temp: (!) 97 1 °F (36 2 °C)   SpO2: 98%   Weight: 89 4 kg (197 lb)   Height: 5' 10" (1 778 m)     Body mass index is 28 27 kg/m²  Physical Exam  Vitals signs and nursing note reviewed  Constitutional:       Appearance: He is well-developed  HENT:      Head: Normocephalic and atraumatic  Eyes:      Pupils: Pupils are equal, round, and reactive to light  Neck:      Musculoskeletal: Normal range of motion and neck supple  Cardiovascular:      Rate and Rhythm: Normal rate and regular rhythm  Heart sounds: Normal heart sounds  No murmur  Pulmonary:      Effort: Pulmonary effort is normal       Breath sounds: Normal breath sounds  No stridor  No rales  Abdominal:      General: Bowel sounds are normal  There is no distension  Palpations: Abdomen is soft  Tenderness: There is no abdominal tenderness  Musculoskeletal: Normal range of motion  General: No deformity  Skin:     General: Skin is warm and dry  Neurological:      Mental Status: He is alert and oriented to person, place, and time            PHQ-9 Depression Screening    PHQ-9:   Frequency of the following problems over the past two weeks:      Little interest or pleasure in doing things: 0 - not at all  Feeling down, depressed, or hopeless: 0 - not at all  PHQ-2 Score: 0

## 2021-03-12 NOTE — PROGRESS NOTES
Assessment and Plan:     Problem List Items Addressed This Visit     None           Preventive health issues were discussed with patient, and age appropriate screening tests were ordered as noted in patient's After Visit Summary  Personalized health advice and appropriate referrals for health education or preventive services given if needed, as noted in patient's After Visit Summary       History of Present Illness:     Patient presents for Medicare Annual Wellness visit    Patient Care Team:  Cara Corrales DO as PCP - General (Internal Medicine)  Marion Tran MD (Cardiology)  Michael Loaiza MD (Urology)     Problem List:     Patient Active Problem List   Diagnosis    Benign colon polyp    Benign essential hypertension    D-dimer, elevated    Combined arterial insufficiency and corporo-venous occlusive erectile dysfunction    Pure hypercholesterolemia    Paroxysmal atrial fibrillation (HCC)    Diverticulosis    Acquired trigger finger    Low back pain    BPH with obstruction/lower urinary tract symptoms    Pre-operative clearance    Excessive sweating    Frequency of micturition      Past Medical and Surgical History:     Past Medical History:   Diagnosis Date    Anemia 12/10/2012    BPH with obstruction/lower urinary tract symptoms     Cataract     Frequency of micturition     Hypercholesteremia     Memory loss     Poor urinary stream      Past Surgical History:   Procedure Laterality Date    CATARACT EXTRACTION Bilateral 02/2019, 03/2019    HERNIA REPAIR        Family History:     Family History   Problem Relation Age of Onset    Coronary artery disease Mother     No Known Problems Father     Colon cancer Family       Social History:     E-Cigarette/Vaping    E-Cigarette Use Never User      E-Cigarette/Vaping Substances    Nicotine No     THC No     CBD No     Flavoring No     Other No     Unknown No      Social History     Socioeconomic History    Marital status: /Civil [employer]     Spouse name: None    Number of children: None    Years of education: None    Highest education level: None   Occupational History    Occupation: RETIRED   Social Needs    Financial resource strain: None    Food insecurity     Worry: None     Inability: None    Transportation needs     Medical: None     Non-medical: None   Tobacco Use    Smoking status: Never Smoker    Smokeless tobacco: Never Used   Substance and Sexual Activity    Alcohol use:  Yes     Alcohol/week: 1 0 - 2 0 standard drinks     Types: 1 - 2 Glasses of wine per week     Comment: weekly    Drug use: No    Sexual activity: None   Lifestyle    Physical activity     Days per week: None     Minutes per session: None    Stress: None   Relationships    Social connections     Talks on phone: None     Gets together: None     Attends Yazdanism service: None     Active member of club or organization: None     Attends meetings of clubs or organizations: None     Relationship status: None    Intimate partner violence     Fear of current or ex partner: None     Emotionally abused: None     Physically abused: None     Forced sexual activity: None   Other Topics Concern    None   Social History Narrative        RETIRED      Medications and Allergies:     Current Outpatient Medications   Medication Sig Dispense Refill    amLODIPine (NORVASC) 5 mg tablet 5 mg daily   0    betamethasone dipropionate (DIPROSONE) 0 05 % cream Apply topically 2 (two) times a day 30 g 0    Calcium Carbonate-Vitamin D (CALTRATE 600+D) 600-400 MG-UNIT per tablet Take by mouth      coenzyme Q-10 100 MG capsule Take 1 capsule by mouth daily        Eliquis 5 MG Take 5 mg by mouth 2 (two) times a day      Lecithin 1200 MG CAPS Take by mouth daily      metoprolol tartrate (LOPRESSOR) 25 mg tablet Take 1 tablet (25 mg total) by mouth every 12 (twelve) hours 180 tablet 1    Multiple Vitamins-Minerals (CENTRUM ADULTS PO) Take 1 tablet by mouth daily      Omega-3 1000 MG CAPS Take 1 capsule by mouth 2 (two) times a day        ramipril (ALTACE) 10 MG capsule Take by mouth daily        simvastatin (ZOCOR) 40 mg tablet Take 1 tablet (40 mg total) by mouth daily 90 tablet 3    tadalafil (CIALIS) 20 MG tablet Take 1 tablet (20 mg total) by mouth daily as needed for erectile dysfunction 24 tablet 5     No current facility-administered medications for this visit  No Known Allergies   Immunizations:     Immunization History   Administered Date(s) Administered    INFLUENZA 09/30/2015, 09/07/2018, 10/15/2018, 10/03/2019, 09/23/2020    Influenza Quadrivalent Preservative Free 3 years and older IM 09/30/2015    Influenza Split High Dose Preservative Free IM 10/05/2016, 10/09/2017    Influenza, high dose seasonal 0 7 mL 09/23/2020    Influenza, seasonal, injectable 10/03/2012, 10/02/2013    Pneumococcal Conjugate 13-Valent 03/21/2016, 04/05/2017    SARS-CoV-2 / COVID-19 mRNA IM (Moderna) 01/20/2021, 02/17/2021    Tdap 03/02/2015    Zoster Vaccine Recombinant 10/16/2018    influenza, trivalent, adjuvanted 09/30/2019      Health Maintenance: There are no preventive care reminders to display for this patient  Topic Date Due    Pneumococcal Vaccine: 65+ Years (2 of 2 - PPSV23) 04/05/2018      Medicare Health Risk Assessment:     /82 (BP Location: Left arm, Patient Position: Sitting, Cuff Size: Large)   Pulse 69   Temp (!) 97 1 °F (36 2 °C)   Resp 14   Ht 5' 10" (1 778 m)   Wt 89 4 kg (197 lb)   SpO2 98%   BMI 28 27 kg/m²      Sherene Hodgkin is here for his Subsequent Wellness visit  Last Medicare Wellness visit information reviewed, patient interviewed and updates made to the record today  Health Risk Assessment:   Patient rates overall health as good  Patient feels that their physical health rating is same  Patient is satisfied with their life  Eyesight was rated as same  Hearing was rated as same   Patient feels that their emotional and mental health rating is same  Patients states they are never, rarely angry  Patient states they are never, rarely unusually tired/fatigued  Pain experienced in the last 7 days has been none  Patient states that he has experienced no weight loss or gain in last 6 months  Depression Screening:   PHQ-2 Score: 0      Fall Risk Screening: In the past year, patient has experienced: no history of falling in past year      Home Safety:  Patient does not have trouble with stairs inside or outside of their home  Patient has working smoke alarms and has working carbon monoxide detector  Home safety hazards include: none  Nutrition:   Current diet is Regular  Medications:   Patient is currently taking over-the-counter supplements  OTC medications include: see medication list  Patient is not able to manage medications  Spouse helps and over sees    Activities of Daily Living (ADLs)/Instrumental Activities of Daily Living (IADLs):   Walk and transfer into and out of bed and chair?: Yes  Dress and groom yourself?: Yes    Bathe or shower yourself?: Yes    Feed yourself? Yes  Do your laundry/housekeeping?: Yes  Manage your money, pay your bills and track your expenses?: Yes  Make your own meals?: Yes    Do your own shopping?: Yes    Previous Hospitalizations:   Any hospitalizations or ED visits within the last 12 months?: No      Advance Care Planning:   Living will: Yes    Durable POA for healthcare:  Yes    Advanced directive: Yes    Advanced directive counseling given: No    Five wishes given: No    Patient declined ACP directive: No    End of Life Decisions reviewed with patient: Yes    Provider agrees with end of life decisions: Yes      Cognitive Screening:   Provider or family/friend/caregiver concerned regarding cognition?: No    PREVENTIVE SCREENINGS      Cardiovascular Screening:    General: Screening Not Indicated and History Lipid Disorder      Diabetes Screening:     General: Screening Current Colorectal Cancer Screening:     General: Screening Not Indicated and Patient Declines      Prostate Cancer Screening:    General: Screening Not Indicated      Osteoporosis Screening:    General: Screening Not Indicated      Abdominal Aortic Aneurysm (AAA) Screening:        General: Screening Not Indicated      Lung Cancer Screening:     General: Screening Not Indicated      Hepatitis C Screening:    General: Screening Not Indicated    Screening, Brief Intervention, and Referral to Treatment (SBIRT)    Screening  Typical number of drinks in a day: 0  Typical number of drinks in a week: 2  Interpretation: Low risk drinking behavior      Single Item Drug Screening:  How often have you used an illegal drug (including marijuana) or a prescription medication for non-medical reasons in the past year? never    Single Item Drug Screen Score: 0  Interpretation: Negative screen for possible drug use disorder      Estuardo Hemphill,

## 2021-03-18 ENCOUNTER — HOSPITAL ENCOUNTER (OUTPATIENT)
Dept: CT IMAGING | Facility: HOSPITAL | Age: 86
Discharge: HOME | End: 2021-03-18
Payer: MEDICARE

## 2021-03-18 DIAGNOSIS — R41.3 SHORT-TERM MEMORY LOSS: ICD-10-CM

## 2021-03-18 PROCEDURE — 70450 CT HEAD/BRAIN W/O DYE: CPT

## 2021-03-18 PROCEDURE — G1004 CDSM NDSC: HCPCS

## 2021-05-17 ENCOUNTER — OFFICE VISIT (OUTPATIENT)
Dept: URGENT CARE | Facility: CLINIC | Age: 86
End: 2021-05-17
Payer: MEDICARE

## 2021-05-17 ENCOUNTER — TELEPHONE (OUTPATIENT)
Dept: INTERNAL MEDICINE CLINIC | Facility: CLINIC | Age: 86
End: 2021-05-17

## 2021-05-17 VITALS
TEMPERATURE: 97.8 F | BODY MASS INDEX: 28.27 KG/M2 | OXYGEN SATURATION: 97 % | DIASTOLIC BLOOD PRESSURE: 83 MMHG | SYSTOLIC BLOOD PRESSURE: 135 MMHG | HEART RATE: 105 BPM | RESPIRATION RATE: 20 BRPM | WEIGHT: 197 LBS
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
## 2021-05-17 DIAGNOSIS — S40.861A TICK BITE OF AXILLARY REGION, RIGHT, INITIAL ENCOUNTER: Primary | ICD-10-CM

## 2021-05-17 PROCEDURE — 99213 OFFICE O/P EST LOW 20 MIN: CPT | Performed by: PHYSICIAN ASSISTANT

## 2021-05-17 PROCEDURE — G0463 HOSPITAL OUTPT CLINIC VISIT: HCPCS | Performed by: PHYSICIAN ASSISTANT

## 2021-05-17 NOTE — PROGRESS NOTES
[address]           NAME: Jonah Chung is a 82 y o  male  : 1935    MRN: [id number]  DATE: May 17, 2021  TIME: 4:48 PM    Assessment and Plan   Tick bite of axillary region, right, initial encounter [S40 861A, W57  XXXA]  1  Tick bite of axillary region, right, initial encounter         Patient Instructions   To watch for signs of infection such as increased redness, warmth or discharge  If these develop to follow up with PCP for wound check  To watch for signs of Lyme disease such as headache, myalgias, arthralgias, fever, anorexia and bulls eye rash  If any of these symptoms to follow up with PCP sooner  Otherwise follow up with PCP in 4-6 weeks for lyme titers  To present to the ER if symptoms worsen  Chief Complaint     Chief Complaint   Patient presents with    Tick Removal     tick bite under right arm         History of Present Illness   Jonah Chung presents to the clinic c/o    Insect Bite  This is a new problem  The current episode started in the past 7 days  The problem occurs constantly  The problem has been unchanged  Pertinent negatives include no abdominal pain, chest pain, chills, congestion, coughing, diaphoresis, fatigue, fever, headaches or rash  Nothing aggravates the symptoms  He has tried nothing for the symptoms  The treatment provided no relief  Review of Systems   Review of Systems   Constitutional: Negative for chills, diaphoresis, fatigue and fever  HENT: Negative for congestion, ear discharge, ear pain and facial swelling  Eyes: Negative for photophobia, pain, discharge, redness, itching and visual disturbance  Respiratory: Negative for apnea, cough, chest tightness, shortness of breath and wheezing  Cardiovascular: Negative for chest pain and palpitations  Gastrointestinal: Negative for abdominal pain  Skin: Positive for wound (tick bite right armpit)  Negative for color change and rash     Neurological: Negative for dizziness and headaches  Hematological: Negative for adenopathy           Current Medications     Long-Term Medications   Medication Sig Dispense Refill    amLODIPine (NORVASC) 5 mg tablet 5 mg daily   0    betamethasone dipropionate (DIPROSONE) 0 05 % cream Apply topically 2 (two) times a day 30 g 0    Calcium Carbonate-Vitamin D (CALTRATE 600+D) 600-400 MG-UNIT per tablet Take by mouth      donepezil (ARICEPT) 5 mg tablet Take 1 tablet (5 mg total) by mouth daily at bedtime 30 tablet 5    Eliquis 5 MG Take 5 mg by mouth 2 (two) times a day      Lecithin 1200 MG CAPS Take by mouth daily      metoprolol tartrate (LOPRESSOR) 25 mg tablet Take 1 tablet (25 mg total) by mouth every 12 (twelve) hours 180 tablet 1    Omega-3 1000 MG CAPS Take 1 capsule by mouth 2 (two) times a day        ramipril (ALTACE) 10 MG capsule Take by mouth daily        simvastatin (ZOCOR) 40 mg tablet Take 1 tablet (40 mg total) by mouth daily 90 tablet 3    tadalafil (CIALIS) 20 MG tablet Take 1 tablet (20 mg total) by mouth daily as needed for erectile dysfunction 24 tablet 5       Current Allergies     Allergies as of 05/17/2021    (No Known Allergies)            The following portions of the patient's history were reviewed and updated as appropriate: allergies, current medications, past family history, past medical history, past social history, past surgical history and problem list   Past Medical History:   Diagnosis Date    Anemia 12/10/2012    BPH with obstruction/lower urinary tract symptoms     Cataract     Frequency of micturition     Hypercholesteremia     Memory loss     Poor urinary stream      Past Surgical History:   Procedure Laterality Date    CATARACT EXTRACTION Bilateral 02/2019, 03/2019    HERNIA REPAIR       Social History     Socioeconomic History    Marital status: /Civil Union     Spouse name: Not on file    Number of children: Not on file    Years of education: Not on file    Highest education level: Not on file   Occupational History    Occupation: RETIRED   Social Needs    Financial resource strain: Not on file    Food insecurity     Worry: Not on file     Inability: Not on file   [employer] needs     Medical: Not on file     Non-medical: Not on file   Tobacco Use    Smoking status: Never Smoker    Smokeless tobacco: Never Used   Substance and Sexual Activity    Alcohol use: Yes     Alcohol/week: 1 0 - 2 0 standard drinks     Types: 1 - 2 Glasses of wine per week     Comment: weekly    Drug use: No    Sexual activity: Not on file   Lifestyle    Physical activity     Days per week: Not on file     Minutes per session: Not on file    Stress: Not on file   Relationships    Social connections     Talks on phone: Not on file     Gets together: Not on file     Attends Worship service: Not on file     Active member of club or organization: Not on file     Attends meetings of clubs or organizations: Not on file     Relationship status: Not on file    Intimate partner violence     Fear of current or ex partner: Not on file     Emotionally abused: Not on file     Physically abused: Not on file     Forced sexual activity: Not on file   Other Topics Concern    Not on file   Social History Narrative        RETIRED       Objective   /83   Pulse 105   Temp 97 8 °F (36 6 °C)   Resp 20   Wt 89 4 kg (197 lb)   SpO2 97%   BMI 28 27 kg/m²      Physical Exam     Physical Exam  Vitals signs and nursing note reviewed  Constitutional:       General: He is not in acute distress  Appearance: He is well-developed  He is not diaphoretic  HENT:      Head: Normocephalic and atraumatic  Right Ear: External ear normal       Left Ear: External ear normal       Nose: Nose normal    Eyes:      General: No scleral icterus  Right eye: No discharge  Left eye: No discharge        Conjunctiva/sclera: Conjunctivae normal    Cardiovascular:      Rate and Rhythm: Normal rate and regular rhythm  Heart sounds: Normal heart sounds  No murmur  No friction rub  No gallop  Pulmonary:      Effort: Pulmonary effort is normal  No respiratory distress  Breath sounds: Normal breath sounds  No decreased breath sounds, wheezing, rhonchi or rales  Musculoskeletal:        Arms:    Skin:     General: Skin is warm and dry  Coloration: Skin is not pale  Findings: No erythema or rash  Neurological:      Mental Status: He is alert and oriented to person, place, and time  Psychiatric:         Behavior: Behavior normal          Thought Content:  Thought content normal          Judgment: Judgment normal          Erwin Faye PA-C

## 2021-05-17 NOTE — PATIENT INSTRUCTIONS
To watch for signs of infection such as increased redness, warmth or discharge  If these develop to follow up with PCP for wound check  To watch for signs of Lyme disease such as headache, myalgias, arthralgias, fever, anorexia and bulls eye rash  If any of these symptoms to follow up with PCP sooner  Otherwise follow up with PCP in 4-6 weeks for lyme titers

## 2021-08-03 ENCOUNTER — APPOINTMENT (OUTPATIENT)
Dept: LAB | Facility: CLINIC | Age: 86
End: 2021-08-03
Payer: MEDICARE

## 2021-08-03 DIAGNOSIS — N40.1 BPH WITH OBSTRUCTION/LOWER URINARY TRACT SYMPTOMS: ICD-10-CM

## 2021-08-03 DIAGNOSIS — N13.8 BPH WITH OBSTRUCTION/LOWER URINARY TRACT SYMPTOMS: ICD-10-CM

## 2021-08-03 LAB — PSA SERPL-MCNC: 6.4 NG/ML (ref 0–4)

## 2021-08-03 PROCEDURE — 84153 ASSAY OF PSA TOTAL: CPT

## 2021-08-03 PROCEDURE — 36415 COLL VENOUS BLD VENIPUNCTURE: CPT

## 2021-08-26 ENCOUNTER — OFFICE VISIT (OUTPATIENT)
Dept: UROLOGY | Facility: MEDICAL CENTER | Age: 86
End: 2021-08-26
Payer: MEDICARE

## 2021-08-26 VITALS
BODY MASS INDEX: 27.49 KG/M2 | WEIGHT: 192 LBS | SYSTOLIC BLOOD PRESSURE: 120 MMHG | DIASTOLIC BLOOD PRESSURE: 80 MMHG | HEIGHT: 70 IN

## 2021-08-26 DIAGNOSIS — N13.8 BPH WITH OBSTRUCTION/LOWER URINARY TRACT SYMPTOMS: Primary | ICD-10-CM

## 2021-08-26 DIAGNOSIS — R97.20 ELEVATED PSA: ICD-10-CM

## 2021-08-26 DIAGNOSIS — N40.1 BPH WITH OBSTRUCTION/LOWER URINARY TRACT SYMPTOMS: Primary | ICD-10-CM

## 2021-08-26 DIAGNOSIS — N52.9 ERECTILE DYSFUNCTION, UNSPECIFIED ERECTILE DYSFUNCTION TYPE: ICD-10-CM

## 2021-08-26 LAB
POST-VOID RESIDUAL VOLUME, ML POC: 0 ML
SL AMB  POCT GLUCOSE, UA: NEGATIVE
SL AMB LEUKOCYTE ESTERASE,UA: NEGATIVE
SL AMB POCT BILIRUBIN,UA: NEGATIVE
SL AMB POCT BLOOD,UA: NEGATIVE
SL AMB POCT CLARITY,UA: CLEAR
SL AMB POCT COLOR,UA: YELLOW
SL AMB POCT KETONES,UA: NORMAL
SL AMB POCT NITRITE,UA: NEGATIVE
SL AMB POCT PH,UA: 7
SL AMB POCT SPECIFIC GRAVITY,UA: 1.01
SL AMB POCT URINE PROTEIN: NEGATIVE
SL AMB POCT UROBILINOGEN: 0.2

## 2021-08-26 PROCEDURE — 51798 US URINE CAPACITY MEASURE: CPT | Performed by: NURSE PRACTITIONER

## 2021-08-26 PROCEDURE — 99213 OFFICE O/P EST LOW 20 MIN: CPT | Performed by: NURSE PRACTITIONER

## 2021-08-26 PROCEDURE — 81002 URINALYSIS NONAUTO W/O SCOPE: CPT | Performed by: NURSE PRACTITIONER

## 2021-08-26 RX ORDER — TADALAFIL 20 MG/1
20 TABLET ORAL DAILY PRN
Qty: 30 TABLET | Refills: 3 | Status: SHIPPED | OUTPATIENT
Start: 2021-08-26

## 2021-08-26 NOTE — PROGRESS NOTES
8/26/2021      Chief Complaint   Patient presents with    Benign Prostatic Hypertrophy    Urinary Frequency     Assessment and Plan    82 y o  male managed by Dr Raimundo Cantu    1  Benign prostatic hyperplasia with lower urinary tract symptoms  ·  bladder scan PVR 0 mL  ·  PSA performed 08/03/2021 resulted 6 4  ·  RAQUEL-  Prostate size of approximate 40-45 g with no nodules noted  ·  repeat PSA in 3 months  ·  follow up the office in 3 months    2  Erectile dysfunction  ·  continue tadalafil- prescription refill not needed at this time    History of Present Illness  Ahser Ramos is a 82 y o  male here for follow up evaluation of urinary symptoms secondary to benign prostatic hyperplasia  Patient also has a history of erectile dysfunction for which he has been managed with tadalafil with good effects and minimal side effects  Patient does have a most recent PSA performed 08/03/2021 resulting 6 4  PSA trend below  Patient currently denies all lower urinary tract symptoms  He is currently satisfied with his urinary symptoms  He reports sensation of complete bladder emptying with urination  He denies changes to his general health since prior office evaluation  Component       PSA, Total   Latest Ref Rng & Units       0 0 - 4 0 ng/mL   3/22/2017      9:46 AM 3 1   2/19/2018      8:11 AM 3 2   4/3/2019      8:13 AM 4 8 (H)   5/10/2019      2:11 PM 3 4   8/19/2020      8:52 AM 3 5   8/3/2021      9:56 AM 6 4 (H)       Review of Systems   Constitutional: Negative for chills and fever  Respiratory: Negative for cough and shortness of breath  Cardiovascular: Negative for chest pain  Gastrointestinal: Negative for abdominal distention, abdominal pain, blood in stool, nausea and vomiting  Genitourinary: Negative for difficulty urinating, dysuria, enuresis, flank pain, frequency, hematuria and urgency  Musculoskeletal: Negative for back pain  Skin: Negative for rash  Neurological: Negative for dizziness  Past Medical History  Past Medical History:   Diagnosis Date    Anemia 12/10/2012    BPH with obstruction/lower urinary tract symptoms     Cataract     Frequency of micturition     Hypercholesteremia     Memory loss     Poor urinary stream        Past Social History  Past Surgical History:   Procedure Laterality Date    CATARACT EXTRACTION Bilateral 02/2019, 03/2019    HERNIA REPAIR       Social History     Tobacco Use   Smoking Status Never Smoker   Smokeless Tobacco Never Used       Past Family History  Family History   Problem Relation Age of Onset    Coronary artery disease Mother     No Known Problems Father     Colon cancer Family        Past Social history  Social History     Socioeconomic History    Marital status: /Civil Union     Spouse name: Not on file    Number of children: Not on file    Years of education: Not on file    Highest education level: Not on file   Occupational History    Occupation: RETIRED   Tobacco Use    Smoking status: Never Smoker    Smokeless tobacco: Never Used   Vaping Use    Vaping Use: Never used   Substance and Sexual Activity    Alcohol use: Yes     Alcohol/week: 1 0 - 2 0 standard drinks     Types: 1 - 2 Glasses of wine per week     Comment: weekly    Drug use: No    Sexual activity: Not on file   Other Topics Concern    Not on file   Social History Narrative        RETIRED     Social Determinants of Health     Financial Resource Strain:     Difficulty of Paying Living Expenses:    Food Insecurity:     Worried About Running Out of Food in the Last Year:     Ran Out of Food in the Last Year:    Transportation Needs:     Lack of Transportation (Medical):      Lack of Transportation (Non-Medical):    Physical Activity:     Days of Exercise per Week:     Minutes of Exercise per Session:    Stress:     Feeling of Stress :    Social Connections:     Frequency of Communication with Friends and Family:     Frequency of Social Gatherings with Friends and Family:     Attends Gnosticist Services:     Active Member of Clubs or Organizations:     Attends Club or Organization Meetings:     Marital Status:    Intimate Partner Violence:     Fear of Current or Ex-Partner:     Emotionally Abused:     Physically Abused:     Sexually Abused:        Current Medications  Current Outpatient Medications   Medication Sig Dispense Refill    amLODIPine (NORVASC) 5 mg tablet 5 mg daily   0    Calcium Carbonate-Vitamin D (CALTRATE 600+D) 600-400 MG-UNIT per tablet Take by mouth      coenzyme Q-10 100 MG capsule Take 1 capsule by mouth daily        donepezil (ARICEPT) 5 mg tablet Take 1 tablet (5 mg total) by mouth daily at bedtime 30 tablet 5    Eliquis 5 MG Take 5 mg by mouth 2 (two) times a day      Lecithin 1200 MG CAPS Take by mouth daily      metoprolol tartrate (LOPRESSOR) 25 mg tablet Take 1 tablet (25 mg total) by mouth every 12 (twelve) hours 180 tablet 1    Multiple Vitamins-Minerals (CENTRUM ADULTS PO) Take 1 tablet by mouth daily      Omega-3 1000 MG CAPS Take 1 capsule by mouth 2 (two) times a day        ramipril (ALTACE) 10 MG capsule Take by mouth daily        simvastatin (ZOCOR) 40 mg tablet Take 1 tablet (40 mg total) by mouth daily 90 tablet 3    tadalafil (CIALIS) 20 MG tablet Take 1 tablet (20 mg total) by mouth daily as needed for erectile dysfunction 30 tablet 3    betamethasone dipropionate (DIPROSONE) 0 05 % cream Apply topically 2 (two) times a day (Patient not taking: Reported on 8/26/2021) 30 g 0     No current facility-administered medications for this visit         Allergies  No Known Allergies      The following portions of the patient's history were reviewed and updated as appropriate: allergies, current medications, past medical history, past social history, past surgical history and problem list       Vitals  Vitals:    08/26/21 1025   BP: 120/80   BP Location: Left arm   Patient Position: Sitting   Cuff Size: Adult   Weight: 87 1 kg (192 lb)   Height: 5' 10" (1 778 m)     Physical Exam  Physical Exam  Vitals reviewed  Constitutional:       General: He is not in acute distress  Appearance: Normal appearance  He is normal weight  HENT:      Head: Normocephalic  Pulmonary:      Effort: No respiratory distress  Breath sounds: Normal breath sounds  Genitourinary:     Comments:   RAQUEL- 40-45 g with no nodules  Skin:     General: Skin is warm and dry  Neurological:      General: No focal deficit present  Mental Status: He is alert and oriented to person, place, and time     Psychiatric:         Mood and Affect: Mood normal          Behavior: Behavior normal        Results  Recent Results (from the past 1 hour(s))   POCT Measure PVR    Collection Time: 08/26/21 10:34 AM   Result Value Ref Range    POST-VOID RESIDUAL VOLUME, ML POC 0 mL   POCT urine dip    Collection Time: 08/26/21 10:37 AM   Result Value Ref Range    LEUKOCYTE ESTERASE,UA negative     NITRITE,UA negative     SL AMB POCT UROBILINOGEN 0 2     POCT URINE PROTEIN negative      PH,UA 7 0     BLOOD,UA negative     SPECIFIC GRAVITY,UA 1 015     KETONES,UA -     BILIRUBIN,UA negative     GLUCOSE, UA negative      COLOR,UA yellow     CLARITY,UA clear    ]  Lab Results   Component Value Date    PSA 6 4 (H) 08/03/2021    PSA 3 5 08/19/2020    PSA 3 4 05/10/2019     Lab Results   Component Value Date    GLUCOSE 88 03/06/2015    CALCIUM 10 1 07/20/2020     03/06/2015    K 3 9 07/20/2020    CO2 27 07/20/2020     07/20/2020    BUN 14 07/20/2020    CREATININE 0 92 07/20/2020     Lab Results   Component Value Date    WBC 5 09 07/20/2020    HGB 15 0 07/20/2020    HCT 44 4 07/20/2020    MCV 96 07/20/2020     07/20/2020     Orders  Orders Placed This Encounter   Procedures    PSA Total, Diagnostic     Standing Status:   Future     Standing Expiration Date:   8/26/2022    POCT Measure PVR    POCT urine dip       VARGAS Guerra

## 2021-09-08 DIAGNOSIS — R41.3 SHORT-TERM MEMORY LOSS: ICD-10-CM

## 2021-09-08 RX ORDER — DONEPEZIL HYDROCHLORIDE 5 MG/1
5 TABLET, FILM COATED ORAL
Qty: 30 TABLET | Refills: 5 | Status: SHIPPED | OUTPATIENT
Start: 2021-09-08 | End: 2021-09-09

## 2021-09-09 DIAGNOSIS — R41.3 SHORT-TERM MEMORY LOSS: ICD-10-CM

## 2021-09-09 RX ORDER — DONEPEZIL HYDROCHLORIDE 5 MG/1
TABLET, FILM COATED ORAL
Qty: 30 TABLET | Refills: 5 | Status: SHIPPED | OUTPATIENT
Start: 2021-09-09 | End: 2022-02-28 | Stop reason: SDUPTHER

## 2021-09-15 ENCOUNTER — OFFICE VISIT (OUTPATIENT)
Dept: INTERNAL MEDICINE CLINIC | Facility: CLINIC | Age: 86
End: 2021-09-15
Payer: MEDICARE

## 2021-09-15 VITALS
BODY MASS INDEX: 27.27 KG/M2 | OXYGEN SATURATION: 98 % | SYSTOLIC BLOOD PRESSURE: 120 MMHG | HEART RATE: 78 BPM | HEIGHT: 70 IN | TEMPERATURE: 98.5 F | WEIGHT: 190.5 LBS | DIASTOLIC BLOOD PRESSURE: 80 MMHG

## 2021-09-15 DIAGNOSIS — E78.00 PURE HYPERCHOLESTEROLEMIA: ICD-10-CM

## 2021-09-15 DIAGNOSIS — Z23 NEED FOR VACCINATION: ICD-10-CM

## 2021-09-15 DIAGNOSIS — I48.0 PAROXYSMAL ATRIAL FIBRILLATION (HCC): ICD-10-CM

## 2021-09-15 DIAGNOSIS — R47.01 APHASIA: Primary | ICD-10-CM

## 2021-09-15 DIAGNOSIS — I10 BENIGN ESSENTIAL HYPERTENSION: ICD-10-CM

## 2021-09-15 PROCEDURE — G0008 ADMIN INFLUENZA VIRUS VAC: HCPCS | Performed by: INTERNAL MEDICINE

## 2021-09-15 PROCEDURE — 99214 OFFICE O/P EST MOD 30 MIN: CPT | Performed by: INTERNAL MEDICINE

## 2021-09-15 PROCEDURE — 90662 IIV NO PRSV INCREASED AG IM: CPT | Performed by: INTERNAL MEDICINE

## 2021-09-15 NOTE — PROGRESS NOTES
Assessment/Plan:    Problem List Items Addressed This Visit        Cardiovascular and Mediastinum    Benign essential hypertension    Paroxysmal atrial fibrillation (HCC)       Other    Pure hypercholesterolemia      Other Visit Diagnoses     Aphasia    -  Primary    Relevant Orders    CT head wo contrast           Diagnoses and all orders for this visit:    Aphasia  -     CT head wo contrast; Future    Paroxysmal atrial fibrillation (HCC)    Pure hypercholesterolemia    Benign essential hypertension        No problem-specific Assessment & Plan notes found for this encounter  Subjective: The patient was seen and examined and noted to have issues with pain in the lumbar area  Patient ID: Carolynn Cavanaugh is a 82 y o  male  The patient was seen and examined and noted to have issues with slurred speech  He has a history of A-fib with Eliquis started  The patient dementia is stable with the current small dose of Donepezil  His mood is good with the Citalopram   His BP is well contolled with the Ramipril and Amlodipine  The patient is noted to have done well with the cholesterol medications  The patient is noted to have       The following portions of the patient's history were reviewed and updated as appropriate:   He has a past medical history of Anemia (12/10/2012), BPH with obstruction/lower urinary tract symptoms, Cataract, Frequency of micturition, Hypercholesteremia, Memory loss, and Poor urinary stream ,  does not have any pertinent problems on file  ,   has a past surgical history that includes Hernia repair and Cataract extraction (Bilateral, 02/2019, 03/2019)  ,  family history includes Colon cancer in his family; Coronary artery disease in his mother; No Known Problems in his father  ,   reports that he has never smoked  He has never used smokeless tobacco  He reports current alcohol use of about 1 0 - 2 0 standard drinks of alcohol per week  He reports that he does not use drugs  ,  has No Known Allergies     Current Outpatient Medications   Medication Sig Dispense Refill    amLODIPine (NORVASC) 5 mg tablet 5 mg daily   0    Calcium Carbonate-Vitamin D (CALTRATE 600+D) 600-400 MG-UNIT per tablet Take by mouth      coenzyme Q-10 100 MG capsule Take 1 capsule by mouth daily        donepezil (ARICEPT) 5 mg tablet take 1 tablet by mouth once daily at bedtime 30 tablet 5    Eliquis 5 MG Take 5 mg by mouth 2 (two) times a day      Lecithin 1200 MG CAPS Take by mouth daily      Multiple Vitamins-Minerals (CENTRUM ADULTS PO) Take 1 tablet by mouth daily      Omega-3 1000 MG CAPS Take 1 capsule by mouth 2 (two) times a day        ramipril (ALTACE) 10 MG capsule Take by mouth daily        simvastatin (ZOCOR) 40 mg tablet Take 1 tablet (40 mg total) by mouth daily 90 tablet 3    tadalafil (CIALIS) 20 MG tablet Take 1 tablet (20 mg total) by mouth daily as needed for erectile dysfunction 30 tablet 3    betamethasone dipropionate (DIPROSONE) 0 05 % cream Apply topically 2 (two) times a day (Patient not taking: Reported on 9/15/2021) 30 g 0    metoprolol tartrate (LOPRESSOR) 25 mg tablet Take 1 tablet (25 mg total) by mouth every 12 (twelve) hours 180 tablet 1     No current facility-administered medications for this visit  Review of Systems   Constitutional: Negative for chills, fatigue and fever  HENT: Negative  Respiratory: Negative for cough, chest tightness and shortness of breath  Cardiovascular: Negative for chest pain and palpitations  Gastrointestinal: Negative for abdominal pain, constipation, diarrhea, nausea and vomiting  Genitourinary: Negative  Musculoskeletal: Negative for back pain and myalgias  Skin: Negative  Neurological: Positive for speech difficulty  Psychiatric/Behavioral: Negative for dysphoric mood  The patient is not nervous/anxious            Objective:  Vitals:    09/15/21 0848   BP: 120/80   Pulse: 78   Temp: 98 5 °F (36 9 °C)   SpO2: 98%   Weight:

## 2021-09-17 ENCOUNTER — HOSPITAL ENCOUNTER (OUTPATIENT)
Dept: CT IMAGING | Facility: HOSPITAL | Age: 86
Discharge: HOME | End: 2021-09-17
Payer: MEDICARE

## 2021-09-17 DIAGNOSIS — R47.01 APHASIA: ICD-10-CM

## 2021-09-17 PROCEDURE — 70450 CT HEAD/BRAIN W/O DYE: CPT

## 2021-09-17 PROCEDURE — G1004 CDSM NDSC: HCPCS

## 2021-09-24 ENCOUNTER — TELEPHONE (OUTPATIENT)
Dept: INTERNAL MEDICINE CLINIC | Facility: CLINIC | Age: 86
End: 2021-09-24

## 2021-09-24 DIAGNOSIS — I48.0 PAROXYSMAL ATRIAL FIBRILLATION (HCC): ICD-10-CM

## 2021-09-27 ENCOUNTER — OFFICE VISIT (OUTPATIENT)
Dept: INTERNAL MEDICINE CLINIC | Facility: CLINIC | Age: 86
End: 2021-09-27
Payer: MEDICARE

## 2021-09-27 VITALS
WEIGHT: 192 LBS | HEIGHT: 70 IN | DIASTOLIC BLOOD PRESSURE: 90 MMHG | RESPIRATION RATE: 14 BRPM | OXYGEN SATURATION: 98 % | SYSTOLIC BLOOD PRESSURE: 138 MMHG | TEMPERATURE: 99.2 F | BODY MASS INDEX: 27.49 KG/M2 | HEART RATE: 83 BPM

## 2021-09-27 DIAGNOSIS — G91.2 NPH (NORMAL PRESSURE HYDROCEPHALUS) (HCC): Primary | ICD-10-CM

## 2021-09-27 PROCEDURE — 99213 OFFICE O/P EST LOW 20 MIN: CPT | Performed by: INTERNAL MEDICINE

## 2021-09-27 NOTE — PROGRESS NOTES
Assessment/Plan:    Problem List Items Addressed This Visit     None      Visit Diagnoses     NPH (normal pressure hydrocephalus) ([facility] [address] )    -  Primary    Relevant Orders    Ambulatory referral to Neurosurgery           Diagnoses and all orders for this visit:    NPH (normal pressure hydrocephalus) ([facility] [address] )  -     Ambulatory referral to Neurosurgery; Future        No problem-specific Assessment & Plan notes found for this encounter  Subjective: Reviewed the CT scan that noted possible NPH  Patient ID: Harmony Ny is a 82 y o  male  The patient was seen and examined and we reviewed the CT scan of the head  The patient was noted to has a history of mild dementia  He has no incontinence, but does have a wide gate  We discussed the CT scan and I believe that it is reasonable to be followed up by neurosurgery  The following portions of the patient's history were reviewed and updated as appropriate:   He has a past medical history of Anemia (12/10/2012), BPH with obstruction/lower urinary tract symptoms, Cataract, Frequency of micturition, Hypercholesteremia, Memory loss, and Poor urinary stream ,  does not have any pertinent problems on file  ,   has a past surgical history that includes Hernia repair and Cataract extraction (Bilateral, 02/2019, 03/2019)  ,  family history includes Colon cancer in his family; Coronary artery disease in his mother; No Known Problems in his father  ,   reports that he has never smoked  He has never used smokeless tobacco  He reports current alcohol use of about 1 0 - 2 0 standard drinks of alcohol per week  He reports that he does not use drugs  ,  has No Known Allergies     Current Outpatient Medications   Medication Sig Dispense Refill    amLODIPine (NORVASC) 5 mg tablet 5 mg daily   0    Calcium Carbonate-Vitamin D (CALTRATE 600+D) 600-400 MG-UNIT per tablet Take by mouth      coenzyme Q-10 100 MG capsule Take 1 capsule by mouth daily        donepezil (ARICEPT) 5 mg tablet take 1 tablet by mouth once daily at bedtime 30 tablet 5    Eliquis 5 MG Take 5 mg by mouth 2 (two) times a day      Lecithin 1200 MG CAPS Take by mouth daily      metoprolol tartrate (LOPRESSOR) 25 mg tablet Take 1 tablet (25 mg total) by mouth every 12 (twelve) hours 180 tablet 1    Multiple Vitamins-Minerals (CENTRUM ADULTS PO) Take 1 tablet by mouth daily      Omega-3 1000 MG CAPS Take 1 capsule by mouth 2 (two) times a day        ramipril (ALTACE) 10 MG capsule Take by mouth daily        simvastatin (ZOCOR) 40 mg tablet Take 1 tablet (40 mg total) by mouth daily 90 tablet 3    tadalafil (CIALIS) 20 MG tablet Take 1 tablet (20 mg total) by mouth daily as needed for erectile dysfunction 30 tablet 3    betamethasone dipropionate (DIPROSONE) 0 05 % cream Apply topically 2 (two) times a day (Patient not taking: Reported on 9/15/2021) 30 g 0     No current facility-administered medications for this visit  Review of Systems   Constitutional: Negative for chills, fatigue and fever  HENT: Negative  Respiratory: Negative for cough, chest tightness and shortness of breath  Cardiovascular: Negative for chest pain and palpitations  Gastrointestinal: Negative for abdominal pain, constipation, diarrhea, nausea and vomiting  Genitourinary: Negative  Musculoskeletal: Negative for back pain and myalgias  Skin: Negative  Neurological: Negative  Psychiatric/Behavioral: Negative for dysphoric mood  The patient is not nervous/anxious  Objective:  Vitals:    09/27/21 1523   BP: 138/90   BP Location: Left arm   Patient Position: Sitting   Cuff Size: Large   Pulse: 83   Resp: 14   Temp: 99 2 °F (37 3 °C)   SpO2: 98%   Weight: 87 1 kg (192 lb)   Height: 5' 10" (1 778 m)     Body mass index is 27 55 kg/m²  Physical Exam  Vitals and nursing note reviewed  Constitutional:       Appearance: He is well-developed  HENT:      Head: Normocephalic and atraumatic     Eyes:      Pupils: Pupils are equal, round, and reactive to light  Cardiovascular:      Rate and Rhythm: Normal rate and regular rhythm  Heart sounds: Normal heart sounds  No murmur heard  Pulmonary:      Effort: Pulmonary effort is normal       Breath sounds: Normal breath sounds  No stridor  No rales  Abdominal:      General: Bowel sounds are normal  There is no distension  Palpations: Abdomen is soft  Tenderness: There is no abdominal tenderness  Musculoskeletal:         General: No deformity  Normal range of motion  Cervical back: Normal range of motion and neck supple  Skin:     General: Skin is warm and dry  Neurological:      Mental Status: He is alert and oriented to person, place, and time        Gait: Gait abnormal           PHQ-9 Depression Screening    PHQ-9:   Frequency of the following problems over the past two weeks:

## 2021-11-05 ENCOUNTER — APPOINTMENT (OUTPATIENT)
Dept: LAB | Facility: CLINIC | Age: 86
End: 2021-11-05
Payer: MEDICARE

## 2021-11-05 DIAGNOSIS — R97.20 ELEVATED PSA: ICD-10-CM

## 2021-11-05 LAB — PSA SERPL-MCNC: 4.5 NG/ML (ref 0–4)

## 2021-11-05 PROCEDURE — 84153 ASSAY OF PSA TOTAL: CPT

## 2021-12-02 ENCOUNTER — OFFICE VISIT (OUTPATIENT)
Dept: UROLOGY | Facility: MEDICAL CENTER | Age: 86
End: 2021-12-02
Payer: MEDICARE

## 2021-12-02 VITALS
BODY MASS INDEX: 27.2 KG/M2 | HEIGHT: 70 IN | DIASTOLIC BLOOD PRESSURE: 70 MMHG | HEART RATE: 98 BPM | SYSTOLIC BLOOD PRESSURE: 110 MMHG | WEIGHT: 190 LBS

## 2021-12-02 DIAGNOSIS — R97.20 ELEVATED PSA: ICD-10-CM

## 2021-12-02 DIAGNOSIS — N13.8 BPH WITH OBSTRUCTION/LOWER URINARY TRACT SYMPTOMS: Primary | ICD-10-CM

## 2021-12-02 DIAGNOSIS — N40.1 BPH WITH OBSTRUCTION/LOWER URINARY TRACT SYMPTOMS: Primary | ICD-10-CM

## 2021-12-02 DIAGNOSIS — N52.9 ERECTILE DYSFUNCTION, UNSPECIFIED ERECTILE DYSFUNCTION TYPE: ICD-10-CM

## 2021-12-02 LAB
SL AMB  POCT GLUCOSE, UA: NORMAL
SL AMB LEUKOCYTE ESTERASE,UA: NORMAL
SL AMB POCT BILIRUBIN,UA: NORMAL
SL AMB POCT BLOOD,UA: NORMAL
SL AMB POCT CLARITY,UA: CLEAR
SL AMB POCT COLOR,UA: YELLOW
SL AMB POCT KETONES,UA: NORMAL
SL AMB POCT NITRITE,UA: NORMAL
SL AMB POCT PH,UA: 5
SL AMB POCT SPECIFIC GRAVITY,UA: 1.01
SL AMB POCT URINE PROTEIN: NORMAL
SL AMB POCT UROBILINOGEN: 0.2

## 2021-12-02 PROCEDURE — 81003 URINALYSIS AUTO W/O SCOPE: CPT | Performed by: NURSE PRACTITIONER

## 2021-12-02 PROCEDURE — 99213 OFFICE O/P EST LOW 20 MIN: CPT | Performed by: NURSE PRACTITIONER

## 2021-12-02 RX ORDER — KETOCONAZOLE 20 MG/G
CREAM TOPICAL
COMMUNITY
Start: 2021-11-09

## 2021-12-10 ENCOUNTER — OFFICE VISIT (OUTPATIENT)
Dept: INTERNAL MEDICINE CLINIC | Facility: CLINIC | Age: 86
End: 2021-12-10
Payer: MEDICARE

## 2021-12-10 ENCOUNTER — APPOINTMENT (OUTPATIENT)
Dept: RADIOLOGY | Facility: CLINIC | Age: 86
End: 2021-12-10
Payer: MEDICARE

## 2021-12-10 VITALS
WEIGHT: 191.2 LBS | HEART RATE: 81 BPM | HEIGHT: 70 IN | BODY MASS INDEX: 27.37 KG/M2 | SYSTOLIC BLOOD PRESSURE: 130 MMHG | DIASTOLIC BLOOD PRESSURE: 88 MMHG | RESPIRATION RATE: 14 BRPM | TEMPERATURE: 97.5 F | OXYGEN SATURATION: 97 %

## 2021-12-10 DIAGNOSIS — R61 NIGHT SWEATS: Primary | ICD-10-CM

## 2021-12-10 DIAGNOSIS — I48.0 PAROXYSMAL ATRIAL FIBRILLATION (HCC): ICD-10-CM

## 2021-12-10 DIAGNOSIS — I10 BENIGN ESSENTIAL HYPERTENSION: ICD-10-CM

## 2021-12-10 DIAGNOSIS — E78.00 PURE HYPERCHOLESTEROLEMIA: ICD-10-CM

## 2021-12-10 DIAGNOSIS — R61 NIGHT SWEATS: ICD-10-CM

## 2021-12-10 PROCEDURE — 71046 X-RAY EXAM CHEST 2 VIEWS: CPT

## 2021-12-10 PROCEDURE — 99214 OFFICE O/P EST MOD 30 MIN: CPT | Performed by: INTERNAL MEDICINE

## 2021-12-14 ENCOUNTER — TELEPHONE (OUTPATIENT)
Dept: INTERNAL MEDICINE CLINIC | Facility: CLINIC | Age: 86
End: 2021-12-14

## 2021-12-14 ENCOUNTER — APPOINTMENT (OUTPATIENT)
Dept: LAB | Facility: CLINIC | Age: 86
End: 2021-12-14
Payer: MEDICARE

## 2021-12-14 DIAGNOSIS — R61 NIGHT SWEATS: ICD-10-CM

## 2021-12-14 LAB
ALBUMIN SERPL BCP-MCNC: 3.6 G/DL (ref 3.5–5)
ALP SERPL-CCNC: 44 U/L (ref 46–116)
ALT SERPL W P-5'-P-CCNC: 24 U/L (ref 12–78)
ANION GAP SERPL CALCULATED.3IONS-SCNC: 5 MMOL/L (ref 4–13)
AST SERPL W P-5'-P-CCNC: 15 U/L (ref 5–45)
BASOPHILS # BLD AUTO: 0.03 THOUSANDS/ΜL (ref 0–0.1)
BASOPHILS NFR BLD AUTO: 1 % (ref 0–1)
BILIRUB SERPL-MCNC: 0.83 MG/DL (ref 0.2–1)
BUN SERPL-MCNC: 14 MG/DL (ref 5–25)
CALCIUM SERPL-MCNC: 9.4 MG/DL (ref 8.3–10.1)
CHLORIDE SERPL-SCNC: 104 MMOL/L (ref 100–108)
CO2 SERPL-SCNC: 29 MMOL/L (ref 21–32)
CREAT SERPL-MCNC: 1.04 MG/DL (ref 0.6–1.3)
CRP SERPL QL: <3 MG/L
EOSINOPHIL # BLD AUTO: 0.15 THOUSAND/ΜL (ref 0–0.61)
EOSINOPHIL NFR BLD AUTO: 3 % (ref 0–6)
ERYTHROCYTE [DISTWIDTH] IN BLOOD BY AUTOMATED COUNT: 13 % (ref 11.6–15.1)
GFR SERPL CREATININE-BSD FRML MDRD: 65 ML/MIN/1.73SQ M
GLUCOSE SERPL-MCNC: 91 MG/DL (ref 65–140)
HCT VFR BLD AUTO: 46.8 % (ref 36.5–49.3)
HGB BLD-MCNC: 15.6 G/DL (ref 12–17)
IMM GRANULOCYTES # BLD AUTO: 0.01 THOUSAND/UL (ref 0–0.2)
IMM GRANULOCYTES NFR BLD AUTO: 0 % (ref 0–2)
LYMPHOCYTES # BLD AUTO: 1.24 THOUSANDS/ΜL (ref 0.6–4.47)
LYMPHOCYTES NFR BLD AUTO: 21 % (ref 14–44)
MCH RBC QN AUTO: 32.8 PG (ref 26.8–34.3)
MCHC RBC AUTO-ENTMCNC: 33.3 G/DL (ref 31.4–37.4)
MCV RBC AUTO: 99 FL (ref 82–98)
MONOCYTES # BLD AUTO: 0.43 THOUSAND/ΜL (ref 0.17–1.22)
MONOCYTES NFR BLD AUTO: 7 % (ref 4–12)
NEUTROPHILS # BLD AUTO: 3.93 THOUSANDS/ΜL (ref 1.85–7.62)
NEUTS SEG NFR BLD AUTO: 68 % (ref 43–75)
NRBC BLD AUTO-RTO: 0 /100 WBCS
PLATELET # BLD AUTO: 211 THOUSANDS/UL (ref 149–390)
PMV BLD AUTO: 8.6 FL (ref 8.9–12.7)
POTASSIUM SERPL-SCNC: 3.7 MMOL/L (ref 3.5–5.3)
PROT SERPL-MCNC: 7.2 G/DL (ref 6.4–8.2)
RBC # BLD AUTO: 4.75 MILLION/UL (ref 3.88–5.62)
SODIUM SERPL-SCNC: 138 MMOL/L (ref 136–145)
TSH SERPL DL<=0.05 MIU/L-ACNC: 2.63 UIU/ML (ref 0.36–3.74)
WBC # BLD AUTO: 5.79 THOUSAND/UL (ref 4.31–10.16)

## 2021-12-14 PROCEDURE — 86140 C-REACTIVE PROTEIN: CPT

## 2021-12-14 PROCEDURE — 84443 ASSAY THYROID STIM HORMONE: CPT

## 2021-12-14 PROCEDURE — 80053 COMPREHEN METABOLIC PANEL: CPT

## 2021-12-14 PROCEDURE — 85025 COMPLETE CBC W/AUTO DIFF WBC: CPT

## 2021-12-14 PROCEDURE — 36415 COLL VENOUS BLD VENIPUNCTURE: CPT

## 2021-12-29 ENCOUNTER — HOSPITAL ENCOUNTER (OUTPATIENT)
Dept: CT IMAGING | Facility: HOSPITAL | Age: 86
Discharge: HOME | End: 2021-12-29
Attending: INTERNAL MEDICINE
Payer: MEDICARE

## 2021-12-29 DIAGNOSIS — K65.1 ABSCESS OF ABDOMINAL CAVITY (HCC): ICD-10-CM

## 2021-12-29 PROCEDURE — 74177 CT ABD & PELVIS W/CONTRAST: CPT

## 2021-12-29 PROCEDURE — G1004 CDSM NDSC: HCPCS

## 2021-12-29 RX ADMIN — IOHEXOL 100 ML: 350 INJECTION, SOLUTION INTRAVENOUS at 11:15

## 2022-02-28 DIAGNOSIS — R41.3 SHORT-TERM MEMORY LOSS: ICD-10-CM

## 2022-02-28 RX ORDER — DONEPEZIL HYDROCHLORIDE 5 MG/1
5 TABLET, FILM COATED ORAL
Qty: 30 TABLET | Refills: 5 | Status: SHIPPED | OUTPATIENT
Start: 2022-02-28

## 2022-03-11 DIAGNOSIS — E78.5 HYPERLIPIDEMIA, UNSPECIFIED HYPERLIPIDEMIA TYPE: ICD-10-CM

## 2022-03-11 RX ORDER — SIMVASTATIN 40 MG
40 TABLET ORAL DAILY
Qty: 90 TABLET | Refills: 3 | Status: SHIPPED | OUTPATIENT
Start: 2022-03-11

## 2022-03-21 ENCOUNTER — OFFICE VISIT (OUTPATIENT)
Dept: INTERNAL MEDICINE CLINIC | Facility: CLINIC | Age: 87
End: 2022-03-21
Payer: MEDICARE

## 2022-03-21 ENCOUNTER — TELEPHONE (OUTPATIENT)
Dept: SLEEP CENTER | Facility: CLINIC | Age: 87
End: 2022-03-21

## 2022-03-21 VITALS
WEIGHT: 196 LBS | HEART RATE: 67 BPM | DIASTOLIC BLOOD PRESSURE: 70 MMHG | OXYGEN SATURATION: 98 % | HEIGHT: 70 IN | SYSTOLIC BLOOD PRESSURE: 134 MMHG | BODY MASS INDEX: 28.06 KG/M2 | TEMPERATURE: 96.9 F

## 2022-03-21 DIAGNOSIS — E78.00 PURE HYPERCHOLESTEROLEMIA: ICD-10-CM

## 2022-03-21 DIAGNOSIS — I48.0 PAROXYSMAL ATRIAL FIBRILLATION (HCC): ICD-10-CM

## 2022-03-21 DIAGNOSIS — G47.33 SLEEP APNEA, OBSTRUCTIVE: ICD-10-CM

## 2022-03-21 DIAGNOSIS — Z00.00 MEDICARE ANNUAL WELLNESS VISIT, SUBSEQUENT: Primary | ICD-10-CM

## 2022-03-21 DIAGNOSIS — I10 BENIGN ESSENTIAL HYPERTENSION: ICD-10-CM

## 2022-03-21 PROBLEM — I35.1 NONRHEUMATIC AORTIC VALVE INSUFFICIENCY: Status: ACTIVE | Noted: 2022-03-03

## 2022-03-21 PROCEDURE — G0439 PPPS, SUBSEQ VISIT: HCPCS | Performed by: INTERNAL MEDICINE

## 2022-03-21 PROCEDURE — 1123F ACP DISCUSS/DSCN MKR DOCD: CPT | Performed by: INTERNAL MEDICINE

## 2022-03-21 PROCEDURE — 99213 OFFICE O/P EST LOW 20 MIN: CPT | Performed by: INTERNAL MEDICINE

## 2022-03-21 NOTE — PATIENT INSTRUCTIONS

## 2022-03-21 NOTE — TELEPHONE ENCOUNTER
----- Message from Katie Cody MD sent at 3/21/2022 11:58 AM EDT -----  Approved  ----- Message -----  From: Haley Holland  Sent: 3/18/2022   8:05 AM EDT  To: Sleep Medicine [place] Provider    This sleep study needs approval      If approved please sign and return to clerical pool  If denied please include reasons why  Also provide alternative testing if warranted  Please sign and return to clerical pool

## 2022-03-21 NOTE — PROGRESS NOTES
Assessment/Plan:    Problem List Items Addressed This Visit        Respiratory    Sleep apnea, obstructive     Follow up sleep study pending            Cardiovascular and Mediastinum    Benign essential hypertension - Primary     BP mildly elevated  Continue Metoprolol Ramipril and Amlodipine         Paroxysmal atrial fibrillation (HCC)     On Eliquis and tolerating            Other    Pure hypercholesterolemia     Tolerating the Simvastatin  Diagnoses and all orders for this visit:    Benign essential hypertension    Sleep apnea, obstructive    Paroxysmal atrial fibrillation ([address] )    Pure hypercholesterolemia        Sleep apnea, obstructive  Follow up sleep study pending    Benign essential hypertension  BP mildly elevated  Continue Metoprolol Ramipril and Amlodipine    Paroxysmal atrial fibrillation (HCC)  On Eliquis and tolerating    Pure hypercholesterolemia  Tolerating the Simvastatin  Subjective:      Patient ID: Brisa Bautista is a 82 y o  male  The patient was seen and examined and noted to have issues with continued night sweats  The patient's weight is up 5 lbs  He appears in good health  His mentation is stable  The patient notes no other issues at this time  He is following up with and EP physician regarding the A-Fib  He is set up for the a sleep study  He is doing well on the Aricept  His wife would like to switch it to the AM       The following portions of the patient's history were reviewed and updated as appropriate:   He has a past medical history of Anemia (12/10/2012), BPH with obstruction/lower urinary tract symptoms, Cataract, Frequency of micturition, Hypercholesteremia, Memory loss, and Poor urinary stream ,  does not have any pertinent problems on file  ,   has a past surgical history that includes Hernia repair and Cataract extraction (Bilateral, 02/2019, 03/2019)  ,  family history includes Colon cancer in his family; Coronary artery disease in his mother; No Known Problems in his father  ,   reports that he has never smoked  He has never used smokeless tobacco  He reports current alcohol use of about 1 0 - 2 0 standard drink of alcohol per week  He reports that he does not use drugs  ,  has No Known Allergies     Current Outpatient Medications   Medication Sig Dispense Refill    amLODIPine (NORVASC) 5 mg tablet 5 mg daily   0    Calcium Carbonate-Vitamin D (CALTRATE 600+D) 600-400 MG-UNIT per tablet Take by mouth      coenzyme Q-10 100 MG capsule Take 1 capsule by mouth daily        donepezil (ARICEPT) 5 mg tablet Take 1 tablet (5 mg total) by mouth daily at bedtime 30 tablet 5    Eliquis 5 MG Take 5 mg by mouth 2 (two) times a day      ketoconazole (NIZORAL) 2 % cream apply topically to affected area OF FEET BETWEEN TOES TWICE DAILY FOR 6-8 WEEKS      Lecithin 1200 MG CAPS Take by mouth daily      metoprolol tartrate (LOPRESSOR) 25 mg tablet Take 1 tablet (25 mg total) by mouth every 12 (twelve) hours 180 tablet 1    Multiple Vitamins-Minerals (CENTRUM ADULTS PO) Take 1 tablet by mouth daily      Omega-3 1000 MG CAPS Take 1 capsule by mouth 2 (two) times a day        ramipril (ALTACE) 10 MG capsule Take by mouth daily        simvastatin (ZOCOR) 40 mg tablet Take 1 tablet (40 mg total) by mouth daily 90 tablet 3    betamethasone dipropionate (DIPROSONE) 0 05 % cream Apply topically 2 (two) times a day 30 g 0    tadalafil (CIALIS) 20 MG tablet Take 1 tablet (20 mg total) by mouth daily as needed for erectile dysfunction (Patient not taking: Reported on 3/21/2022 ) 30 tablet 3     No current facility-administered medications for this visit  Review of Systems   Constitutional: Negative for chills, fatigue and fever  HENT: Negative  Respiratory: Negative for cough, chest tightness and shortness of breath  Cardiovascular: Negative for chest pain and palpitations     Gastrointestinal: Negative for abdominal pain, constipation, diarrhea, nausea and vomiting  Genitourinary: Negative  Musculoskeletal: Negative for back pain and myalgias  Skin: Negative  Neurological: Negative  Psychiatric/Behavioral: Negative for dysphoric mood  The patient is not nervous/anxious  Objective:  Vitals:    03/21/22 0900   BP: 134/70   BP Location: Left arm   Patient Position: Sitting   Cuff Size: Large   Pulse: 67   Temp: (!) 96 9 °F (36 1 °C)   SpO2: 98%   Weight: 88 9 kg (196 lb)   Height: 5' 10" (1 778 m)     Body mass index is 28 12 kg/m²  Physical Exam  Vitals and nursing note reviewed  Constitutional:       Appearance: He is well-developed  HENT:      Head: Normocephalic and atraumatic  Eyes:      Pupils: Pupils are equal, round, and reactive to light  Cardiovascular:      Rate and Rhythm: Normal rate and regular rhythm  Heart sounds: Normal heart sounds  No murmur heard  Pulmonary:      Effort: Pulmonary effort is normal       Breath sounds: Normal breath sounds  No stridor  No rales  Abdominal:      General: Bowel sounds are normal  There is no distension  Palpations: Abdomen is soft  Tenderness: There is no abdominal tenderness  Musculoskeletal:         General: No deformity  Normal range of motion  Cervical back: Normal range of motion and neck supple  Skin:     General: Skin is warm and dry  Neurological:      Mental Status: He is alert and oriented to person, place, and time            PHQ-2/9 Depression Screening    Little interest or pleasure in doing things: 0 - not at all  Feeling down, depressed, or hopeless: 0 - not at all  PHQ-2 Score: 0  PHQ-2 Interpretation: Negative depression screen

## 2022-03-21 NOTE — PROGRESS NOTES
Assessment and Plan:     Problem List Items Addressed This Visit     None           Preventive health issues were discussed with patient, and age appropriate screening tests were ordered as noted in patient's After Visit Summary  Personalized health advice and appropriate referrals for health education or preventive services given if needed, as noted in patient's After Visit Summary       History of Present Illness:     Patient presents for Medicare Annual Wellness visit    Patient Care Team:  Israel Davila DO as PCP - General (Internal Medicine)  Brandie Bowen MD (Cardiology)  Liza Acosta MD (Urology)     Problem List:     Patient Active Problem List   Diagnosis    Benign colon polyp    Benign essential hypertension    D-dimer, elevated    Combined arterial insufficiency and corporo-venous occlusive erectile dysfunction    Pure hypercholesterolemia    Paroxysmal atrial fibrillation (HCC)    Acquired trigger finger    Low back pain    BPH with obstruction/lower urinary tract symptoms    Pre-operative clearance    Excessive sweating    Frequency of micturition      Past Medical and Surgical History:     Past Medical History:   Diagnosis Date    Anemia 12/10/2012    BPH with obstruction/lower urinary tract symptoms     Cataract     Frequency of micturition     Hypercholesteremia     Memory loss     Poor urinary stream      Past Surgical History:   Procedure Laterality Date    CATARACT EXTRACTION Bilateral 02/2019, 03/2019    HERNIA REPAIR        Family History:     Family History   Problem Relation Age of Onset    Coronary artery disease Mother     No Known Problems Father     Colon cancer Family       Social History:     Social History     Socioeconomic History    Marital status: /Civil Union     Spouse name: None    Number of children: None    Years of education: None    Highest education level: None   Occupational History    Occupation: RETIRED   Tobacco Use    Smoking status: Never Smoker    Smokeless tobacco: Never Used   Vaping Use    Vaping Use: Never used   Substance and Sexual Activity    Alcohol use:  Yes     Alcohol/week: 1 0 - 2 0 standard drink     Types: 1 - 2 Glasses of wine per week     Comment: weekly    Drug use: No    Sexual activity: None   Other Topics Concern    None   Social History Narrative        RETIRED     Social Determinants of Health     Financial Resource Strain: Not on file   Food Insecurity: Not on file   Transportation Needs: Not on file   Physical Activity: Not on file   Stress: Not on file   Social Connections: Not on file   Intimate Partner Violence: Not on file   Housing Stability: Not on file      Medications and Allergies:     Current Outpatient Medications   Medication Sig Dispense Refill    amLODIPine (NORVASC) 5 mg tablet 5 mg daily   0    Calcium Carbonate-Vitamin D (CALTRATE 600+D) 600-400 MG-UNIT per tablet Take by mouth      coenzyme Q-10 100 MG capsule Take 1 capsule by mouth daily        donepezil (ARICEPT) 5 mg tablet Take 1 tablet (5 mg total) by mouth daily at bedtime 30 tablet 5    Eliquis 5 MG Take 5 mg by mouth 2 (two) times a day      ketoconazole (NIZORAL) 2 % cream apply topically to affected area OF FEET BETWEEN TOES TWICE DAILY FOR 6-8 WEEKS      Lecithin 1200 MG CAPS Take by mouth daily      metoprolol tartrate (LOPRESSOR) 25 mg tablet Take 1 tablet (25 mg total) by mouth every 12 (twelve) hours 180 tablet 1    Multiple Vitamins-Minerals (CENTRUM ADULTS PO) Take 1 tablet by mouth daily      Omega-3 1000 MG CAPS Take 1 capsule by mouth 2 (two) times a day        ramipril (ALTACE) 10 MG capsule Take by mouth daily        simvastatin (ZOCOR) 40 mg tablet Take 1 tablet (40 mg total) by mouth daily 90 tablet 3    betamethasone dipropionate (DIPROSONE) 0 05 % cream Apply topically 2 (two) times a day 30 g 0    tadalafil (CIALIS) 20 MG tablet Take 1 tablet (20 mg total) by mouth daily as needed for erectile dysfunction (Patient not taking: Reported on 3/21/2022 ) 30 tablet 3     No current facility-administered medications for this visit  No Known Allergies   Immunizations:     Immunization History   Administered Date(s) Administered    COVID-19 MODERNA VACC 0 5 ML IM 01/20/2021, 02/17/2021, 12/03/2021    INFLUENZA 09/30/2015, 09/07/2018, 10/15/2018, 10/03/2019, 09/23/2020    Influenza Quadrivalent Preservative Free 3 years and older IM 09/30/2015    Influenza Split High Dose Preservative Free IM 10/05/2016, 10/09/2017    Influenza, high dose seasonal 0 7 mL 09/23/2020, 09/15/2021    Influenza, seasonal, injectable 10/03/2012, 10/02/2013    Pneumococcal Conjugate 13-Valent 03/21/2016, 04/05/2017    Tdap 03/02/2015    Zoster Vaccine Recombinant 10/16/2018    influenza, trivalent, adjuvanted 09/30/2019      Health Maintenance: There are no preventive care reminders to display for this patient  Topic Date Due    Pneumococcal Vaccine: 65+ Years (2 of 2 - PPSV23) 04/05/2018      Medicare Health Risk Assessment:     /70 (BP Location: Left arm, Patient Position: Sitting, Cuff Size: Large)   Pulse 67   Temp (!) 96 9 °F (36 1 °C)   Ht 5' 10" (1 778 m)   Wt 88 9 kg (196 lb)   SpO2 98%   BMI 28 12 kg/m²      Shira Sandhu is here for his Subsequent Wellness visit  Last Medicare Wellness visit information reviewed, patient interviewed and updates made to the record today  Health Risk Assessment:   Patient rates overall health as very good  Patient feels that their physical health rating is same  Patient is satisfied with their life  Eyesight was rated as same  Hearing was rated as same  Patient feels that their emotional and mental health rating is same  Patients states they are never, rarely angry  Patient states they are never, rarely unusually tired/fatigued  Pain experienced in the last 7 days has been none  Patient states that he has experienced no weight loss or gain in last 6 months  Depression Screening:   PHQ-2 Score: 0      Fall Risk Screening: In the past year, patient has experienced: no history of falling in past year      Home Safety:  Patient does not have trouble with stairs inside or outside of their home  Patient has working smoke alarms and has working carbon monoxide detector  Home safety hazards include: none  Nutrition:   Current diet is Regular  Medications:   Patient is currently taking over-the-counter supplements  OTC medications include: see medication list  Patient is able to manage medications  Activities of Daily Living (ADLs)/Instrumental Activities of Daily Living (IADLs):   Walk and transfer into and out of bed and chair?: Yes  Dress and groom yourself?: Yes    Bathe or shower yourself?: Yes    Feed yourself? Yes  Do your laundry/housekeeping?: Yes  Manage your money, pay your bills and track your expenses?: No  Make your own meals?: No    Do your own shopping?: Yes    Previous Hospitalizations:   Any hospitalizations or ED visits within the last 12 months?: No      Advance Care Planning:   Living will: Yes    Durable POA for healthcare:  Yes    Advanced directive: Yes    Advanced directive counseling given: No    Five wishes given: No    Patient declined ACP directive: No    End of Life Decisions reviewed with patient: Yes    Provider agrees with end of life decisions: Yes      Cognitive Screening:   Provider or family/friend/caregiver concerned regarding cognition?: No    PREVENTIVE SCREENINGS      Cardiovascular Screening:    General: Screening Not Indicated and History Lipid Disorder      Diabetes Screening:     General: Screening Current      Colorectal Cancer Screening:     General: Screening Not Indicated      Prostate Cancer Screening:    General: Screening Not Indicated      Osteoporosis Screening:    General: Screening Not Indicated      Abdominal Aortic Aneurysm (AAA) Screening:        General: Screening Not Indicated      Lung Cancer Screening:     General: Screening Not Indicated      Hepatitis C Screening:    General: Screening Not Indicated    Screening, Brief Intervention, and Referral to Treatment (SBIRT)    Screening  Typical number of drinks in a day: 0  Typical number of drinks in a week: 2  Interpretation: Low risk drinking behavior      Single Item Drug Screening:  How often have you used an illegal drug (including marijuana) or a prescription medication for non-medical reasons in the past year? never    Single Item Drug Screen Score: 0  Interpretation: Negative screen for possible drug use disorder      Azra Cousins, DO

## 2022-03-24 DIAGNOSIS — I48.0 PAROXYSMAL ATRIAL FIBRILLATION (HCC): ICD-10-CM

## 2022-05-31 ENCOUNTER — APPOINTMENT (OUTPATIENT)
Dept: LAB | Facility: CLINIC | Age: 87
End: 2022-05-31
Payer: MEDICARE

## 2022-05-31 DIAGNOSIS — R97.20 ELEVATED PSA: ICD-10-CM

## 2022-05-31 LAB — PSA SERPL-MCNC: 5.2 NG/ML (ref 0–4)

## 2022-05-31 PROCEDURE — 84153 ASSAY OF PSA TOTAL: CPT

## 2022-07-14 ENCOUNTER — OFFICE VISIT (OUTPATIENT)
Dept: UROLOGY | Facility: MEDICAL CENTER | Age: 87
End: 2022-07-14
Payer: MEDICARE

## 2022-07-14 VITALS
SYSTOLIC BLOOD PRESSURE: 110 MMHG | HEART RATE: 73 BPM | WEIGHT: 188 LBS | DIASTOLIC BLOOD PRESSURE: 70 MMHG | HEIGHT: 70 IN | BODY MASS INDEX: 26.92 KG/M2

## 2022-07-14 DIAGNOSIS — R97.20 ELEVATED PSA: Primary | ICD-10-CM

## 2022-07-14 PROCEDURE — 99213 OFFICE O/P EST LOW 20 MIN: CPT | Performed by: NURSE PRACTITIONER

## 2022-07-14 NOTE — PROGRESS NOTES
7/14/2022    Assessment and Plan    82 y o  male managed by Dr Huber Rang    1  Benign prostatic hyperplasia with lower urine tract symptoms  · PSA performed 05/31/2022 resulted 5 2  · RAQUEL-prostate approximate 40-45 g with no nodules  Smooth symmetrical   Nontender  · Repeat PSA in 6 months RAQUEL in 1 year    2  Erectile dysfunction  · Continue with tadalafil as needed-prescription refill not needed at this time    History of Present Illness  Kenia Montgomery is a 82 y o  male here for follow up evaluation of  urinary symptoms secondary to benign prostatic hyperplasia   Patient also has a history of erectile dysfunction for which he has been managed with tadalafil with good effects and minimal side effects    Patient does have a most recent PSA performed 08/03/2021 resulting 6 4   PSA trend below   Patient currently denies all lower urinary tract symptoms   He is currently satisfied with his urinary symptoms   He reports sensation of complete bladder emptying with urination   He denies changes to his general health since prior office evaluation  Component PSA, Total   Latest Ref Rng & Units 0 0 - 4 0 ng/mL   2/19/2018 3 2   4/3/2019 4 8 (H)   5/10/2019 3 4   8/19/2020 3 5   8/3/2021 6 4 (H)   11/5/2021 4 5 (H)   5/31/2022 5 2 (H)       Review of Systems   Constitutional: Negative for chills and fever  Respiratory: Negative for cough and shortness of breath  Cardiovascular: Negative for chest pain  Gastrointestinal: Negative for abdominal distention, abdominal pain, blood in stool, nausea and vomiting  Genitourinary: Negative for difficulty urinating, dysuria, enuresis, flank pain, frequency, hematuria and urgency  Skin: Negative for rash       Past Medical History  Past Medical History:   Diagnosis Date    Anemia 12/10/2012    BPH with obstruction/lower urinary tract symptoms     Cataract     Frequency of micturition     Hypercholesteremia     Memory loss     Poor urinary stream        Past Social History  Past Surgical History:   Procedure Laterality Date    CATARACT EXTRACTION Bilateral 02/2019, 03/2019    HERNIA REPAIR       Social History     Tobacco Use   Smoking Status Never Smoker   Smokeless Tobacco Never Used       Past Family History  Family History   Problem Relation Age of Onset    Coronary artery disease Mother     No Known Problems Father     Colon cancer Family        Past Social history  Social History     Socioeconomic History    Marital status: /Civil Union     Spouse name: Not on file    Number of children: Not on file    Years of education: Not on file    Highest education level: Not on file   Occupational History    Occupation: RETIRED   Tobacco Use    Smoking status: Never Smoker    Smokeless tobacco: Never Used   Vaping Use    Vaping Use: Never used   Substance and Sexual Activity    Alcohol use:  Yes     Alcohol/week: 1 0 - 2 0 standard drink     Types: 1 - 2 Glasses of wine per week     Comment: weekly    Drug use: No    Sexual activity: Not on file   Other Topics Concern    Not on file   Social History Narrative        RETIRED     Social Determinants of Health     Financial Resource Strain: Not on file   Food Insecurity: Not on file   Transportation Needs: Not on file   Physical Activity: Not on file   Stress: Not on file   Social Connections: Not on file   Intimate Partner Violence: Not on file   Housing Stability: Not on file       Current Medications  Current Outpatient Medications   Medication Sig Dispense Refill    amLODIPine (NORVASC) 5 mg tablet 5 mg daily   0    Calcium Carbonate-Vitamin D 600-400 MG-UNIT per tablet Take by mouth      coenzyme Q-10 100 MG capsule Take 1 capsule by mouth daily        donepezil (ARICEPT) 5 mg tablet Take 1 tablet (5 mg total) by mouth daily at bedtime 30 tablet 5    Eliquis 5 MG Take 5 mg by mouth 2 (two) times a day      Lecithin 1200 MG CAPS Take by mouth daily      metoprolol tartrate (LOPRESSOR) 25 mg tablet Take 1 tablet (25 mg total) by mouth every 12 (twelve) hours 180 tablet 1    Multiple Vitamins-Minerals (CENTRUM ADULTS PO) Take 1 tablet by mouth daily      Omega-3 1000 MG CAPS Take 1 capsule by mouth 2 (two) times a day        ramipril (ALTACE) 10 MG capsule Take by mouth daily        simvastatin (ZOCOR) 40 mg tablet Take 1 tablet (40 mg total) by mouth daily 90 tablet 3    betamethasone dipropionate (DIPROSONE) 0 05 % cream Apply topically 2 (two) times a day 30 g 0    ketoconazole (NIZORAL) 2 % cream apply topically to affected area OF FEET BETWEEN TOES TWICE DAILY FOR 6-8 WEEKS (Patient not taking: Reported on 7/14/2022)      tadalafil (CIALIS) 20 MG tablet Take 1 tablet (20 mg total) by mouth daily as needed for erectile dysfunction 30 tablet 3     No current facility-administered medications for this visit  Allergies  No Known Allergies      The following portions of the patient's history were reviewed and updated as appropriate: allergies, current medications, past medical history, past social history, past surgical history and problem list       Vitals  Vitals:    07/14/22 1348   BP: 110/70   Pulse: 73   Weight: 85 3 kg (188 lb)   Height: 5' 10" (1 778 m)           Physical Exam  Physical Exam  Vitals reviewed  Constitutional:       General: He is not in acute distress  Appearance: Normal appearance  He is normal weight  HENT:      Head: Normocephalic  Cardiovascular:      Rate and Rhythm: Normal rate  Pulmonary:      Effort: No respiratory distress  Breath sounds: Normal breath sounds  Genitourinary:     Comments: RAQUEL-prostate 40-45 g with no nodules  Skin:     General: Skin is warm and dry  Neurological:      General: No focal deficit present  Mental Status: He is alert and oriented to person, place, and time     Psychiatric:         Mood and Affect: Mood normal          Behavior: Behavior normal        Results  No results found for this or any previous visit (from the past 1 hour(s)) ]  Lab Results   Component Value Date    PSA 5 2 (H) 05/31/2022    PSA 4 5 (H) 11/05/2021    PSA 6 4 (H) 08/03/2021     Lab Results   Component Value Date    GLUCOSE 88 03/06/2015    CALCIUM 9 4 12/14/2021     03/06/2015    K 3 7 12/14/2021    CO2 29 12/14/2021     12/14/2021    BUN 14 12/14/2021    CREATININE 1 04 12/14/2021     Lab Results   Component Value Date    WBC 5 79 12/14/2021    HGB 15 6 12/14/2021    HCT 46 8 12/14/2021    MCV 99 (H) 12/14/2021     12/14/2021           Orders  Orders Placed This Encounter   Procedures    PSA Total, Diagnostic     Standing Status:   Future     Standing Expiration Date:   7/14/2023       VARGAS Cruz

## 2022-08-08 ENCOUNTER — TELEMEDICINE (OUTPATIENT)
Dept: INTERNAL MEDICINE CLINIC | Facility: CLINIC | Age: 87
End: 2022-08-08
Payer: MEDICARE

## 2022-08-08 DIAGNOSIS — U07.1 COVID-19 VIRUS DETECTED: Primary | ICD-10-CM

## 2022-08-08 PROCEDURE — 99213 OFFICE O/P EST LOW 20 MIN: CPT | Performed by: NURSE PRACTITIONER

## 2022-08-08 NOTE — PROGRESS NOTES
COVID-19 Outpatient Progress Note    Assessment/Plan:    Problem List Items Addressed This Visit        Other    COVID-19 virus detected - Primary     2022    Pt was tested for covid at home today and it was positive, wife states that pt had a fever this am of 100  She gave him meds and tried to bring down the temp   She did not have his current temp as of rooming the pt states he feels warm,   Co scratchy throat, tired, sweaty, no headaches, no body aches, has taste and smell  Vaccinated: yes, due for booster  Discussed Risks and Benefits of Vaccination   S/S started: 2022  Positive sick contacts: unknown  COVID-19 test on: 2022  Positive for: fever this am of 100  She gave him meds and tried to bring down the temp   She did not have his current temp as of rooming the pt states he feels warm,   Co scratchy throat, tired, sweaty, no headaches, no body aches, has taste and smell  Denies the following: loss of taste and smell  Discussed isolation/quarantine until 24 hours after all symptoms have stopped (without fever reducing medications)  Temperature of Less Than 100 degrees for a Minimum of 24 Hours  New CDC guidelines for Returning to Work:   If your test is positive for COVID-19 you should stay Isolated for at least 5 days since you first has symptoms prior to returning to work  If your test is positive for COVID-19 you should stay Isolated for at least 10 days since you first has symptoms prior to returning to work  Discussed keeping active to prevent blood clots - do not just lay around - get up and walk around   Discussed deep breathing exercises, reposition yourself often   Discussed Proning: To Increase Oxygen Saturation and Improve Breathin Steps - Repeated   1  Fredna Arch onto your stomach - position your head in whatever position is comfortable to breath (you may use pillows to prop)  30 minutes to 2 hours  2  Switch to Lying on your Right Side   30 minutes to 2 hours  3   Switch to Sitting Upright (most comfortable for you)  30 minutes to 2 hours  4  Switch to Lying on your Left Side   30 minutes to 2 hours  Reminded to change toothbrush to prevent re-contamination  Start taking Vitamin C 1000mg by mouth daily  Start taking Vitamin D 2000mg by mouth daily  Start taking Zinc 220mg by mouth daily OR a multivitamin daily   Transmission is by respiratory secretions/Isolation Guidelines:  Keep a Distance of at [employer] 6 Feet  Do Not Go Out In Public  Use a Separate Bathroom or Clean with a Disinfectant (Clorox) after each use  Do Not Share: Dishes, Cups, Silverware, Towels, Bedding  Cover your Mouth and Nose AT [facility] Your Hands Often (Even After Using Hand )  Signs and Symptoms May Show Up 2 to 14 Days AFTER You have Been Exposed - This allows for this Virus to Spread! Fever, Cough, Shortness of Breath, Nasal and Chest Congestion   Chills, Diarrhea, Nausea, Vomiting, Abdominal Pain  Muscle Aches, Headaches, Dizziness  Post Nasal Drip, Sore Throat, Loss of Taste and/or Smell  Some or All of These Signs and Symptoms Can Last For Days, Weeks, or Even 3 to 6 Months and Longer  Monoclonal Antibody Infusion Treatment: This treatment has been granted FDA Emergency Use Authorization (EUA) for the treatment of mild to moderate COVID-19 disease  Our Supply is Very Limited  Current Recommendations is to have this Administered within 5 days of the Onset of Symptoms  It is only being approved for patient's who have NOT been Vaccinated  STOP SIMVASTATIN at least 12 hours prior to initiation of Paxlovid, during the 5 days of Paxlovid treatment and for 5 days after completing Paxlovid  Reduce dose of AMLODIPINE in HALF if patient has low blood pressure  APIXABAN (ELIQUIS) 5mg BID: Reduce dose by 50% - TAKE 2 5MG TWICE A DAY DURING THE TIME YOU ARE TAKING THE PAXLOVID, AND FOR 3 days after YOU FINISH THE Paxlovid           Relevant Medications    nirmatrelvir & ritonavir (Paxlovid) tablet therapy pack         Disposition:     Patient is fully vaccinated and I recommended self quarantine for 5 days followed by strict mask use for an additional 5 days  If patient were to develop symptoms, they should immediately self isolate and call our office for further guidance  Discussed symptom directed medication options with patient  Discussed vitamin D, vitamin C, and/or zinc supplementation with patient  Discussed risks, benefits, and side effects of inhaled corticosteroids and they agree to treatment  Patient meets criteria for PAXLOVID and they have been counseled appropriately according to EUA documentation released by the FDA  After discussion, patient agrees to treatment  Sruthi Karnes is an investigational medicine used to treat mild-to-moderate COVID-19 in adults and children (15years of age and older weighing at least 80 pounds (40 kg)) with positive results of direct SARS-CoV-2 viral testing, and who are at high risk for progression to severe COVID-19, including hospitalization or death  PAXLOVID is investigational because it is still being studied  There is limited information about the safety and effectiveness of using PAXLOVID to treat people with mild-to-moderate COVID-19  The FDA has authorized the emergency use of PAXLOVID for the treatment of mild-tomoderate COVID-19 in adults and children (15years of age and older weighing at least 80 pounds (40 kg)) with a positive test for the virus that causes COVID-19, and who are at high risk for progression to severe COVID-19, including hospitalization or death, under an EUA  What should I tell my healthcare provider before I take PAXLOVID?     Tell your healthcare provider if you:  - Have any allergies  - Have liver or kidney disease  - Are pregnant or plan to become pregnant  - Are breastfeeding a child  - Have any serious illnesses    Tell your healthcare provider about all the medicines you take, including prescription and over-the-counter medicines, vitamins, and herbal supplements  Some medicines may interact with PAXLOVID and may cause serious side effects  Keep a list of your medicines to show your healthcare provider and pharmacist when you get a new medicine  You can ask your healthcare provider or pharmacist for a list of medicines that interact with PAXLOVID  Do not start taking a new medicine without telling your healthcare provider  Your healthcare provider can tell you if it is safe to take PAXLOVID with other medicines  Tell your healthcare provider if you are taking combined hormonal contraceptive  PAXLOVID may affect how your birth control pills work  Females who are able to become pregnant should use another effective alternative form of contraception or an additional barrier method of contraception  Talk to your healthcare provider if you have any questions about contraceptive methods that might be right for you  How do I take PAXLOVID? PAXLOVID consists of 2 medicines: nirmatrelvir and ritonavir  - Take 2 pink tablets of nirmatrelvir with 1 white tablet of ritonavir by mouth 2 times each day (in the morning and in the evening) for 5 days  For each dose, take all 3 tablets at the same time  - If you have kidney disease, talk to your healthcare provider  You may need a different dose  - Swallow the tablets whole  Do not chew, break, or crush the tablets  - Take PAXLOVID with or without food  - Do not stop taking PAXLOVID without talking to your healthcare provider, even if you feel better  - If you miss a dose of PAXLOVID within 8 hours of the time it is usually taken, take it as soon as you remember  If you miss a dose by more than 8 hours, skip the missed dose and take the next dose at your regular time  Do not take 2 doses of PAXLOVID at the same time  - If you take too much PAXLOVID, call your healthcare provider or go to the nearest hospital emergency room right away    - If you are taking a ritonavir- or cobicistat-containing medicine to treat hepatitis C or Human Immunodeficiency Virus (HIV), you should continue to take your medicine as prescribed by your healthcare provider   - Talk to your healthcare provider if you do not feel better or if you feel worse after 5 days  Who should generally not take PAXLOVID? Do not take PAXLOVID if:  You are allergic to nirmatrelvir, ritonavir, or any of the ingredients in PAXLOVID  You are taking any of the following medicines:  - Alfuzosin  - Pethidine, piroxicam, propoxyphene  - Ranolazine  - Amiodarone, dronedarone, flecainide, propafenone, quinidine  - Colchicine  - Lurasidone, pimozide, clozapine  - Dihydroergotamine, ergotamine, methylergonovine  - Lovastatin, simvastatin  - Sildenafil (Revatio®) for pulmonary arterial hypertension (PAH)  - Triazolam, oral midazolam  - Apalutamide  - Carbamazepine, phenobarbital, phenytoin  - Rifampin  - St  Mohits Wort (hypericum perforatum)    What are the important possible side effects of PAXLOVID? Possible side effects of PAXLOVID are:  - Liver Problems  Tell your healthcare provider right away if you have any of these signs and symptoms of liver problems: loss of appetite, yellowing of your skin and the whites of eyes (jaundice), dark-colored urine, pale colored stools and itchy skin, stomach area (abdominal) pain  - Resistance to HIV Medicines  If you have untreated HIV infection, PAXLOVID may lead to some HIV medicines not working as well in the future  - Other possible side effects include: altered sense of taste, diarrhea, high blood pressure, or muscle aches    These are not all the possible side effects of PAXLOVID  Not many people have taken PAXLOVID  Serious and unexpected side effects may happen  Keagan Vargas is still being studied, so it is possible that all of the risks are not known at this time  What other treatment choices are there?   Like [place] may allow for the emergency use of other medicines to treat people with COVID-19  Go to https://[employer]/ for information on the emergency use of other medicines that are authorized by FDA to treat people with COVID-19  Your healthcare provider may talk with you about clinical trials for which you may be eligible  It is your choice to be treated or not to be treated with PAXLOVID  Should you decide not to receive it or for your child not to receive it, it will not change your standard medical care  What if I am pregnant or breastfeeding? There is no experience treating pregnant women or breastfeeding mothers with PAXLOVID  For a mother and unborn baby, the benefit of taking PAXLOVID may be greater than the risk from the treatment  If you are pregnant, discuss your options and specific situation with your healthcare provider  It is recommended that you use effective barrier contraception or do not have sexual activity while taking PAXLOVID  If you are breastfeeding, discuss your options and specific situation with your healthcare provider  How do I report side effects with PAXLOVID? Contact your healthcare provider if you have any side effects that bother you or do not go away  Report side effects to FDA MedWatch at www fda gov/medwatch or call 4-260-WMI5365 or you can report side effects to [employer]  at the contact information provided below  Website Fax number Telephone number   [place] [phone]     How should I store Lawanda Horta? Store PAXLOVID tablets at room temperature between 68°F to 77°F (20°C to 25°C)  Full fact sheet for patients, parents, and caregivers can be found at: [employer]    I have spent 15 minutes directly with the patient   Greater than 50% of this time was spent in counseling/coordination of care regarding: diagnostic results, prognosis, risks and benefits of treatment options, instructions for management, patient and family education, importance of treatment compliance, risk factor reductions and impressions  Encounter provider VARGAS Barron    Provider located at [address] [id number]    Recent Visits  No visits were found meeting these conditions  Showing recent visits within past 7 days and meeting all other requirements  Today's Visits  Date Type Provider Dept   08/08/22 Telemedicine Indra Fernandez, [address]  [facility]  today's visits and meeting all other requirements  Future Appointments  No visits were found meeting these conditions  Showing future appointments within next 150 days and meeting all other requirements     This virtual check-in was done via telephone and he agrees to proceed  Patient agrees to participate in a virtual check in via telephone or video visit instead of presenting to the office to address urgent/immediate medical needs  Patient is aware this is a billable service  After connecting through Telephone, the patient was identified by name and date of birth  Shary Koyanagi was informed that this was a telemedicine visit and that the exam was being conducted confidentially over secure lines  My office door was closed  No one else was in the room  Shary Koyanagi acknowledged consent and understanding of privacy and security of the telemedicine visit  I informed the patient that I have reviewed his record in Epic and presented the opportunity for him to ask any questions regarding the visit today  The patient agreed to participate  Verification of patient location:  Patient is located in the following state in which I hold an active license: [place]    Subjective: Shary Koyanagi is a 82 y o  male who is concerned about COVID-19  Patient's symptoms include fever, fatigue, cough and headache  - Date of symptom onset: 8/8/2022      COVID-19 vaccination status: Fully vaccinated with booster    Exposure:   Contact with a person who is under investigation (PUI) for or who is positive for COVID-19 within the last 14 days?: Yes    Hospitalized recently for fever and/or lower respiratory symptoms?: No      Currently a healthcare worker that is involved in direct patient care?: No      Works in a special setting where the risk of COVID-19 transmission may be high? (this may include long-term care, correctional and senior living facilities; homeless shelters; assisted-living facilities and group homes ): No      Resident in a special setting where the risk of COVID-19 transmission may be high? (this may include long-term care, correctional and senior living facilities; homeless shelters; assisted-living facilities and group homes ): No      No results found for: June Shirley, [address] [facility], [address] [facility], [facility] [address], [address] [facility], [address] [facility]  Past Medical History:   Diagnosis Date    Anemia 12/10/2012    BPH with obstruction/lower urinary tract symptoms     Cataract     Frequency of micturition     Hypercholesteremia     Memory loss     Poor urinary stream      Past Surgical History:   Procedure Laterality Date    CATARACT EXTRACTION Bilateral 02/2019, 03/2019    HERNIA REPAIR       Current Outpatient Medications   Medication Sig Dispense Refill    amLODIPine (NORVASC) 5 mg tablet 5 mg daily   0    betamethasone dipropionate (DIPROSONE) 0 05 % cream Apply topically 2 (two) times a day 30 g 0    Calcium Carbonate-Vitamin D 600-400 MG-UNIT per tablet Take by mouth      coenzyme Q-10 100 MG capsule Take 1 capsule by mouth daily        donepezil (ARICEPT) 5 mg tablet Take 1 tablet (5 mg total) by mouth daily at bedtime 30 tablet 5    Eliquis 5 MG Take 5 mg by mouth 2 (two) times a day      Lecithin 1200 MG CAPS Take by mouth daily      metoprolol tartrate (LOPRESSOR) 25 mg tablet Take 1 tablet (25 mg total) by mouth every 12 (twelve) hours 180 tablet 1    Multiple Vitamins-Minerals (CENTRUM ADULTS PO) Take 1 tablet by mouth daily      nirmatrelvir & ritonavir (Paxlovid) tablet therapy pack Take 2 tablets by mouth 2 (two) times a day for 5 days Take 1 nirmatrelvir tablet + 1 ritonavir tablet together per dose 20 tablet 0    Omega-3 1000 MG CAPS Take 1 capsule by mouth 2 (two) times a day        ramipril (ALTACE) 10 MG capsule Take by mouth daily        simvastatin (ZOCOR) 40 mg tablet Take 1 tablet (40 mg total) by mouth daily 90 tablet 3    tadalafil (CIALIS) 20 MG tablet Take 1 tablet (20 mg total) by mouth daily as needed for erectile dysfunction 30 tablet 3    ketoconazole (NIZORAL) 2 % cream apply topically to affected area OF FEET BETWEEN TOES TWICE DAILY FOR 6-8 WEEKS (Patient not taking: No sig reported)       No current facility-administered medications for this visit  No Known Allergies    Review of Systems   Constitutional: Positive for fatigue and fever  Respiratory: Positive for cough  Neurological: Positive for headaches  Objective: There were no vitals filed for this visit  Physical Exam  Vitals and nursing note reviewed  Constitutional:       General: He is awake  Appearance: Normal appearance  He is well-developed and normal weight  HENT:      Head: Normocephalic and atraumatic  Comments: Post nasal drip and cough      Nose: Nose normal       Mouth/Throat:      Mouth: Mucous membranes are moist    Eyes:      Conjunctiva/sclera: Conjunctivae normal    Cardiovascular:      Rate and Rhythm: Normal rate and regular rhythm  Pulses: Normal pulses  Heart sounds: Normal heart sounds  No murmur heard  Pulmonary:      Effort: Pulmonary effort is normal  No respiratory distress  Breath sounds: Normal breath sounds  Abdominal:      General: Bowel sounds are normal       Palpations: Abdomen is soft  Tenderness: There is no abdominal tenderness  Musculoskeletal:      Cervical back: Neck supple  Right lower leg: No edema  Left lower leg: No edema  Skin:     General: Skin is warm and dry  Neurological:      Mental Status: He is alert and oriented to person, place, and time  Psychiatric:         Attention and Perception: Attention normal          Mood and Affect: Mood normal          Speech: Speech normal          Behavior: Behavior normal  Behavior is cooperative  VIRTUAL VISIT DISCLAIMER    Kishor Alex verbally agrees to participate in [employer]  Pt is aware that [employer] could be limited without vital signs or the ability to perform a full hands-on physical exam  Norman Ramos understands he or the provider may request at any time to terminate the video visit and request the patient to seek care or treatment in person

## 2022-08-08 NOTE — PATIENT INSTRUCTIONS
Problem List Items Addressed This Visit          Other    COVID-19 virus detected - Primary     2022    Pt was tested for covid at home today and it was positive, wife states that pt had a fever this am of 100  She gave him meds and tried to bring down the temp  She did not have his current temp as of rooming the pt states he feels warm,   Co scratchy throat, tired, sweaty, no headaches, no body aches, has taste and smell  Vaccinated: yes, due for booster  Discussed Risks and Benefits of Vaccination   S/S started: 2022  Positive sick contacts: unknown  COVID-19 test on: 2022  Positive for: fever this am of 100  She gave him meds and tried to bring down the temp  She did not have his current temp as of rooming the pt states he feels warm,   Co scratchy throat, tired, sweaty, no headaches, no body aches, has taste and smell  Denies the following: loss of taste and smell  Discussed isolation/quarantine until 24 hours after all symptoms have stopped (without fever reducing medications)  Temperature of Less Than 100 degrees for a Minimum of 24 Hours  New CDC guidelines for Returning to Work:   If your test is positive for COVID-19 you should stay Isolated for at least 5 days since you first has symptoms prior to returning to work  If your test is positive for COVID-19 you should stay Isolated for at least 10 days since you first has symptoms prior to returning to work  Discussed keeping active to prevent blood clots - do not just lay around - get up and walk around   Discussed deep breathing exercises, reposition yourself often   Discussed Proning: To Increase Oxygen Saturation and Improve Breathin Steps - Repeated   1  Marylou Face onto your stomach - position your head in whatever position is comfortable to breath (you may use pillows to prop)  30 minutes to 2 hours  2  Switch to Lying on your Right Side   30 minutes to 2 hours  3   Switch to Sitting Upright (most comfortable for you)  30 minutes to 2 hours  4  Switch to Lying on your Left Side   30 minutes to 2 hours  Reminded to change toothbrush to prevent re-contamination  Start taking Vitamin C 1000mg by mouth daily  Start taking Vitamin D 2000mg by mouth daily  Start taking Zinc 220mg by mouth daily OR a multivitamin daily   Transmission is by respiratory secretions/Isolation Guidelines:  Keep a Distance of at [employer] 6 Feet  Do Not Go Out In Public  Use a Separate Bathroom or Clean with a Disinfectant (Clorox) after each use  Do Not Share: Dishes, Cups, Silverware, Towels, Bedding  Cover your Mouth and Nose AT [facility] Your Hands Often (Even After Using Hand )  Signs and Symptoms May Show Up 2 to 14 Days AFTER You have Been Exposed - This allows for this Virus to Spread! Fever, Cough, Shortness of Breath, Nasal and Chest Congestion   Chills, Diarrhea, Nausea, Vomiting, Abdominal Pain  Muscle Aches, Headaches, Dizziness  Post Nasal Drip, Sore Throat, Loss of Taste and/or Smell  Some or All of These Signs and Symptoms Can Last For Days, Weeks, or Even 3 to 6 Months and Longer  Monoclonal Antibody Infusion Treatment: This treatment has been granted FDA Emergency Use Authorization (EUA) for the treatment of mild to moderate COVID-19 disease  Our Supply is Very Limited  Current Recommendations is to have this Administered within 5 days of the Onset of Symptoms  It is only being approved for patient's who have NOT been Vaccinated  STOP SIMVASTATIN at least 12 hours prior to initiation of Paxlovid, during the 5 days of Paxlovid treatment and for 5 days after completing Paxlovid  Reduce dose of AMLODIPINE in HALF if patient has low blood pressure  APIXABAN (ELIQUIS) 5mg BID: Reduce dose by 50% - TAKE 2 5MG TWICE A DAY DURING THE TIME YOU ARE TAKING THE PAXLOVID, AND FOR 3 days after YOU FINISH THE Paxlovid           Relevant Medications    nirmatrelvir & ritonavir (Paxlovid) tablet therapy pack

## 2022-08-08 NOTE — ASSESSMENT & PLAN NOTE
2022    Pt was tested for covid at home today and it was positive, wife states that pt had a fever this am of 100  She gave him meds and tried to bring down the temp   She did not have his current temp as of rooming the pt states he feels warm,   Co scratchy throat, tired, sweaty, no headaches, no body aches, has taste and smell  Vaccinated: yes, due for booster  Discussed Risks and Benefits of Vaccination   S/S started: 2022  Positive sick contacts: unknown  COVID-19 test on: 2022  Positive for: fever this am of 100  She gave him meds and tried to bring down the temp   She did not have his current temp as of rooming the pt states he feels warm,   Co scratchy throat, tired, sweaty, no headaches, no body aches, has taste and smell  Denies the following: loss of taste and smell  Discussed isolation/quarantine until 24 hours after all symptoms have stopped (without fever reducing medications)  Temperature of Less Than 100 degrees for a Minimum of 24 Hours  New CDC guidelines for Returning to Work:   If your test is positive for COVID-19 you should stay Isolated for at least 5 days since you first has symptoms prior to returning to work  If your test is positive for COVID-19 you should stay Isolated for at least 10 days since you first has symptoms prior to returning to work  Discussed keeping active to prevent blood clots - do not just lay around - get up and walk around   Discussed deep breathing exercises, reposition yourself often   Discussed Proning: To Increase Oxygen Saturation and Improve Breathin Steps - Repeated   1  [place] onto your stomach - position your head in whatever position is comfortable to breath (you may use pillows to prop)  30 minutes to 2 hours  2  Switch to Lying on your Right Side   30 minutes to 2 hours  3  Switch to Sitting Upright (most comfortable for you)  30 minutes to 2 hours  4   Switch to Lying on your Left Side   30 minutes to 2 hours  Reminded to change toothbrush to prevent re-contamination  Start taking Vitamin C 1000mg by mouth daily  Start taking Vitamin D 2000mg by mouth daily  Start taking Zinc 220mg by mouth daily OR a multivitamin daily   Transmission is by respiratory secretions/Isolation Guidelines:  Keep a Distance of at [employer] 6 Feet  Do Not Go Out In Public  Use a Separate Bathroom or Clean with a Disinfectant (Clorox) after each use  Do Not Share: Dishes, Cups, Silverware, Towels, Bedding  [facility] your M[facility] and Nose [facility] Often (Even After Using Hand )  Signs and Symptoms May Show Up 2 to 14 Days AFTER You have Been Exposed - This allows for this Virus to Spread! Fever, Cough, Shortness of Breath, Nasal and Chest Congestion   Chills, Diarrhea, Nausea, Vomiting, Abdominal Pain  Muscle Aches, Headaches, Dizziness  Post Nasal Drip, Sore Throat, Loss of Taste and/or Smell  Some or All of These Signs and Symptoms Can Last For Days, Weeks, or Even 3 to 6 Months and Longer  Monoclonal Antibody Infusion Treatment: This treatment has been granted FDA Emergency Use Authorization (EUA) for the treatment of mild to moderate COVID-19 disease  Our Supply is Very Limited  Current Recommendations is to have this Administered within 5 days of the Onset of Symptoms  It is only being approved for patient's who have NOT been Vaccinated  STOP SIMVASTATIN at least 12 hours prior to initiation of Paxlovid, during the 5 days of Paxlovid treatment and for 5 days after completing Paxlovid  · Reduce dose of AMLODIPINE in HALF if patient has low blood pressure  · APIXABAN (ELIQUIS) 5mg BID: Reduce dose by 50% - TAKE 2 5MG TWICE A DAY DURING THE TIME YOU ARE TAKING THE PAXLOVID, AND FOR 3 days after YOU FINISH THE Paxlovid

## 2022-08-31 DIAGNOSIS — R41.3 SHORT-TERM MEMORY LOSS: ICD-10-CM

## 2022-08-31 RX ORDER — DONEPEZIL HYDROCHLORIDE 5 MG/1
5 TABLET, FILM COATED ORAL
Qty: 30 TABLET | Refills: 5 | Status: SHIPPED | OUTPATIENT
Start: 2022-08-31

## 2022-09-21 ENCOUNTER — OFFICE VISIT (OUTPATIENT)
Dept: INTERNAL MEDICINE CLINIC | Facility: CLINIC | Age: 87
End: 2022-09-21
Payer: MEDICARE

## 2022-09-21 VITALS
DIASTOLIC BLOOD PRESSURE: 78 MMHG | WEIGHT: 190 LBS | SYSTOLIC BLOOD PRESSURE: 116 MMHG | TEMPERATURE: 97.5 F | HEIGHT: 70 IN | OXYGEN SATURATION: 98 % | BODY MASS INDEX: 27.2 KG/M2 | HEART RATE: 81 BPM

## 2022-09-21 DIAGNOSIS — G30.1 LATE ONSET ALZHEIMER'S DEMENTIA WITHOUT BEHAVIORAL DISTURBANCE (HCC): ICD-10-CM

## 2022-09-21 DIAGNOSIS — E78.00 PURE HYPERCHOLESTEROLEMIA: ICD-10-CM

## 2022-09-21 DIAGNOSIS — F02.80 LATE ONSET ALZHEIMER'S DEMENTIA WITHOUT BEHAVIORAL DISTURBANCE (HCC): ICD-10-CM

## 2022-09-21 DIAGNOSIS — I48.0 PAROXYSMAL ATRIAL FIBRILLATION (HCC): ICD-10-CM

## 2022-09-21 DIAGNOSIS — I10 BENIGN ESSENTIAL HYPERTENSION: ICD-10-CM

## 2022-09-21 DIAGNOSIS — G47.33 SLEEP APNEA, OBSTRUCTIVE: ICD-10-CM

## 2022-09-21 DIAGNOSIS — Z23 ENCOUNTER FOR ADMINISTRATION OF VACCINE: Primary | ICD-10-CM

## 2022-09-21 PROCEDURE — 90662 IIV NO PRSV INCREASED AG IM: CPT

## 2022-09-21 PROCEDURE — 99214 OFFICE O/P EST MOD 30 MIN: CPT

## 2022-09-21 PROCEDURE — G0008 ADMIN INFLUENZA VIRUS VAC: HCPCS

## 2022-09-21 NOTE — PROGRESS NOTES
Assessment/Plan:    Problem List Items Addressed This Visit        Respiratory    Sleep apnea, obstructive     Tolerating CPAP at the current settings and notes compliance            Cardiovascular and Mediastinum    Benign essential hypertension      Good BP and no other issues with medication         Paroxysmal atrial fibrillation (HCC)     The patient is doing well with Eliquis            Nervous and Auditory    Late onset Alzheimer's dementia without behavioral disturbance ([facility] [address] )     The patient requested that the patient stay on Aricept 5 mg QDay            Other    Pure hypercholesterolemia     Cholesterol was noted to be good  Other Visit Diagnoses     Encounter for administration of vaccine    -  Primary    Relevant Orders    influenza vaccine, high-dose, PF 0 7 mL (FLUZONE HIGH-DOSE) (Completed)           Diagnoses and all orders for this visit:    Encounter for administration of vaccine  -     influenza vaccine, high-dose, PF 0 7 mL (FLUZONE HIGH-DOSE)    Sleep apnea, obstructive    Benign essential hypertension    Pure hypercholesterolemia    Paroxysmal atrial fibrillation ([facility] [address] )    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)      Sleep apnea, obstructive  Tolerating CPAP at the current settings and notes compliance    Paroxysmal atrial fibrillation ([facility] [address] )  The patient is doing well with Eliquis    Benign essential hypertension   Good BP and no other issues with medication    Pure hypercholesterolemia  Cholesterol was noted to be good  Late onset Alzheimer's dementia without behavioral disturbance [facility])  The patient requested that the patient stay on Aricept 5 mg QDay        Subjective:      Patient ID: Sterling Hilliard is a 82 y o  male  The patient is doing well  He has been using the CPAP on all nights ad tolerates the settings  He notes that it can be a problem when he gets up to urinate in the early AM   The patient is not interested in inspire    The patient's wife notes some decline in memory in the PM and expressed interest in additional doses of Aricept in the PM   The patient continue to drive and do well and does not feel that he needs an increased dose  The following portions of the patient's history were reviewed and updated as appropriate:   He has a past medical history of Anemia (12/10/2012), BPH with obstruction/lower urinary tract symptoms, Cataract, Frequency of micturition, Hypercholesteremia, Memory loss, and Poor urinary stream ,  does not have any pertinent problems on file  ,   has a past surgical history that includes Hernia repair and Cataract extraction (Bilateral, 02/2019, 03/2019)  ,  family history includes Colon cancer in his family; Coronary artery disease in his mother; No Known Problems in his father  ,   reports that he has never smoked  He has never used smokeless tobacco  He reports current alcohol use of about 1 0 - 2 0 standard drink of alcohol per week  He reports that he does not use drugs  ,  has No Known Allergies     Current Outpatient Medications   Medication Sig Dispense Refill    amLODIPine (NORVASC) 5 mg tablet 5 mg daily   0    betamethasone dipropionate (DIPROSONE) 0 05 % cream Apply topically 2 (two) times a day (Patient taking differently: Apply 1 application topically if needed) 30 g 0    Calcium Carbonate-Vitamin D 600-400 MG-UNIT per tablet Take 1 tablet by mouth daily      coenzyme Q-10 100 MG capsule Take 1 capsule by mouth daily        donepezil (ARICEPT) 5 mg tablet Take 1 tablet (5 mg total) by mouth daily at bedtime 30 tablet 5    Eliquis 5 MG Take 5 mg by mouth 2 (two) times a day      ketoconazole (NIZORAL) 2 % cream Apply 1 application topically if needed      Lecithin 1200 MG CAPS Take by mouth daily      metoprolol tartrate (LOPRESSOR) 25 mg tablet Take 1 tablet (25 mg total) by mouth every 12 (twelve) hours 180 tablet 1    Multiple Vitamins-Minerals (CENTRUM ADULTS PO) Take 1 tablet by mouth daily  Omega-3 1000 MG CAPS Take 1 capsule by mouth 2 (two) times a day        ramipril (ALTACE) 10 MG capsule Take by mouth daily        simvastatin (ZOCOR) 40 mg tablet Take 1 tablet (40 mg total) by mouth daily 90 tablet 3    tadalafil (CIALIS) 20 MG tablet Take 1 tablet (20 mg total) by mouth daily as needed for erectile dysfunction 30 tablet 3     No current facility-administered medications for this visit  Review of Systems   Constitutional: Negative for chills, fatigue and fever  HENT: Negative  Respiratory: Negative for cough, chest tightness and shortness of breath  Cardiovascular: Negative for chest pain and palpitations  Gastrointestinal: Negative for abdominal pain, constipation, diarrhea, nausea and vomiting  Genitourinary: Negative  Musculoskeletal: Negative for back pain and myalgias  Skin: Negative  Neurological: Negative  Psychiatric/Behavioral: Negative for dysphoric mood  The patient is not nervous/anxious  Objective:  Vitals:    09/21/22 0946   BP: 116/78   Pulse: 81   Temp: 97 5 °F (36 4 °C)   TempSrc: Tympanic   SpO2: 98%   Weight: 86 2 kg (190 lb)   Height: 5' 10" (1 778 m)     Body mass index is 27 26 kg/m²  Physical Exam  Vitals and nursing note reviewed  Constitutional:       Appearance: He is well-developed  HENT:      Head: Normocephalic and atraumatic  Eyes:      Pupils: Pupils are equal, round, and reactive to light  Cardiovascular:      Rate and Rhythm: Normal rate and regular rhythm  Heart sounds: Normal heart sounds  No murmur heard  Pulmonary:      Effort: Pulmonary effort is normal       Breath sounds: Normal breath sounds  No stridor  No rales  Abdominal:      General: Bowel sounds are normal  There is no distension  Palpations: Abdomen is soft  Tenderness: There is no abdominal tenderness  Musculoskeletal:         General: No deformity  Normal range of motion        Cervical back: Normal range of motion and neck supple  Skin:     General: Skin is warm and dry  Neurological:      Mental Status: He is alert and oriented to person, place, and time            PHQ-2/9 Depression Screening    Little interest or pleasure in doing things: 0 - not at all  Feeling down, depressed, or hopeless: 0 - not at all  PHQ-2 Score: 0  PHQ-2 Interpretation: Negative depression screen

## 2022-09-29 NOTE — ASSESSMENT & PLAN NOTE
Retinal tear and detachment warning symptoms reviewed and patient instructed to call immediately if increasing floaters, flashes, or decreasing peripheral vision. The patient reports he is doing well with Cialis 20 mg  He will call when he needs a refill prescription

## 2022-10-02 DIAGNOSIS — I48.0 PAROXYSMAL ATRIAL FIBRILLATION (HCC): ICD-10-CM

## 2022-12-07 ENCOUNTER — PATIENT MESSAGE (OUTPATIENT)
Dept: UROLOGY | Facility: MEDICAL CENTER | Age: 87
End: 2022-12-07

## 2022-12-08 ENCOUNTER — PATIENT MESSAGE (OUTPATIENT)
Dept: UROLOGY | Facility: MEDICAL CENTER | Age: 87
End: 2022-12-08

## 2022-12-08 DIAGNOSIS — N52.9 ERECTILE DYSFUNCTION, UNSPECIFIED ERECTILE DYSFUNCTION TYPE: ICD-10-CM

## 2022-12-09 RX ORDER — TADALAFIL 20 MG/1
20 TABLET ORAL DAILY PRN
Qty: 30 TABLET | Refills: 3 | Status: SHIPPED | OUTPATIENT
Start: 2022-12-09

## 2023-01-09 ENCOUNTER — APPOINTMENT (OUTPATIENT)
Dept: LAB | Facility: CLINIC | Age: 88
End: 2023-01-09

## 2023-01-09 DIAGNOSIS — R97.20 ELEVATED PSA: ICD-10-CM

## 2023-01-09 LAB — PSA SERPL-MCNC: 5.7 NG/ML (ref 0–4)

## 2023-01-13 ENCOUNTER — TELEPHONE (OUTPATIENT)
Dept: INTERNAL MEDICINE CLINIC | Facility: CLINIC | Age: 88
End: 2023-01-13

## 2023-01-13 DIAGNOSIS — R41.3 SHORT-TERM MEMORY LOSS: ICD-10-CM

## 2023-01-13 RX ORDER — DONEPEZIL HYDROCHLORIDE 10 MG/1
10 TABLET, FILM COATED ORAL
Qty: 30 TABLET | Refills: 0 | Status: SHIPPED | OUTPATIENT
Start: 2023-01-13 | End: 2023-02-16 | Stop reason: SDUPTHER

## 2023-01-13 NOTE — TELEPHONE ENCOUNTER
Pt's wife called and is asking for an increase in his aricept  She is seeing differences in him  He doesn't have an apt until March with you

## 2023-01-13 NOTE — PROGRESS NOTES
Assessment/Plan:    Problem List Items Addressed This Visit    None  Visit Diagnoses     Short-term memory loss        Relevant Medications    donepezil (ARICEPT) 10 mg tablet           Diagnoses and all orders for this visit:    Short-term memory loss  -     donepezil (ARICEPT) 10 mg tablet; Take 1 tablet (10 mg total) by mouth daily at bedtime      No problem-specific Assessment & Plan notes found for this encounter. Subjective:      Patient ID: Esequiel Correa is a 82 y.o. male. HPI    The following portions of the patient's history were reviewed and updated as appropriate:   He has a past medical history of Anemia (12/10/2012), BPH with obstruction/lower urinary tract symptoms, Cataract, Frequency of micturition, Hypercholesteremia, Memory loss, and Poor urinary stream.,  does not have any pertinent problems on file. ,   has a past surgical history that includes Hernia repair and Cataract extraction (Bilateral, 02/2019, 03/2019). ,  family history includes Colon cancer in his family; Coronary artery disease in his mother; No Known Problems in his father. ,   reports that he has never smoked. He has never used smokeless tobacco. He reports current alcohol use of about 1.0 - 2.0 standard drink per week. He reports that he does not use drugs. ,  has No Known Allergies. .  Current Outpatient Medications   Medication Sig Dispense Refill   • donepezil (ARICEPT) 10 mg tablet Take 1 tablet (10 mg total) by mouth daily at bedtime 30 tablet 0   • amLODIPine (NORVASC) 5 mg tablet 5 mg daily   0   • betamethasone dipropionate (DIPROSONE) 0.05 % cream Apply topically 2 (two) times a day (Patient taking differently: Apply 1 application topically if needed) 30 g 0   • Calcium Carbonate-Vitamin D 600-400 MG-UNIT per tablet Take 1 tablet by mouth daily     • coenzyme Q-10 100 MG capsule Take 1 capsule by mouth daily       • Eliquis 5 MG Take 5 mg by mouth 2 (two) times a day     • ketoconazole (NIZORAL) 2 % cream Apply 1 application topically if needed     • Lecithin 1200 MG CAPS Take by mouth daily     • metoprolol tartrate (LOPRESSOR) 25 mg tablet take 1 tablet by mouth every 12 hours 180 tablet 1   • Multiple Vitamins-Minerals (CENTRUM ADULTS PO) Take 1 tablet by mouth daily     • Omega-3 1000 MG CAPS Take 1 capsule by mouth 2 (two) times a day       • ramipril (ALTACE) 10 MG capsule Take by mouth daily       • simvastatin (ZOCOR) 40 mg tablet Take 1 tablet (40 mg total) by mouth daily 90 tablet 3   • tadalafil (CIALIS) 20 MG tablet Take 1 tablet (20 mg total) by mouth daily as needed for erectile dysfunction 30 tablet 3     No current facility-administered medications for this visit. Review of Systems      Objective: There were no vitals filed for this visit. There is no height or weight on file to calculate BMI.      Physical Exam     PHQ-2/9 Depression Screening

## 2023-01-27 ENCOUNTER — OFFICE VISIT (OUTPATIENT)
Dept: UROLOGY | Facility: MEDICAL CENTER | Age: 88
End: 2023-01-27

## 2023-01-27 VITALS
HEART RATE: 64 BPM | WEIGHT: 192 LBS | BODY MASS INDEX: 27.49 KG/M2 | HEIGHT: 70 IN | SYSTOLIC BLOOD PRESSURE: 122 MMHG | OXYGEN SATURATION: 99 % | DIASTOLIC BLOOD PRESSURE: 82 MMHG

## 2023-01-27 DIAGNOSIS — R97.20 ELEVATED PSA: ICD-10-CM

## 2023-01-27 DIAGNOSIS — N40.1 BPH WITH OBSTRUCTION/LOWER URINARY TRACT SYMPTOMS: Primary | ICD-10-CM

## 2023-01-27 DIAGNOSIS — N13.8 BPH WITH OBSTRUCTION/LOWER URINARY TRACT SYMPTOMS: Primary | ICD-10-CM

## 2023-01-27 LAB
POST-VOID RESIDUAL VOLUME, ML POC: 0 ML
SL AMB  POCT GLUCOSE, UA: NORMAL
SL AMB LEUKOCYTE ESTERASE,UA: NORMAL
SL AMB POCT BILIRUBIN,UA: NORMAL
SL AMB POCT BLOOD,UA: NORMAL
SL AMB POCT CLARITY,UA: CLEAR
SL AMB POCT COLOR,UA: YELLOW
SL AMB POCT KETONES,UA: NORMAL
SL AMB POCT NITRITE,UA: NORMAL
SL AMB POCT PH,UA: 6
SL AMB POCT SPECIFIC GRAVITY,UA: 1.01
SL AMB POCT URINE PROTEIN: NORMAL
SL AMB POCT UROBILINOGEN: 0.2

## 2023-01-27 NOTE — PROGRESS NOTES
1/27/2023      Chief Complaint   Patient presents with   • Elevated PSA   • Benign Prostatic Hypertrophy     Assessment and Plan    82 y o  male managed by Dr Chadwick Newman  1   Benign prostatic hyperplasia with lower urinary tract symptoms  · PSA performed 1/9/2023 resulted 5 7  · RAQUEL performed prior office evaluation  · Repeat PSA and RAQUEL in 6 months  · If there is a vast increase in PSA consider MRI of prostate  · Follow-up in the office in 6 months    2  Erectile dysfunction  · Continue tadalafil    History of Present Illness  [facility] 82 [facility]  [facility] here for follow up evaluation of  urinary symptoms secondary to benign prostatic hyperplasia   Patient also has a history of erectile dysfunction for which he has been managed with tadalafil with good effects and minimal side effects    Patient does have a most recent PSA performed 08/03/2021 resulting 6 4   PSA trend below   Patient currently denies all lower urinary tract symptoms   He is currently satisfied with his urinary symptoms   He reports sensation of complete bladder emptying with urination   He denies changes to his general health since prior office evaluation  Component       PSA, Total   Latest Ref Rng & Units       0 0 - 4 0 ng/mL   2/19/2018      8:11 AM 3 2   4/3/2019      8:13 AM 4 8 (H)   5/10/2019      2:11 PM 3 4   8/19/2020      8:52 AM 3 5   8/3/2021      9:56 AM 6 4 (H)   11/5/2021      10:03 AM 4 5 (H)   5/31/2022      2:58 PM 5 2 (H)   1/9/2023      11:40 AM 5 7 (H)       Review of Systems   Constitutional: Negative for chills and fever  Respiratory: Negative for cough and shortness of breath  Cardiovascular: Negative for chest pain  Gastrointestinal: Negative for abdominal distention, abdominal pain, blood in stool, nausea and vomiting  Genitourinary: Negative for difficulty urinating, dysuria, enuresis, flank pain, frequency, hematuria and urgency  Skin: Negative for rash             [address] SYMPTOM SCORE    Flowsheet Row Most Recent Value   AUA SYMPTOM SCORE    How often have you had a sensation of not emptying your bladder completely after you finished urinating? 1   How often have you had to urinate again less than two hours after you finished urinating? 3   How often have you found you stopped and started again several times when you urinate? 2   How often have you found it difficult to postpone urination? 2   How often have you had a weak urinary stream? 1   How often have you had to push or strain to begin urination? 1   How many times did you most typically get up to urinate from the time you went to bed at night until the time you got up in the morning? 1   Quality of Life: If you were to spend the rest of your life with your urinary condition just the way it is now, how would you feel about that? 2   AUA SYMPTOM SCORE 11             Past Medical History  Past Medical History:   Diagnosis Date   • Anemia 12/10/2012   • BPH with obstruction/lower urinary tract symptoms    • Cataract    • Frequency of micturition    • Hypercholesteremia    • Memory loss    • Poor urinary stream        Past Social History  Past Surgical History:   Procedure Laterality Date   • CATARACT EXTRACTION Bilateral 02/2019, 03/2019   • HERNIA REPAIR       Social History     Tobacco Use   Smoking Status Never   Smokeless Tobacco Never       Past Family History  Family History   Problem Relation Age of Onset   • Coronary artery disease Mother    • No Known Problems Father    • Colon cancer Family        Past Social history  Social History     Socioeconomic History   • Marital status: /Civil Union     Spouse name: Not on file   • Number of children: Not on file   • Years of education: Not on file   • Highest education level: Not on file   Occupational History   • Occupation: RETIRED   Tobacco Use   • Smoking status: Never   • Smokeless tobacco: Never   Vaping Use   • Vaping Use: Never used   Substance and Sexual Activity   • Alcohol use:  Yes Alcohol/week: 1 0 - 2 0 standard drink     Types: 1 - 2 Glasses of wine per week     Comment: weekly   • Drug use: No   • Sexual activity: Not on file   Other Topics Concern   • Not on file   Social History Narrative        RETIRED     Social Determinants of Health     Financial Resource Strain: Not on file   Food Insecurity: Not on file   Transportation Needs: Not on file   Physical Activity: Not on file   Stress: Not on file   Social Connections: Not on file   Intimate Partner Violence: Not on file   Housing Stability: Not on file       Current Medications  Current Outpatient Medications   Medication Sig Dispense Refill   • amLODIPine (NORVASC) 5 mg tablet 5 mg daily   0   • Calcium Carbonate-Vitamin D 600-400 MG-UNIT per tablet Take 1 tablet by mouth daily     • coenzyme Q-10 100 MG capsule Take 1 capsule by mouth daily       • donepezil (ARICEPT) 10 mg tablet Take 1 tablet (10 mg total) by mouth daily at bedtime 30 tablet 0   • Eliquis 5 MG Take 5 mg by mouth 2 (two) times a day     • metoprolol tartrate (LOPRESSOR) 25 mg tablet take 1 tablet by mouth every 12 hours 180 tablet 1   • Multiple Vitamins-Minerals (CENTRUM ADULTS PO) Take 1 tablet by mouth daily     • Omega-3 1000 MG CAPS Take 1 capsule by mouth 2 (two) times a day       • ramipril (ALTACE) 10 MG capsule Take by mouth daily       • simvastatin (ZOCOR) 40 mg tablet Take 1 tablet (40 mg total) by mouth daily 90 tablet 3   • betamethasone dipropionate (DIPROSONE) 0 05 % cream Apply topically 2 (two) times a day (Patient not taking: Reported on 1/27/2023) 30 g 0   • ketoconazole (NIZORAL) 2 % cream Apply 1 application topically if needed (Patient not taking: Reported on 1/27/2023)     • Lecithin 1200 MG CAPS Take by mouth daily (Patient not taking: Reported on 1/27/2023)     • tadalafil (CIALIS) 20 MG tablet Take 1 tablet (20 mg total) by mouth daily as needed for erectile dysfunction (Patient not taking: Reported on 1/27/2023) 30 tablet 3     No current facility-administered medications for this visit  Allergies  No Known Allergies      The following portions of the patient's history were reviewed and updated as appropriate: allergies, current medications, past medical history, past social history, past surgical history and problem list       Vitals  Vitals:    01/27/23 1028   BP: 122/82   BP Location: Left arm   Patient Position: Sitting   Cuff Size: Adult   Pulse: 64   SpO2: 99%   Weight: 87 1 kg (192 lb)   Height: 5' 10" (1 778 m)           Physical Exam  Physical Exam  Vitals reviewed  Constitutional:       General: He is not in acute distress  Appearance: Normal appearance  He is normal weight  HENT:      Head: Normocephalic  Pulmonary:      Effort: No respiratory distress  Breath sounds: Normal breath sounds  Skin:     General: Skin is warm and dry  Neurological:      General: No focal deficit present  Mental Status: He is alert and oriented to person, place, and time     Psychiatric:         Mood and Affect: Mood normal          Behavior: Behavior normal            Results  Recent Results (from the past 1 hour(s))   POCT Measure PVR    Collection Time: 01/27/23 10:34 AM   Result Value Ref Range    POST-VOID RESIDUAL VOLUME, ML POC 0 mL   POCT urine dip auto non-scope    Collection Time: 01/27/23 10:38 AM   Result Value Ref Range     COLOR,UA yellow     CLARITY,UA clear     SPECIFIC GRAVITY,UA 1 015      PH,UA 6 0     LEUKOCYTE ESTERASE,UA neg     NITRITE,UA neg     GLUCOSE, UA neg     KETONES,UA neg     BILIRUBIN,UA neg     BLOOD,UA neg     POCT URINE PROTEIN neg     SL AMB POCT UROBILINOGEN 0 2    ]  Lab Results   Component Value Date    PSA 5 7 (H) 01/09/2023    PSA 5 2 (H) 05/31/2022    PSA 4 5 (H) 11/05/2021     Lab Results   Component Value Date    GLUCOSE 88 03/06/2015    CALCIUM 9 4 12/14/2021     03/06/2015    K 3 7 12/14/2021    CO2 29 12/14/2021     12/14/2021    BUN 14 12/14/2021    CREATININE 1 04 12/14/2021     Lab Results   Component Value Date    WBC 5 79 12/14/2021    HGB 15 6 12/14/2021    HCT 46 8 12/14/2021    MCV 99 (H) 12/14/2021     12/14/2021           Orders  Orders Placed This Encounter   Procedures   • PSA Total, Diagnostic     Standing Status:   Future     Standing Expiration Date:   1/27/2024   • POCT urine dip auto non-scope   • POCT Measure PVR       VARGAS Cooper

## 2023-02-16 DIAGNOSIS — R41.3 SHORT-TERM MEMORY LOSS: ICD-10-CM

## 2023-02-16 RX ORDER — DONEPEZIL HYDROCHLORIDE 10 MG/1
10 TABLET, FILM COATED ORAL
Qty: 90 TABLET | Refills: 1 | Status: SHIPPED | OUTPATIENT
Start: 2023-02-16 | End: 2023-03-18

## 2023-03-15 ENCOUNTER — RA CDI HCC (OUTPATIENT)
Dept: OTHER | Facility: HOSPITAL | Age: 88
End: 2023-03-15

## 2023-03-15 NOTE — PROGRESS NOTES
[address]  coding opportunities       Chart reviewed, no opportunity found: CHART REVIEWED, NO OPPORTUNITY FOUND        Patients Insurance     Medicare Insurance: Medicare

## 2023-03-22 ENCOUNTER — OFFICE VISIT (OUTPATIENT)
Dept: INTERNAL MEDICINE CLINIC | Facility: CLINIC | Age: 88
End: 2023-03-22

## 2023-03-22 VITALS
RESPIRATION RATE: 14 BRPM | WEIGHT: 192.6 LBS | HEIGHT: 70 IN | TEMPERATURE: 97.4 F | BODY MASS INDEX: 27.57 KG/M2 | SYSTOLIC BLOOD PRESSURE: 144 MMHG | DIASTOLIC BLOOD PRESSURE: 78 MMHG | HEART RATE: 47 BPM | OXYGEN SATURATION: 95 %

## 2023-03-22 DIAGNOSIS — F02.80 LATE ONSET ALZHEIMER'S DEMENTIA WITHOUT BEHAVIORAL DISTURBANCE (HCC): ICD-10-CM

## 2023-03-22 DIAGNOSIS — I10 BENIGN ESSENTIAL HYPERTENSION: ICD-10-CM

## 2023-03-22 DIAGNOSIS — R41.3 SHORT-TERM MEMORY LOSS: ICD-10-CM

## 2023-03-22 DIAGNOSIS — G47.33 SLEEP APNEA, OBSTRUCTIVE: ICD-10-CM

## 2023-03-22 DIAGNOSIS — E78.00 PURE HYPERCHOLESTEROLEMIA: ICD-10-CM

## 2023-03-22 DIAGNOSIS — G30.1 LATE ONSET ALZHEIMER'S DEMENTIA WITHOUT BEHAVIORAL DISTURBANCE (HCC): ICD-10-CM

## 2023-03-22 DIAGNOSIS — Z12.5 SCREENING PSA (PROSTATE SPECIFIC ANTIGEN): ICD-10-CM

## 2023-03-22 DIAGNOSIS — I48.0 PAROXYSMAL ATRIAL FIBRILLATION (HCC): ICD-10-CM

## 2023-03-22 DIAGNOSIS — Z00.00 MEDICARE ANNUAL WELLNESS VISIT, SUBSEQUENT: Primary | ICD-10-CM

## 2023-03-22 NOTE — PATIENT INSTRUCTIONS
Medicare Preventive Visit Patient Instructions  Thank you for completing your Welcome to Medicare Visit or Medicare Annual Wellness Visit today  Your next wellness visit will be due in one year (3/22/2024)  The screening/preventive services that you may require over the next 5-10 years are detailed below  Some tests may not apply to you based off risk factors and/or age  Screening tests ordered at today's visit but not completed yet may show as past due  Also, please note that scanned in results may not display below  Preventive Screenings:  Service Recommendations Previous Testing/Comments   Colorectal Cancer Screening  · Colonoscopy    · Fecal Occult Blood Test (FOBT)/Fecal Immunochemical Test (FIT)  · Fecal DNA/Cologuard Test  · Flexible Sigmoidoscopy Age: 39-70 years old   Colonoscopy: every 10 years (May be performed more frequently if at higher risk)  OR  FOBT/FIT: every 1 year  OR  Cologuard: every 3 years  OR  Sigmoidoscopy: every 5 years  Screening may be recommended earlier than age 39 if at higher risk for colorectal cancer  Also, an individualized decision between you and your healthcare provider will decide whether screening between the ages of 74-80 would be appropriate   Colonoscopy: Not on file  FOBT/FIT: Not on file  Cologuard: Not on file  Sigmoidoscopy: Not on file    Screening Not Indicated     Prostate Cancer Screening Individualized decision between patient and health care provider in men between ages of 53-78   Medicare will cover every 12 months beginning on the day after your 50th birthday PSA: 5 7 ng/mL     Screening Not Indicated     Hepatitis C Screening Once for adults born between [facility]  More frequently in patients at high risk for Hepatitis C Hep C Antibody: Not on file        Diabetes Screening 1-2 times per year if you're at risk for diabetes or have pre-diabetes Fasting glucose: 100 mg/dL (7/20/2020)  A1C: No results in last 5 years (No results in last 5 years) Cholesterol Screening Once every 5 years if you don't have a lipid disorder  May order more often based on risk factors  Lipid panel: 07/20/2020  Screening Not Indicated  History Lipid Disorder      Other Preventive Screenings Covered by Medicare:  1  Abdominal Aortic Aneurysm (AAA) Screening: covered once if your at risk  You're considered to be at risk if you have a family history of AAA or a male between the age of 73-68 who smoking at least 100 cigarettes in your lifetime  2  Lung Cancer Screening: covers low dose CT scan once per year if you meet all of the following conditions: (1) Age 50-69; (2) No signs or symptoms of lung cancer; (3) Current smoker or have quit smoking within the last 15 years; (4) You have a tobacco smoking history of at least 20 pack years (packs per day x number of years you smoked); (5) You get a written order from a healthcare provider  3  Glaucoma Screening: covered annually if you're considered high risk: (1) You have diabetes OR (2) Family history of glaucoma OR (3)  aged 48 and older OR (3)  American aged 72 and older  3  Osteoporosis Screening: covered every 2 years if you meet one of the following conditions: (1) Have a vertebral abnormality; (2) On glucocorticoid therapy for more than 3 months; (3) Have primary hyperparathyroidism; (4) On osteoporosis medications and need to assess response to drug therapy  5  HIV Screening: covered annually if you're between the age of 12-76  Also covered annually if you are younger than 13 and older than 72 with risk factors for HIV infection  For pregnant patients, it is covered up to 3 times per pregnancy      Immunizations:  Immunization Recommendations   Influenza Vaccine Annual influenza vaccination during flu season is recommended for all persons aged >= 6 months who do not have contraindications   Pneumococcal Vaccine   * Pneumococcal conjugate vaccine = PCV13 (Prevnar 13), PCV15 (Vaxneuvance), PCV20 (Prevnar 20)  * Pneumococcal polysaccharide vaccine = PPSV23 (Pneumovax) Adults 2364 years old: 1-3 doses may be recommended based on certain risk factors  Adults 72 years old: 1-2 doses may be recommended based off what pneumonia vaccine you previously received   Hepatitis B Vaccine 3 dose series if at intermediate or high risk (ex: diabetes, end stage renal disease, liver disease)   Tetanus (Td) Vaccine - COST NOT COVERED BY MEDICARE PART B Following completion of primary series, a booster dose should be given every 10 years to maintain immunity against tetanus  Td may also be given as tetanus wound prophylaxis  Tdap Vaccine - COST NOT COVERED BY MEDICARE PART B Recommended at least once for all adults  For pregnant patients, recommended with each pregnancy  Shingles Vaccine (Shingrix) - COST NOT COVERED BY MEDICARE PART B  2 shot series recommended in those aged 48 and above     Health Maintenance Due:  There are no preventive care reminders to display for this patient  Immunizations Due:      Topic Date Due   • Pneumococcal Vaccine: 65+ Years (2 - PPSV23 if available, else PCV20) 04/05/2018   • COVID-19 Vaccine (4 - Booster for Moderna series) 01/28/2022     Advance Directives   What are advance directives? Advance directives are legal documents that state your wishes and plans for medical care  These plans are made ahead of time in case you lose your ability to make decisions for yourself  Advance directives can apply to any medical decision, such as the treatments you want, and if you want to donate organs  What are the types of advance directives? There are many types of advance directives, and each state has rules about how to use them  You may choose a combination of any of the following:  · Living will: This is a written record of the treatment you want  You can also choose which treatments you do not want, which to limit, and which to stop at a certain time   This includes surgery, medicine, IV fluid, and tube feedings  · Durable power of  for healthcare [facility]): This is a written record that states who you want to make healthcare choices for you when you are unable to make them for yourself  This person, called a proxy, is usually a family member or a friend  You may choose more than 1 proxy  · Do not resuscitate (DNR) order:  A DNR order is used in case your heart stops beating or you stop breathing  It is a request not to have certain forms of treatment, such as CPR  A DNR order may be included in other types of advance directives  · Medical directive: This covers the care that you want if you are in a coma, near death, or unable to make decisions for yourself  You can list the treatments you want for each condition  Treatment may include pain medicine, surgery, blood transfusions, dialysis, IV or tube feedings, and a ventilator (breathing machine)  · Values history: This document has questions about your views, beliefs, and how you feel and think about life  This information can help others choose the care that you would choose  Why are advance directives important? An advance directive helps you control your care  Although spoken wishes may be used, it is better to have your wishes written down  Spoken wishes can be misunderstood, or not followed  Treatments may be given even if you do not want them  An advance directive may make it easier for your family to make difficult choices about your care  Weight Management   Why it is important to manage your weight:  Being overweight increases your risk of health conditions such as heart disease, high blood pressure, type 2 diabetes, and certain types of cancer  It can also increase your risk for osteoarthritis, sleep apnea, and other respiratory problems  Aim for a slow, steady weight loss  Even a small amount of weight loss can lower your risk of health problems    How to lose weight safely:  A safe and healthy way to lose weight is to eat fewer calories and get regular exercise  You can lose up about 1 pound a week by decreasing the number of calories you eat by 500 calories each day  Healthy meal plan for weight management:  A healthy meal plan includes a variety of foods, contains fewer calories, and helps you stay healthy  A healthy meal plan includes the following:  · Eat whole-grain foods more often  A healthy meal plan should contain fiber  Fiber is the part of grains, fruits, and vegetables that is not broken down by your body  Whole-grain foods are healthy and provide extra fiber in your diet  Some examples of whole-grain foods are whole-wheat breads and pastas, oatmeal, brown rice, and bulgur  · Eat a variety of vegetables every day  Include dark, leafy greens such as spinach, kale, nazario greens, and mustard greens  Eat yellow and orange vegetables such as carrots, sweet potatoes, and winter squash  · Eat a variety of fruits every day  Choose fresh or canned fruit (canned in its own juice or light syrup) instead of juice  Fruit juice has very little or no fiber  · Eat low-fat dairy foods  Drink fat-free (skim) milk or 1% milk  Eat fat-free yogurt and low-fat cottage cheese  Try low-fat cheeses such as mozzarella and other reduced-fat cheeses  · Choose meat and other protein foods that are low in fat  Choose beans or other legumes such as split peas or lentils  Choose fish, skinless poultry (chicken or turkey), or lean cuts of red meat (beef or pork)  Before you cook meat or poultry, cut off any visible fat  · Use less fat and oil  Try baking foods instead of frying them  Add less fat, such as margarine, sour cream, regular salad dressing and mayonnaise to foods  Eat fewer high-fat foods  Some examples of high-fat foods include french fries, doughnuts, ice cream, and cakes  · Eat fewer sweets  Limit foods and drinks that are high in sugar  This includes candy, cookies, regular soda, and sweetened drinks    Exercise: Exercise at least 30 minutes per day on most days of the week  Some examples of exercise include walking, biking, dancing, and swimming  You can also fit in more physical activity by taking the stairs instead of the elevator or parking farther away from stores  Ask your healthcare provider about the best exercise plan for you  © Copyright [employer] 2018 Information is for End User's use only and may not be sold, redistributed or otherwise used for commercial purposes   All illustrations and images included in CareNotes® are the copyrighted property of [employer]  or [address] [place] [address]

## 2023-03-22 NOTE — PROGRESS NOTES
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)     Donepizil 10 mg PO QDay            Other    Pure hypercholesterolemia     Continue the Simvastatine        Other Visit Diagnoses     Medicare annual wellness visit, subsequent    -  Primary    Short-term memory loss        Screening PSA (prostate specific antigen)        Relevant Orders    PSA, Total Screen           Preventive health issues were discussed with patient, and age appropriate screening tests were ordered as noted in patient's After Visit Summary  Personalized health advice and appropriate referrals for health education or preventive services given if needed, as noted in patient's After Visit Summary  History of Present Illness:     Patient presents for a Medicare Wellness Visit    The patient was seen by neurology and they requested a driving test   The patient was seen and examined and noted to have issues with short term memory  The patient states that there are no other issues at this time     Patient Care Team:  Azra Navarrete DO as PCP - General (Internal Medicine)  Whitley Cadet MD (Cardiology)  Jack Sanchez MD (Urology)     Review of Systems:     Review of Systems   Constitutional: Negative for chills, fatigue and fever  HENT: Negative  Respiratory: Negative for cough, chest tightness and shortness of breath  Cardiovascular: Negative for chest pain and palpitations     Gastrointestinal: Negative for abdominal pain, constipation, diarrhea, nausea and vomiting  Genitourinary: Negative  Musculoskeletal: Negative for back pain and myalgias  Skin: Negative  Neurological: Negative  Psychiatric/Behavioral: Negative for dysphoric mood  The patient is not nervous/anxious  Problem List:     Patient Active Problem List   Diagnosis   • Benign colon polyp   • Benign essential hypertension   • D-dimer, elevated   • Combined arterial insufficiency and corporo-venous occlusive erectile dysfunction   • Pure hypercholesterolemia   • Paroxysmal atrial fibrillation (HCC)   • Acquired trigger finger   • Low back pain   • BPH with obstruction/lower urinary tract symptoms   • Pre-operative clearance   • Excessive sweating   • Frequency of micturition   • Nonrheumatic aortic valve insufficiency   • Sleep apnea, obstructive   • COVID-19 virus detected   • Late onset Alzheimer's dementia without behavioral disturbance [facility])      Past Medical and Surgical History:     Past Medical History:   Diagnosis Date   • Anemia 12/10/2012   • BPH with obstruction/lower urinary tract symptoms    • Cataract    • Frequency of micturition    • Hypercholesteremia    • Memory loss    • Poor urinary stream      Past Surgical History:   Procedure Laterality Date   • CATARACT EXTRACTION Bilateral 02/2019, 03/2019   • HERNIA REPAIR        Family History:     Family History   Problem Relation Age of Onset   • Coronary artery disease Mother    • No Known Problems Father    • Colon cancer Family       Social History:     Social History     Socioeconomic History   • Marital status: /Civil Union     Spouse name: None   • Number of children: None   • Years of education: None   • Highest education level: None   Occupational History   • Occupation: RETIRED   Tobacco Use   • Smoking status: Never   • Smokeless tobacco: Never   Vaping Use   • Vaping Use: Never used   Substance and Sexual Activity   • Alcohol use:  Yes Alcohol/week: 1 0 - 2 0 standard drink     Types: 1 - 2 Glasses of wine per week     Comment: weekly   • Drug use: No   • Sexual activity: Not Currently   Other Topics Concern   • None   Social History Narrative        RETIRED     Social Determinants of Health     Financial Resource Strain: Low Risk    • Difficulty of Paying Living Expenses: Not hard at all   Food Insecurity: Not on file   Transportation Needs: No Transportation Needs   • Lack of Transportation (Medical): No   • Lack of Transportation (Non-Medical):  No   Physical Activity: Not on file   Stress: Not on file   Social Connections: Not on file   Intimate Partner Violence: Not on file   Housing Stability: Not on file      Medications and Allergies:     Current Outpatient Medications   Medication Sig Dispense Refill   • Calcium Carbonate-Vitamin D 600-400 MG-UNIT per tablet Take 1 tablet by mouth daily     • coenzyme Q-10 100 MG capsule Take 1 capsule by mouth daily       • donepezil (ARICEPT) 10 mg tablet Take 1 tablet (10 mg total) by mouth daily at bedtime 90 tablet 1   • Eliquis 5 MG Take 5 mg by mouth 2 (two) times a day     • metoprolol tartrate (LOPRESSOR) 25 mg tablet take 1 tablet by mouth every 12 hours 180 tablet 1   • Multiple Vitamins-Minerals (CENTRUM ADULTS PO) Take 1 tablet by mouth daily     • Omega-3 1000 MG CAPS Take 1 capsule by mouth 2 (two) times a day       • ramipril (ALTACE) 10 MG capsule Take by mouth daily       • simvastatin (ZOCOR) 40 mg tablet Take 1 tablet (40 mg total) by mouth daily 90 tablet 3   • amLODIPine (NORVASC) 5 mg tablet 5 mg daily   0   • betamethasone dipropionate (DIPROSONE) 0 05 % cream Apply topically 2 (two) times a day (Patient not taking: Reported on 1/27/2023) 30 g 0   • Calcium Carbonate-Vitamin D 600-400 MG-UNIT per tablet Take 1 tablet by mouth daily     • ketoconazole (NIZORAL) 2 % cream Apply 1 application topically if needed (Patient not taking: Reported on 1/27/2023)     • Lecithin 1200 MG CAPS Take by mouth daily (Patient not taking: Reported on 1/27/2023)     • tadalafil (CIALIS) 20 MG tablet Take 1 tablet (20 mg total) by mouth daily as needed for erectile dysfunction (Patient not taking: Reported on 1/27/2023) 30 tablet 3     No current facility-administered medications for this visit  No Known Allergies   Immunizations:     Immunization History   Administered Date(s) Administered   • COVID-19 MODERNA VACC 0 5 ML IM 01/20/2021, 02/17/2021, 12/03/2021   • INFLUENZA 09/30/2015, 09/07/2018, 10/15/2018, 10/03/2019, 09/23/2020, 09/21/2022   • Influenza Quadrivalent Preservative Free 3 years and older IM 09/30/2015   • Influenza Split High Dose Preservative Free IM 10/05/2016, 10/09/2017   • Influenza, high dose seasonal 0 7 mL 09/23/2020, 09/15/2021, 09/21/2022   • Influenza, seasonal, injectable 10/03/2012, 10/02/2013   • Pneumococcal Conjugate 13-Valent 03/21/2016, 04/05/2017   • Tdap 03/02/2015   • Zoster Vaccine Recombinant 10/16/2018   • influenza, trivalent, adjuvanted 09/30/2019      Health Maintenance: There are no preventive care reminders to display for this patient  Topic Date Due   • Pneumococcal Vaccine: 65+ Years (2 - PPSV23 if available, else PCV20) 04/05/2018   • COVID-19 Vaccine (4 - Booster for Evelyn Greirenecarolyn series) 01/28/2022      Medicare Screening Tests and Risk Assessments:     Theresa Bailey is here for his Subsequent Wellness visit  Last Medicare Wellness visit information reviewed, patient interviewed and updates made to the record today  Health Risk Assessment:   Patient rates overall health as very good  Patient feels that their physical health rating is same  Patient is very satisfied with their life  Eyesight was rated as same  Hearing was rated as same  Patient feels that their emotional and mental health rating is same  Patients states they are never, rarely angry  Patient states they are sometimes unusually tired/fatigued   Pain experienced in the last 7 days has been none  Patient states that he has experienced no weight loss or gain in last 6 months  Depression Screening:   PHQ-2 Score: 0      Fall Risk Screening: In the past year, patient has experienced: no history of falling in past year      Home Safety:  Patient does not have trouble with stairs inside or outside of their home  Patient has working smoke alarms and has working carbon monoxide detector  Home safety hazards include: none  Nutrition:   Current diet is Regular  Medications:   Patient is currently taking over-the-counter supplements  OTC medications include: see medication list  Patient is not able to manage medications  Activities of Daily Living (ADLs)/Instrumental Activities of Daily Living (IADLs):   Walk and transfer into and out of bed and chair?: Yes  Dress and groom yourself?: Yes    Bathe or shower yourself?: Yes    Feed yourself? Yes  Do your laundry/housekeeping?: Yes  Manage your money, pay your bills and track your expenses?: No  Make your own meals?: Yes    Do your own shopping?: Yes    Previous Hospitalizations:   Any hospitalizations or ED visits within the last 12 months?: No      Advance Care Planning:   Living will: Yes    Durable POA for healthcare:  Yes    Advanced directive: Yes    Advanced directive counseling given: Yes    Five wishes given: No    Patient declined ACP directive: No    End of Life Decisions reviewed with patient: Yes    Provider agrees with end of life decisions: Yes      Cognitive Screening:   Provider or family/friend/caregiver concerned regarding cognition?: No    PREVENTIVE SCREENINGS      Cardiovascular Screening:    General: Screening Not Indicated and History Lipid Disorder    Due for: Lipid Panel      Diabetes Screening:       Due for: Blood Glucose      Colorectal Cancer Screening:     General: Screening Not Indicated      Prostate Cancer Screening:    General: Screening Not Indicated      Osteoporosis Screening:    General: Patient Declines Abdominal Aortic Aneurysm (AAA) Screening:        General: Screening Not Indicated      Lung Cancer Screening:     General: Screening Not Indicated      Hepatitis C Screening:    General: Screening Not Indicated    Screening, Brief Intervention, and Referral to Treatment (SBIRT)    Screening  Typical number of drinks in a day: 0  Typical number of drinks in a week: 3  Interpretation: Low risk drinking behavior  Single Item Drug Screening:  How often have you used an illegal drug (including marijuana) or a prescription medication for non-medical reasons in the past year? never    Single Item Drug Screen Score: 0  Interpretation: Negative screen for possible drug use disorder    No results found  Physical Exam:     /78   Pulse (!) 47   Temp (!) 97 4 °F (36 3 °C)   Resp 14   Ht 5' 10" (1 778 m)   Wt 87 4 kg (192 lb 9 6 oz)   SpO2 95%   BMI 27 64 kg/m²     Physical Exam  Vitals and nursing note reviewed  Constitutional:       General: He is not in acute distress  Appearance: He is well-developed  HENT:      Head: Normocephalic and atraumatic  Eyes:      Conjunctiva/sclera: Conjunctivae normal    Cardiovascular:      Rate and Rhythm: Normal rate and regular rhythm  Heart sounds: No murmur heard  Pulmonary:      Effort: Pulmonary effort is normal  No respiratory distress  Breath sounds: Normal breath sounds  Abdominal:      Palpations: Abdomen is soft  Tenderness: There is no abdominal tenderness  Musculoskeletal:         General: No swelling  Cervical back: Neck supple  Skin:     General: Skin is warm and dry  Capillary Refill: Capillary refill takes less than 2 seconds  Neurological:      Mental Status: He is alert     Psychiatric:         Mood and Affect: Mood normal           Marisela Means DO

## 2023-03-28 DIAGNOSIS — E78.5 HYPERLIPIDEMIA, UNSPECIFIED HYPERLIPIDEMIA TYPE: ICD-10-CM

## 2023-03-28 RX ORDER — SIMVASTATIN 40 MG
40 TABLET ORAL DAILY
Qty: 90 TABLET | Refills: 3 | Status: SHIPPED | OUTPATIENT
Start: 2023-03-28

## 2023-04-04 DIAGNOSIS — I48.0 PAROXYSMAL ATRIAL FIBRILLATION (HCC): ICD-10-CM

## 2023-05-15 ENCOUNTER — OFFICE VISIT (OUTPATIENT)
Dept: INTERNAL MEDICINE CLINIC | Facility: CLINIC | Age: 88
End: 2023-05-15

## 2023-05-15 ENCOUNTER — LAB (OUTPATIENT)
Dept: LAB | Facility: CLINIC | Age: 88
End: 2023-05-15

## 2023-05-15 VITALS
SYSTOLIC BLOOD PRESSURE: 138 MMHG | RESPIRATION RATE: 12 BRPM | TEMPERATURE: 96.6 F | WEIGHT: 189 LBS | BODY MASS INDEX: 27.06 KG/M2 | HEIGHT: 70 IN | DIASTOLIC BLOOD PRESSURE: 82 MMHG | OXYGEN SATURATION: 98 % | HEART RATE: 76 BPM

## 2023-05-15 DIAGNOSIS — Z12.5 SCREENING PSA (PROSTATE SPECIFIC ANTIGEN): ICD-10-CM

## 2023-05-15 DIAGNOSIS — I10 BENIGN ESSENTIAL HYPERTENSION: ICD-10-CM

## 2023-05-15 DIAGNOSIS — S40.862A TICK BITE OF LEFT UPPER ARM, INITIAL ENCOUNTER: ICD-10-CM

## 2023-05-15 DIAGNOSIS — A69.20 ERYTHEMA MIGRANS (LYME DISEASE): Primary | ICD-10-CM

## 2023-05-15 DIAGNOSIS — A69.20 ERYTHEMA MIGRANS (LYME DISEASE): ICD-10-CM

## 2023-05-15 DIAGNOSIS — R97.20 ELEVATED PSA: ICD-10-CM
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
## 2023-05-15 LAB
ALBUMIN SERPL BCP-MCNC: 3.7 G/DL (ref 3.5–5)
ALP SERPL-CCNC: 46 U/L (ref 46–116)
ALT SERPL W P-5'-P-CCNC: 20 U/L (ref 12–78)
ANION GAP SERPL CALCULATED.3IONS-SCNC: 0 MMOL/L (ref 4–13)
AST SERPL W P-5'-P-CCNC: 15 U/L (ref 5–45)
BASOPHILS # BLD AUTO: 0.03 THOUSANDS/ÂΜL (ref 0–0.1)
BASOPHILS NFR BLD AUTO: 1 % (ref 0–1)
BILIRUB SERPL-MCNC: 0.72 MG/DL (ref 0.2–1)
BUN SERPL-MCNC: 9 MG/DL (ref 5–25)
CALCIUM SERPL-MCNC: 9.5 MG/DL (ref 8.3–10.1)
CHLORIDE SERPL-SCNC: 107 MMOL/L (ref 96–108)
CO2 SERPL-SCNC: 30 MMOL/L (ref 21–32)
CREAT SERPL-MCNC: 0.96 MG/DL (ref 0.6–1.3)
EOSINOPHIL # BLD AUTO: 0.2 THOUSAND/ÂΜL (ref 0–0.61)
EOSINOPHIL NFR BLD AUTO: 3 % (ref 0–6)
ERYTHROCYTE [DISTWIDTH] IN BLOOD BY AUTOMATED COUNT: 13.2 % (ref 11.6–15.1)
GFR SERPL CREATININE-BSD FRML MDRD: 71 ML/MIN/1.73SQ M
GLUCOSE SERPL-MCNC: 99 MG/DL (ref 65–140)
HCT VFR BLD AUTO: 45.9 % (ref 36.5–49.3)
HGB BLD-MCNC: 15.4 G/DL (ref 12–17)
IMM GRANULOCYTES # BLD AUTO: 0.02 THOUSAND/UL (ref 0–0.2)
IMM GRANULOCYTES NFR BLD AUTO: 0 % (ref 0–2)
LYMPHOCYTES # BLD AUTO: 1.09 THOUSANDS/ÂΜL (ref 0.6–4.47)
LYMPHOCYTES NFR BLD AUTO: 17 % (ref 14–44)
MCH RBC QN AUTO: 31.9 PG (ref 26.8–34.3)
MCHC RBC AUTO-ENTMCNC: 33.6 G/DL (ref 31.4–37.4)
MCV RBC AUTO: 95 FL (ref 82–98)
MONOCYTES # BLD AUTO: 0.46 THOUSAND/ÂΜL (ref 0.17–1.22)
MONOCYTES NFR BLD AUTO: 7 % (ref 4–12)
NEUTROPHILS # BLD AUTO: 4.5 THOUSANDS/ÂΜL (ref 1.85–7.62)
NEUTS SEG NFR BLD AUTO: 72 % (ref 43–75)
NRBC BLD AUTO-RTO: 0 /100 WBCS
PLATELET # BLD AUTO: 210 THOUSANDS/UL (ref 149–390)
PMV BLD AUTO: 9.1 FL (ref 8.9–12.7)
POTASSIUM SERPL-SCNC: 4.1 MMOL/L (ref 3.5–5.3)
PROT SERPL-MCNC: 7.7 G/DL (ref 6.4–8.4)
PSA SERPL-MCNC: 5.6 NG/ML (ref 0–4)
PSA SERPL-MCNC: 5.7 NG/ML (ref 0–4)
RBC # BLD AUTO: 4.83 MILLION/UL (ref 3.88–5.62)
SODIUM SERPL-SCNC: 137 MMOL/L (ref 135–147)
WBC # BLD AUTO: 6.3 THOUSAND/UL (ref 4.31–10.16)

## 2023-05-15 RX ORDER — MOMETASONE FUROATE 1 MG/ML
SOLUTION TOPICAL DAILY
Qty: 60 ML | Refills: 0 | Status: SHIPPED | OUTPATIENT
Start: 2023-05-15 | End: 2023-05-15

## 2023-05-15 RX ORDER — MOMETASONE FUROATE 1 MG/ML
SOLUTION TOPICAL DAILY
Qty: 60 ML | Refills: 0 | Status: SHIPPED | OUTPATIENT
Start: 2023-05-15

## 2023-05-15 RX ORDER — DOXYCYCLINE HYCLATE 100 MG
100 TABLET ORAL 2 TIMES DAILY
Qty: 28 TABLET | Refills: 0 | Status: SHIPPED | OUTPATIENT
Start: 2023-05-15 | End: 2023-05-29

## 2023-05-15 NOTE — PROGRESS NOTES
Assessment/Plan:    Problem List Items Addressed This Visit    None  Visit Diagnoses     Erythema migrans (Lyme disease)    -  Primary    Relevant Medications    doxycycline hyclate (VIBRA-TABS) 100 mg tablet    mometasone (ELOCON) 0 1 % lotion    Other Relevant Orders    Lyme Disease Serology w/Reflex    Tick bite of left upper arm, initial encounter        Relevant Medications    mometasone (ELOCON) 0 1 % lotion    Other Relevant Orders    Ehrlichia antibody panel    Babesia microti antibody, IgG & Igm    Jay Jay mountain spotted fever igg           Diagnoses and all orders for this visit:    Erythema migrans (Lyme disease)  -     Lyme Disease Serology w/Reflex; Future  -     doxycycline hyclate (VIBRA-TABS) 100 mg tablet; Take 1 tablet (100 mg total) by mouth 2 (two) times a day for 14 days  -     mometasone (ELOCON) 0 1 % lotion; Apply topically daily    Tick bite of left upper arm, initial encounter  -     Ehrlichia antibody panel; Future  -     Babesia microti antibody, IgG & Igm; Future  -     Jay Jay mountain spotted fever igg; Future  -     mometasone (ELOCON) 0 1 % lotion; Apply topically daily      No problem-specific Assessment & Plan notes found for this encounter  Subjective:      Patient ID: Mariluz Resendiz is a 82 y o  male  The patient has a centralized bite with expanding erythema on the left flexor surface of the forearm  This has been present for 7 days  He notes fatigue, but no fevers chills, myalgia and arthralgia  He states that the rash does not itch  The following portions of the patient's history were reviewed and updated as appropriate:   He has a past medical history of Anemia (12/10/2012), BPH with obstruction/lower urinary tract symptoms, Cataract, Frequency of micturition, Hypercholesteremia, Memory loss, and Poor urinary stream ,  does not have any pertinent problems on file  ,   has a past surgical history that includes Hernia repair and Cataract extraction (Bilateral, 02/2019, 03/2019)  ,  family history includes Colon cancer in his family; Coronary artery disease in his mother; No Known Problems in his father  ,   reports that he has never smoked  He has never used smokeless tobacco  He reports current alcohol use of about 1 0 - 2 0 standard drink per week  He reports that he does not use drugs  ,  has No Known Allergies     Current Outpatient Medications   Medication Sig Dispense Refill   • amLODIPine (NORVASC) 5 mg tablet 5 mg daily   0   • Calcium Carbonate-Vitamin D 600-400 MG-UNIT per tablet Take 1 tablet by mouth daily     • coenzyme Q-10 100 MG capsule Take 1 capsule by mouth daily       • donepezil (ARICEPT) 10 mg tablet Take 1 tablet (10 mg total) by mouth daily at bedtime 90 tablet 1   • doxycycline hyclate (VIBRA-TABS) 100 mg tablet Take 1 tablet (100 mg total) by mouth 2 (two) times a day for 14 days 28 tablet 0   • Eliquis 5 MG Take 5 mg by mouth 2 (two) times a day     • metoprolol tartrate (LOPRESSOR) 25 mg tablet take 1 tablet by mouth every 12 hours 180 tablet 1   • mometasone (ELOCON) 0 1 % lotion Apply topically daily 60 mL 0   • Multiple Vitamins-Minerals (CENTRUM ADULTS PO) Take 1 tablet by mouth daily     • Omega-3 1000 MG CAPS Take 1 capsule by mouth 2 (two) times a day       • ramipril (ALTACE) 10 MG capsule Take by mouth daily       • simvastatin (ZOCOR) 40 mg tablet Take 1 tablet (40 mg total) by mouth daily 90 tablet 3   • betamethasone dipropionate (DIPROSONE) 0 05 % cream Apply topically 2 (two) times a day (Patient not taking: Reported on 1/27/2023) 30 g 0   • ketoconazole (NIZORAL) 2 % cream Apply 1 application topically if needed (Patient not taking: Reported on 1/27/2023)     • Lecithin 1200 MG CAPS Take by mouth daily (Patient not taking: Reported on 1/27/2023)     • tadalafil (CIALIS) 20 MG tablet Take 1 tablet (20 mg total) by mouth daily as needed for erectile dysfunction (Patient not taking: Reported on 1/27/2023) 30 tablet 3     No current "facility-administered medications for this visit  Review of Systems   Constitutional: Negative for chills, fatigue and fever  HENT: Negative  Respiratory: Negative for cough, chest tightness and shortness of breath  Cardiovascular: Negative for chest pain and palpitations  Gastrointestinal: Negative for abdominal pain, constipation, diarrhea, nausea and vomiting  Genitourinary: Negative  Musculoskeletal: Negative for back pain and myalgias  Skin: Negative  Neurological: Negative  Psychiatric/Behavioral: Negative for dysphoric mood  The patient is not nervous/anxious  Objective:  Vitals:    05/15/23 0939   BP: 138/82   Pulse: 76   Resp: 12   Temp: (!) 96 6 °F (35 9 °C)   SpO2: 98%   Weight: 85 7 kg (189 lb)   Height: 5' 10\" (1 778 m)     Body mass index is 27 12 kg/m²  Physical Exam  Vitals and nursing note reviewed  Constitutional:       Appearance: He is well-developed  HENT:      Head: Normocephalic and atraumatic  Eyes:      Pupils: Pupils are equal, round, and reactive to light  Cardiovascular:      Rate and Rhythm: Normal rate and regular rhythm  Heart sounds: Normal heart sounds  No murmur heard  Pulmonary:      Effort: Pulmonary effort is normal       Breath sounds: Normal breath sounds  No stridor  No rales  Abdominal:      General: Bowel sounds are normal  There is no distension  Palpations: Abdomen is soft  Tenderness: There is no abdominal tenderness  Musculoskeletal:         General: No deformity  Normal range of motion  Cervical back: Normal range of motion and neck supple  Skin:     General: Skin is warm and dry  Neurological:      Mental Status: He is alert and oriented to person, place, and time            PHQ-2/9 Depression Screening           "

## 2023-05-16 LAB — B BURGDOR IGG+IGM SER-ACNC: <0.2 AI

## 2023-05-16 NOTE — RESULT ENCOUNTER NOTE
Lyme antibody noted to be normal   Continue the doxycycline  Test might have been done to early  Other labs noted to be okay

## 2023-05-17 LAB
A PHAGOCYTOPH IGG TITR SER IF: NEGATIVE {TITER}
A PHAGOCYTOPH IGM TITR SER IF: NEGATIVE {TITER}
B MICROTI IGG TITR SER: NORMAL {TITER}
B MICROTI IGM TITR SER: NORMAL {TITER}
E CHAFFEENSIS IGG TITR SER IF: NEGATIVE {TITER}
E CHAFFEENSIS IGM TITR SER IF: NEGATIVE {TITER}

## 2023-05-18 LAB — R RICKETTSI IGG SER QL IA: NEGATIVE

## 2023-06-12 ENCOUNTER — APPOINTMENT (OUTPATIENT)
Dept: LAB | Facility: CLINIC | Age: 88
End: 2023-06-12
Payer: MEDICARE

## 2023-06-12 LAB
CHOLEST SERPL-MCNC: 133 MG/DL
HDLC SERPL-MCNC: 37 MG/DL
LDLC SERPL CALC-MCNC: 76 MG/DL (ref 0–100)
TRIGL SERPL-MCNC: 102 MG/DL

## 2023-06-12 PROCEDURE — 80061 LIPID PANEL: CPT

## 2023-07-14 ENCOUNTER — TELEPHONE (OUTPATIENT)
Dept: INTERNAL MEDICINE CLINIC | Facility: CLINIC | Age: 88
End: 2023-07-14

## 2023-07-14 NOTE — TELEPHONE ENCOUNTER
Patient's wife called and is asking about the new dementia medication Leqembi. If we have protocol for it's use and if you would be willing to prescribe it?

## 2023-07-14 NOTE — TELEPHONE ENCOUNTER
We would refer the patient to Neurology for this. You should check with your insurance company and see if they cover this.

## 2023-07-24 ENCOUNTER — TELEPHONE (OUTPATIENT)
Dept: UROLOGY | Facility: MEDICAL CENTER | Age: 88
End: 2023-07-24

## 2023-07-24 DIAGNOSIS — R97.20 ELEVATED PSA: Primary | ICD-10-CM

## 2023-07-26 ENCOUNTER — APPOINTMENT (OUTPATIENT)
Dept: LAB | Facility: CLINIC | Age: 88
End: 2023-07-26
Payer: MEDICARE

## 2023-07-26 DIAGNOSIS — R97.20 ELEVATED PSA: ICD-10-CM

## 2023-07-26 LAB — PSA SERPL-MCNC: 5.9 NG/ML (ref 0–4)

## 2023-07-26 PROCEDURE — 36415 COLL VENOUS BLD VENIPUNCTURE: CPT

## 2023-07-26 PROCEDURE — 84153 ASSAY OF PSA TOTAL: CPT

## 2023-08-04 ENCOUNTER — OFFICE VISIT (OUTPATIENT)
Dept: UROLOGY | Facility: MEDICAL CENTER | Age: 88
End: 2023-08-04
Payer: MEDICARE

## 2023-08-04 VITALS
DIASTOLIC BLOOD PRESSURE: 70 MMHG | SYSTOLIC BLOOD PRESSURE: 110 MMHG | BODY MASS INDEX: 26.92 KG/M2 | HEART RATE: 72 BPM | OXYGEN SATURATION: 100 % | HEIGHT: 70 IN | WEIGHT: 188 LBS

## 2023-08-04 DIAGNOSIS — R97.20 ELEVATED PSA: Primary | ICD-10-CM

## 2023-08-04 PROCEDURE — 99214 OFFICE O/P EST MOD 30 MIN: CPT | Performed by: NURSE PRACTITIONER

## 2023-08-04 NOTE — PROGRESS NOTES
8/4/2023      Chief Complaint   Patient presents with   • Benign Prostatic Hypertrophy     Assessment and Plan    82 y.o. male managed by our office    1. Benign prostatic hyperplasia with lower urinary tract symptoms  ? PSA performed 7/26/2023 resulted 5.90  ? RAQUEL performed prior office evaluation  ? Repeat PSA and RAQUEL in 6 months  ? MRI prostate, BMP ordered  ? Follow-up in the office after completion of MRI with Dr. Rohan Flores to discuss plan     2. Erectile dysfunction  ? Continue tadalafil    History of Present Illness  Jes Kingston is a 82 y.o. male here for follow up evaluation of  urinary symptoms secondary to benign prostatic hyperplasia.  Patient also has a history of erectile dysfunction for which he has been managed with tadalafil with good effects and minimal side effects.   Patient does have a most recent PSA performed 08/03/2021 resulting 6.4.  PSA trend below.  Patient currently denies all lower urinary tract symptoms.  He is currently satisfied with his urinary symptoms.  He reports sensation of complete bladder emptying with urination.  He denies changes to his general health since prior office evaluation. PSA, Total   Latest Ref Rng 0.00 - 4.00 ng/mL   4/3/2019 4.8 (H)    5/10/2019 3.4    8/19/2020 3.5    8/3/2021 6.4 (H)    11/5/2021 4.5 (H)    5/31/2022 5.2 (H)    1/9/2023 5.7 (H)    5/15/2023 5.6 (H)     5.7 (H)    7/26/2023 5.90 (H)         Review of Systems   Constitutional: Negative for chills and fever. Respiratory: Negative for cough and shortness of breath. Cardiovascular: Negative for chest pain. Gastrointestinal: Negative for abdominal distention, abdominal pain, blood in stool, nausea and vomiting. Genitourinary: Negative for difficulty urinating, dysuria, enuresis, flank pain, frequency, hematuria and urgency. Skin: Negative for rash.            AUA SYMPTOM SCORE    Flowsheet Row Most Recent Value   AUA SYMPTOM SCORE    How often have you had a sensation of not emptying your bladder completely after you finished urinating? 1   How often have you had to urinate again less than two hours after you finished urinating? 4   How often have you found you stopped and started again several times when you urinate? 2   How often have you found it difficult to postpone urination? 4   How often have you had a weak urinary stream? 2   How often have you had to push or strain to begin urination? 2   How many times did you most typically get up to urinate from the time you went to bed at night until the time you got up in the morning? 2   Quality of Life: If you were to spend the rest of your life with your urinary condition just the way it is now, how would you feel about that? 2   AUA SYMPTOM SCORE 17             Past Medical History  Past Medical History:   Diagnosis Date   • Anemia 12/10/2012   • BPH with obstruction/lower urinary tract symptoms    • Cataract    • Frequency of micturition    • Hypercholesteremia    • Memory loss    • Poor urinary stream        Past Social History  Past Surgical History:   Procedure Laterality Date   • CATARACT EXTRACTION Bilateral 02/2019, 03/2019   • HERNIA REPAIR       Social History     Tobacco Use   Smoking Status Never   Smokeless Tobacco Never       Past Family History  Family History   Problem Relation Age of Onset   • Coronary artery disease Mother    • No Known Problems Father    • Colon cancer Family        Past Social history  Social History     Socioeconomic History   • Marital status: /Civil Union     Spouse name: Not on file   • Number of children: Not on file   • Years of education: Not on file   • Highest education level: Not on file   Occupational History   • Occupation: RETIRED   Tobacco Use   • Smoking status: Never   • Smokeless tobacco: Never   Vaping Use   • Vaping Use: Never used   Substance and Sexual Activity   • Alcohol use:  Yes     Alcohol/week: 1.0 - 2.0 standard drink of alcohol     Types: 1 - 2 Glasses of wine per week Comment: weekly   • Drug use: No   • Sexual activity: Not Currently   Other Topics Concern   • Not on file   Social History Narrative        RETIRED     Social Determinants of Health     Financial Resource Strain: Low Risk  (3/22/2023)    Overall Financial Resource Strain (CARDIA)    • Difficulty of Paying Living Expenses: Not hard at all   Food Insecurity: Not on file   Transportation Needs: No Transportation Needs (3/22/2023)    PRAPARE - Transportation    • Lack of Transportation (Medical): No    • Lack of Transportation (Non-Medical): No   Physical Activity: Not on file   Stress: Not on file   Social Connections: Not on file   Intimate Partner Violence: Not on file   Housing Stability: Not on file       Current Medications  Current Outpatient Medications   Medication Sig Dispense Refill   • amLODIPine (NORVASC) 5 mg tablet 5 mg daily   0   • Calcium Carbonate-Vitamin D 600-400 MG-UNIT per tablet Take 1 tablet by mouth daily     • coenzyme Q-10 100 MG capsule Take 1 capsule by mouth daily       • Eliquis 5 MG Take 5 mg by mouth 2 (two) times a day     • metoprolol tartrate (LOPRESSOR) 25 mg tablet take 1 tablet by mouth every 12 hours 180 tablet 1   • Multiple Vitamins-Minerals (CENTRUM ADULTS PO) Take 1 tablet by mouth daily     • Omega-3 1000 MG CAPS Take 1 capsule by mouth 2 (two) times a day       • ramipril (ALTACE) 10 MG capsule Take by mouth daily       • simvastatin (ZOCOR) 40 mg tablet Take 1 tablet (40 mg total) by mouth daily 90 tablet 3   • betamethasone dipropionate (DIPROSONE) 0.05 % cream Apply topically 2 (two) times a day 30 g 0   • donepezil (ARICEPT) 10 mg tablet Take 1 tablet (10 mg total) by mouth daily at bedtime 90 tablet 1   • ketoconazole (NIZORAL) 2 % cream Apply 1 application.  topically if needed     • Lecithin 1200 MG CAPS Take by mouth daily     • mometasone (ELOCON) 0.1 % lotion APPLY TOPICALLY DAILY 60 mL 0   • tadalafil (CIALIS) 20 MG tablet Take 1 tablet (20 mg total) by mouth daily as needed for erectile dysfunction 30 tablet 3     No current facility-administered medications for this visit. Allergies  No Known Allergies      The following portions of the patient's history were reviewed and updated as appropriate: allergies, current medications, past medical history, past social history, past surgical history and problem list.      Vitals  Vitals:    08/04/23 1433   BP: 110/70   BP Location: Left arm   Patient Position: Sitting   Cuff Size: Large   Pulse: 72   SpO2: 100%   Weight: 85.3 kg (188 lb)   Height: 5' 10" (1.778 m)     Physical Exam  Physical Exam  Vitals reviewed. Constitutional:       General: He is not in acute distress. Appearance: Normal appearance. He is normal weight. HENT:      Head: Normocephalic. Cardiovascular:      Rate and Rhythm: Normal rate. Pulmonary:      Effort: No respiratory distress. Breath sounds: Normal breath sounds. Skin:     General: Skin is warm and dry. Neurological:      General: No focal deficit present. Mental Status: He is alert and oriented to person, place, and time. Psychiatric:         Mood and Affect: Mood normal.         Behavior: Behavior normal.       Results  No results found for this or any previous visit (from the past 1 hour(s)).]  Lab Results   Component Value Date    PSA 5.90 (H) 07/26/2023    PSA 5.7 (H) 05/15/2023    PSA 5.6 (H) 05/15/2023     Lab Results   Component Value Date    GLUCOSE 88 03/06/2015    CALCIUM 9.5 05/15/2023     03/06/2015    K 4.1 05/15/2023    CO2 30 05/15/2023     05/15/2023    BUN 9 05/15/2023    CREATININE 0.96 05/15/2023     Lab Results   Component Value Date    WBC 6.30 05/15/2023    HGB 15.4 05/15/2023    HCT 45.9 05/15/2023    MCV 95 05/15/2023     05/15/2023           Orders  Orders Placed This Encounter   Procedures   • MRI prostate multiparametric wo w contrast     No need for 3D rendering unless PIRADS 4+ score. Thank you.      Standing Status:   Future     Standing Expiration Date:   8/4/2027     Scheduling Instructions: There is no preparation for this test. Please leave your jewelry and valuables at home, wedding rings are the exception. All patients will be required to change into a hospital gown and pants. Street clothes are not permitted in the MRI. Magnetic nail polish must be removed prior to arrival for your test. Please bring your insurance cards, a form of photo ID and a list of your medications with you. Arrive 15 minutes prior to your appointment time in order to register. Please bring any prior CT or MRI studies of this area that were not performed at a [facility]. To schedule this appointment, please contact Central Scheduling at [phone]. Prior to your appointment, please make sure you complete the MRI Screening Form when you e-Check in for your appointment. This will be available starting 7 days before your appointment in [address] [place] [address]. You may receive an e-mail with an activation code if you do not have a [employer] account. If you do not have access to a device, we will complete your screening at your appointment. Order Specific Question:   Does this procedure require the 3T MRI at [facility] or [place]?     Answer:   Yes     Order Specific Question:   Release to patient through [employer]     Answer:   Immediate     Order Specific Question:   Is order priority selected as STAT? Answer:   No     Order Specific Question:   Reason for Exam (FREE TEXT)     Answer:   elevated psa     Order Specific Question:   Reason for Exam:     Answer:   elevated psa     Order Specific Question:   What is the patient's sedation requirement? If Medication for Claustrophobia is selected, order medication at this point. Answer:   No Sedation     Order Specific Question:   Does the patient have metallic implants?      Answer:   No   • PSA Total, Diagnostic     Standing Status:   Future     Standing Expiration Date: 8/4/2024   • Basic metabolic panel     This is a patient instruction: Patient fasting for 8 hours or longer recommended.      Standing Status:   Future     Standing Expiration Date:   8/4/2024       VARGAS Graham

## 2023-08-05 DIAGNOSIS — R41.3 SHORT-TERM MEMORY LOSS: ICD-10-CM

## 2023-08-05 RX ORDER — DONEPEZIL HYDROCHLORIDE 10 MG/1
10 TABLET, FILM COATED ORAL
Qty: 90 TABLET | Refills: 1 | Status: SHIPPED | OUTPATIENT
Start: 2023-08-05

## 2023-08-15 ENCOUNTER — APPOINTMENT (OUTPATIENT)
Dept: LAB | Facility: CLINIC | Age: 88
End: 2023-08-15
Payer: MEDICARE

## 2023-08-15 DIAGNOSIS — R97.20 ELEVATED PSA: ICD-10-CM

## 2023-08-15 LAB
ANION GAP SERPL CALCULATED.3IONS-SCNC: 3 MMOL/L
BUN SERPL-MCNC: 12 MG/DL (ref 5–25)
CALCIUM SERPL-MCNC: 9.7 MG/DL (ref 8.3–10.1)
CHLORIDE SERPL-SCNC: 108 MMOL/L (ref 96–108)
CO2 SERPL-SCNC: 28 MMOL/L (ref 21–32)
CREAT SERPL-MCNC: 1.04 MG/DL (ref 0.6–1.3)
GFR SERPL CREATININE-BSD FRML MDRD: 64 ML/MIN/1.73SQ M
GLUCOSE SERPL-MCNC: 121 MG/DL (ref 65–140)
POTASSIUM SERPL-SCNC: 4.1 MMOL/L (ref 3.5–5.3)
PSA SERPL-MCNC: 6.27 NG/ML (ref 0–4)
SODIUM SERPL-SCNC: 139 MMOL/L (ref 135–147)

## 2023-08-15 PROCEDURE — 80048 BASIC METABOLIC PNL TOTAL CA: CPT

## 2023-08-15 PROCEDURE — 84153 ASSAY OF PSA TOTAL: CPT

## 2023-08-15 PROCEDURE — 36415 COLL VENOUS BLD VENIPUNCTURE: CPT

## 2023-08-17 ENCOUNTER — HOSPITAL ENCOUNTER (OUTPATIENT)
Dept: RADIOLOGY | Age: 88
Discharge: HOME | End: 2023-08-17
Payer: MEDICARE

## 2023-08-17 DIAGNOSIS — R97.20 ELEVATED PSA: ICD-10-CM

## 2023-08-17 PROCEDURE — 72197 MRI PELVIS W/O & W/DYE: CPT

## 2023-08-17 PROCEDURE — 76377 3D RENDER W/INTRP POSTPROCES: CPT

## 2023-08-17 PROCEDURE — G1004 CDSM NDSC: HCPCS

## 2023-08-17 PROCEDURE — A9585 GADOBUTROL INJECTION: HCPCS | Performed by: NURSE PRACTITIONER

## 2023-08-17 RX ORDER — GADOBUTROL 604.72 MG/ML
9 INJECTION INTRAVENOUS
Status: COMPLETED | OUTPATIENT
Start: 2023-08-17 | End: 2023-08-17

## 2023-08-17 RX ADMIN — GADOBUTROL 9 ML: 604.72 INJECTION INTRAVENOUS at 10:57

## 2023-08-25 ENCOUNTER — TELEPHONE (OUTPATIENT)
Age: 88
End: 2023-08-25

## 2023-08-25 NOTE — TELEPHONE ENCOUNTER
Radiology Test Results - Radiology Calling with report update    Pt under care of: douglas    Imaging Completed: 08/17/23    Significant Findings - Please review

## 2023-08-28 ENCOUNTER — OFFICE VISIT (OUTPATIENT)
Dept: UROLOGY | Facility: CLINIC | Age: 88
End: 2023-08-28
Payer: MEDICARE

## 2023-08-28 VITALS
DIASTOLIC BLOOD PRESSURE: 80 MMHG | BODY MASS INDEX: 27.41 KG/M2 | SYSTOLIC BLOOD PRESSURE: 140 MMHG | HEART RATE: 68 BPM | WEIGHT: 191 LBS

## 2023-08-28 DIAGNOSIS — C61 PROSTATE CANCER (HCC): Primary | ICD-10-CM

## 2023-08-28 PROCEDURE — 99214 OFFICE O/P EST MOD 30 MIN: CPT | Performed by: UROLOGY

## 2023-08-28 NOTE — PROGRESS NOTES
[address] [facility] [facility]  [place] [address]  [phone]  [email or web address]      NAME: Adri Figueroa  AGE: 82 y.o. SEX: male  : 1935   MRN: [id number]    DATE: 2023  TIME: 3:13 PM    Assessment and Plan:    Rising PSA: With positive MRI category 5 lesion-I am giving him the clinical diagnosis of prostate cancer without a biopsy. Given that he is not undergoing radiation, surgery or chemotherapy, I believe that the risks of biopsy outweigh treatment with androgen deprivation therapy intermittently. We will check a PSA in 3 months and in 6 months. If the PSA rises dramatically or approaches 10 we will give him a 6-month Lupron. This is especially important that I wish to maintain his testosterone levels for as long as possible given his mild dementia and the fact that he is sexually active. He is also exercising quite a bit. He is in amazingly good health for 82years old. Chief Complaint   No chief complaint on file. History of Present Illness   72-year-old man, established patient but new to me-last seen by Karan Rhodes 2023 for BPH and elevated PSA. PSA was 5.90 2023. MRI of the prostate was ordered. This shows a category 5 lesion 2 cm right apex posterior lateral peripheral zone lesion. Volume 69.9 cc. PSA was 6.27 August 15, 2023. He is here to follow-up with these results. He has mild dementia. No major voiding complaints. He is still sexually active, and has intercourse once a month.       The following portions of the patient's history were reviewed and updated as appropriate: allergies, current medications, past family history, past medical history, past social history, past surgical history and problem list.  Past Medical History:   Diagnosis Date   • Anemia 12/10/2012   • BPH with obstruction/lower urinary tract symptoms    • Cataract    • Frequency of micturition    • Hypercholesteremia    • Memory loss    • Poor urinary stream      Past Surgical History:   Procedure Laterality Date   • CATARACT EXTRACTION Bilateral 02/2019, 03/2019   • HERNIA REPAIR       shoulder  Review of Systems   Review of Systems    Active Problem List     Patient Active Problem List   Diagnosis   • Benign colon polyp   • Benign essential hypertension   • D-dimer, elevated   • Combined arterial insufficiency and corporo-venous occlusive erectile dysfunction   • Pure hypercholesterolemia   • Paroxysmal atrial fibrillation (HCC)   • Acquired trigger finger   • Low back pain   • BPH with obstruction/lower urinary tract symptoms   • Pre-operative clearance   • Excessive sweating   • Frequency of micturition   • Nonrheumatic aortic valve insufficiency   • Sleep apnea, obstructive   • COVID-19 virus detected   • 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)       Objective   /80   Pulse 68   Wt 86.6 kg (191 lb)   BMI 27.41 kg/m²     Physical Exam        Current Medications     Current Outpatient Medications:   •  amLODIPine (NORVASC) 5 mg tablet, 5 mg daily , Disp: , Rfl: 0  •  betamethasone dipropionate (DIPROSONE) 0.05 % cream, Apply topically 2 (two) times a day, Disp: 30 g, Rfl: 0  •  Calcium Carbonate-Vitamin D 600-400 MG-UNIT per tablet, Take 1 tablet by mouth daily, Disp: , Rfl:   •  coenzyme Q-10 100 MG capsule, Take 1 capsule by mouth daily  , Disp: , Rfl:   •  donepezil (ARICEPT) 10 mg tablet, TAKE 1 TABLET BY MOUTH ONCE DAILY AT BEDTIME, Disp: 90 tablet, Rfl: 1  •  Eliquis 5 MG, Take 5 mg by mouth 2 (two) times a day, Disp: , Rfl:   •  ketoconazole (NIZORAL) 2 % cream, Apply 1 application.  topically if needed, Disp: , Rfl:   •  Lecithin 1200 MG CAPS, Take by mouth daily, Disp: , Rfl:   •  metoprolol tartrate (LOPRESSOR) 25 mg tablet, take 1 tablet by mouth every 12 hours, Disp: 180 tablet, Rfl: 1  •  mometasone (ELOCON) 0.1 % lotion, APPLY TOPICALLY DAILY, Disp: 60 mL, Rfl: 0  •  Multiple Vitamins-Minerals (CENTRUM ADULTS PO), Take 1 tablet by mouth daily, Disp: , Rfl:   •  Omega-3 1000 MG CAPS, Take 1 capsule by mouth 2 (two) times a day  , Disp: , Rfl:   •  ramipril (ALTACE) 10 MG capsule, Take by mouth daily  , Disp: , Rfl:   •  simvastatin (ZOCOR) 40 mg tablet, Take 1 tablet (40 mg total) by mouth daily, Disp: 90 tablet, Rfl: 3  •  tadalafil (CIALIS) 20 MG tablet, Take 1 tablet (20 mg total) by mouth daily as needed for erectile dysfunction, Disp: 30 tablet, Rfl: 3        Casey Yeung MD

## 2023-08-28 NOTE — PATIENT INSTRUCTIONS
Have PSA checked in 3 months and I will let you know the results. If the PSA dramatically rises we will start you on Lupron which is a hormone blocker. Otherwise see me in 6 months with another PSA after that.

## 2023-09-12 ENCOUNTER — OFFICE VISIT (OUTPATIENT)
Dept: INTERNAL MEDICINE CLINIC | Facility: CLINIC | Age: 88
End: 2023-09-12
Payer: MEDICARE

## 2023-09-12 VITALS
HEART RATE: 81 BPM | TEMPERATURE: 98.4 F | SYSTOLIC BLOOD PRESSURE: 148 MMHG | DIASTOLIC BLOOD PRESSURE: 78 MMHG | OXYGEN SATURATION: 97 % | WEIGHT: 190 LBS | HEIGHT: 70 IN | BODY MASS INDEX: 27.2 KG/M2

## 2023-09-12 DIAGNOSIS — T63.441A ACCIDENTAL BEE STING: Primary | ICD-10-CM

## 2023-09-12 PROCEDURE — 99213 OFFICE O/P EST LOW 20 MIN: CPT | Performed by: NURSE PRACTITIONER

## 2023-09-12 RX ORDER — CEPHALEXIN 500 MG/1
500 CAPSULE ORAL 2 TIMES DAILY
Qty: 20 CAPSULE | Refills: 0 | Status: SHIPPED | OUTPATIENT
Start: 2023-09-12 | End: 2023-09-22

## 2023-09-12 NOTE — PATIENT INSTRUCTIONS
Problem List Items Addressed This Visit          Other    Accidental bee sting - Primary     9/12/2023  To the back of his left ankle  Positive edema, tenderness, warm  Will start him on keflex and monitor him         Relevant Medications    cephalexin (KEFLEX) 500 mg capsule

## 2023-09-12 NOTE — PROGRESS NOTES
Assessment/Plan:     Problem List Items Addressed This Visit        Other    Accidental bee sting - Primary     9/12/2023  To the back of his left ankle  Positive edema, tenderness, warm  Will start him on keflex and monitor him         Relevant Medications    cephalexin (KEFLEX) 500 mg capsule       Subjective:      Patient ID: Valentino Lente is a 82 y.o. male. The patient is here today to discuss left ankle bee sting. Please continue to the [facility] section of the note for details of today's visit. The following portions of the patient's history were reviewed and updated as appropriate:     Past Medical History:  He has a past medical history of Anemia (12/10/2012), BPH with obstruction/lower urinary tract symptoms, Cataract, Dementia ([address]) (mild dementia diagnose 2-3-23d by neurologist), Frequency of micturition, Hypercholesteremia, Hypertension, Memory loss, and Poor urinary stream.,  _______________________________________________________________________  Medical Problems:  does not have any pertinent problems on file.,  _______________________________________________________________________  Past Surgical History:   has a past surgical history that includes Hernia repair and Cataract extraction (Bilateral, 02/2019, 03/2019). ,  _______________________________________________________________________  Family History:  family history includes Cancer in his sister and sister; Colon cancer in his family; Coronary artery disease in his mother; No Known Problems in his father.,  _______________________________________________________________________  Social History:   reports that he has never smoked. He has never used smokeless tobacco. He reports current alcohol use of about 3.0 standard drinks of alcohol per week. He reports that he does not use drugs. ,  _______________________________________________________________________  Allergies:  has No Known Allergies. .  _______________________________________________________________________  Current Outpatient Medications   Medication Sig Dispense Refill   • amLODIPine (NORVASC) 5 mg tablet 5 mg daily   0   • betamethasone dipropionate (DIPROSONE) 0.05 % cream Apply topically 2 (two) times a day 30 g 0   • Calcium Carbonate-Vitamin D 600-400 MG-UNIT per tablet Take 1 tablet by mouth daily     • cephalexin (KEFLEX) 500 mg capsule Take 1 capsule (500 mg total) by mouth 2 (two) times a day for 10 days 20 capsule 0   • coenzyme Q-10 100 MG capsule Take 1 capsule by mouth daily       • donepezil (ARICEPT) 10 mg tablet TAKE 1 TABLET BY MOUTH ONCE DAILY AT BEDTIME 90 tablet 1   • Eliquis 5 MG Take 5 mg by mouth 2 (two) times a day     • ketoconazole (NIZORAL) 2 % cream Apply 1 application. topically if needed     • Lecithin 1200 MG CAPS Take by mouth daily     • metoprolol tartrate (LOPRESSOR) 25 mg tablet take 1 tablet by mouth every 12 hours 180 tablet 1   • mometasone (ELOCON) 0.1 % lotion APPLY TOPICALLY DAILY 60 mL 0   • Multiple Vitamins-Minerals (CENTRUM ADULTS PO) Take 1 tablet by mouth daily     • Omega-3 1000 MG CAPS Take 1 capsule by mouth 2 (two) times a day       • ramipril (ALTACE) 10 MG capsule Take by mouth daily       • simvastatin (ZOCOR) 40 mg tablet Take 1 tablet (40 mg total) by mouth daily 90 tablet 3   • tadalafil (CIALIS) 20 MG tablet Take 1 tablet (20 mg total) by mouth daily as needed for erectile dysfunction 30 tablet 3     No current facility-administered medications for this visit.     _______________________________________________________________________      Review of Systems   Skin: Positive for wound. Objective:  Vitals:    09/12/23 1518   BP: 148/78   Pulse: 81   Temp: 98.4 °F (36.9 °C)   TempSrc: Tympanic   SpO2: 97%   Weight: 86.2 kg (190 lb)   Height: 5' 10" (1.778 m)     Body mass index is 27.26 kg/m².      Physical Exam  Musculoskeletal:        Feet:

## 2023-09-12 NOTE — ASSESSMENT & PLAN NOTE
· 9/12/2023  · To the back of his left ankle  · Positive edema, tenderness, warm  · Will start him on keflex and monitor him

## 2023-09-25 ENCOUNTER — TELEPHONE (OUTPATIENT)
Dept: ADMINISTRATIVE | Facility: OTHER | Age: 88
End: 2023-09-25

## 2023-09-25 NOTE — TELEPHONE ENCOUNTER
09/25/23 3:36 PM    Patient contacted (left message) to bring Advance Directive, POLST, or Living Will document to next scheduled pcp visit. Thank you.   Megan Carballo MA  [facility]

## 2023-09-26 ENCOUNTER — OFFICE VISIT (OUTPATIENT)
Dept: INTERNAL MEDICINE CLINIC | Facility: CLINIC | Age: 88
End: 2023-09-26
Payer: MEDICARE

## 2023-09-26 VITALS
OXYGEN SATURATION: 98 % | SYSTOLIC BLOOD PRESSURE: 132 MMHG | TEMPERATURE: 97.2 F | HEIGHT: 70 IN | DIASTOLIC BLOOD PRESSURE: 84 MMHG | BODY MASS INDEX: 27.54 KG/M2 | WEIGHT: 192.4 LBS | HEART RATE: 57 BPM

## 2023-09-26 DIAGNOSIS — E78.00 PURE HYPERCHOLESTEROLEMIA: ICD-10-CM

## 2023-09-26 DIAGNOSIS — I10 BENIGN ESSENTIAL HYPERTENSION: ICD-10-CM

## 2023-09-26 DIAGNOSIS — R41.3 SHORT-TERM MEMORY LOSS: ICD-10-CM

## 2023-09-26 DIAGNOSIS — G30.1 LATE ONSET ALZHEIMER'S DEMENTIA WITHOUT BEHAVIORAL DISTURBANCE (HCC): ICD-10-CM

## 2023-09-26 DIAGNOSIS — F02.80 LATE ONSET ALZHEIMER'S DEMENTIA WITHOUT BEHAVIORAL DISTURBANCE (HCC): ICD-10-CM

## 2023-09-26 DIAGNOSIS — I48.0 PAROXYSMAL ATRIAL FIBRILLATION (HCC): ICD-10-CM

## 2023-09-26 DIAGNOSIS — Z23 ENCOUNTER FOR IMMUNIZATION: Primary | ICD-10-CM

## 2023-09-26 PROBLEM — C61 PROSTATE CANCER (HCC): Status: ACTIVE | Noted: 2018-06-12

## 2023-09-26 PROCEDURE — 99214 OFFICE O/P EST MOD 30 MIN: CPT | Performed by: INTERNAL MEDICINE

## 2023-09-26 RX ORDER — DONEPEZIL HYDROCHLORIDE 10 MG/1
10 TABLET, FILM COATED ORAL
Qty: 90 TABLET | Refills: 1 | Status: SHIPPED | OUTPATIENT
Start: 2023-09-26

## 2023-09-26 NOTE — ASSESSMENT & PLAN NOTE
MRI noted 1 cm area of high probability for prostate CA  Conservative care recommended by Dr Susanna Mcmillan second opinion regarding this.

## 2023-09-26 NOTE — PROGRESS NOTES
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)     Aricpet 10 mg QHS         Relevant Medications    donepezil (ARICEPT) 10 mg tablet       Other    Pure hypercholesterolemia     Simvastatin 40 mg QHS tolerated  LDL cholesterol good on prior lipid panel        Other Visit Diagnoses     Encounter for immunization    -  Primary    Short-term memory loss        Relevant Medications    donepezil (ARICEPT) 10 mg tablet           Diagnoses and all orders for this visit:    Encounter for immunization    Short-term memory loss  -     donepezil (ARICEPT) 10 mg tablet; Take 1 tablet (10 mg total) by mouth daily at bedtime    Benign essential hypertension    Paroxysmal atrial fibrillation (HCC)    Late onset Alzheimer's dementia without behavioral disturbance ([address])    Pure hypercholesterolemia        Benign essential hypertension  The patient BP is controlled with the Metoprolol and Ramipril and no change at this time    Paroxysmal atrial fibrillation (HCC)  Rate control with metoprolol  Eliquis 5 mg BID for anticoagulation    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 Subjective:      Patient ID: Naheed Morris is a 82 y.o. male. The patient has finished with cardiac rehab.   The patient is doing well with the current dose of Aricept. He was seen by Dr Nadira Hunter for Neuro. BP okay. The following portions of the patient's history were reviewed and updated as appropriate:   He has a past medical history of Anemia (12/10/2012), BPH with obstruction/lower urinary tract symptoms, Cataract, Dementia ([address]) (mild dementia diagnose 2-3-23d by neurologist), Frequency of micturition, Hypercholesteremia, Hypertension, Memory loss, and Poor urinary stream.,  does not have any pertinent problems on file. ,   has a past surgical history that includes Hernia repair and Cataract extraction (Bilateral, 02/2019, 03/2019). ,  family history includes Cancer in his sister and sister; Colon cancer in his family; Coronary artery disease in his mother; No Known Problems in his father. ,   reports that he has never smoked. He has never used smokeless tobacco. He reports current alcohol use of about 3.0 standard drinks of alcohol per week. He reports that he does not use drugs. ,  has No Known Allergies. .  Current Outpatient Medications   Medication Sig Dispense Refill   • amLODIPine (NORVASC) 5 mg tablet 5 mg daily   0   • Calcium Carbonate-Vitamin D 600-400 MG-UNIT per tablet Take 1 tablet by mouth daily     • coenzyme Q-10 100 MG capsule Take 1 capsule by mouth daily       • donepezil (ARICEPT) 10 mg tablet Take 1 tablet (10 mg total) by mouth daily at bedtime 90 tablet 1   • Eliquis 5 MG Take 5 mg by mouth 2 (two) times a day     • metoprolol tartrate (LOPRESSOR) 25 mg tablet take 1 tablet by mouth every 12 hours 180 tablet 1   • Multiple Vitamins-Minerals (CENTRUM ADULTS PO) Take 1 tablet by mouth daily     • Omega-3 1000 MG CAPS Take 1 capsule by mouth 2 (two) times a day       • ramipril (ALTACE) 10 MG capsule Take by mouth daily       • simvastatin (ZOCOR) 40 mg tablet Take 1 tablet (40 mg total) by mouth daily 90 tablet 3   • tadalafil (CIALIS) 20 MG tablet Take 1 tablet (20 mg total) by mouth daily as needed for erectile dysfunction 30 tablet 3 No current facility-administered medications for this visit. Review of Systems   Constitutional: Negative for chills, fatigue and fever. HENT: Negative. Respiratory: Negative for cough, chest tightness and shortness of breath. Cardiovascular: Negative for chest pain and palpitations. Gastrointestinal: Negative for abdominal pain, constipation, diarrhea, nausea and vomiting. Genitourinary: Negative. Musculoskeletal: Negative for back pain and myalgias. Skin: Negative. Neurological: Negative. Psychiatric/Behavioral: Negative for dysphoric mood. The patient is not nervous/anxious. Objective:  Vitals:    09/26/23 1004   BP: 132/84   Pulse: 57   Temp: (!) 97.2 °F (36.2 °C)   SpO2: 98%   Weight: 87.3 kg (192 lb 6.4 oz)   Height: 5' 10" (1.778 m)     Body mass index is 27.61 kg/m². Physical Exam  Vitals and nursing note reviewed. Constitutional:       Appearance: He is well-developed. HENT:      Head: Normocephalic and atraumatic. Eyes:      Pupils: Pupils are equal, round, and reactive to light. Cardiovascular:      Rate and Rhythm: Normal rate and regular rhythm. Heart sounds: Normal heart sounds. No murmur heard. Pulmonary:      Effort: Pulmonary effort is normal.      Breath sounds: Normal breath sounds. No stridor. No rales. Abdominal:      General: Bowel sounds are normal. There is no distension. Palpations: Abdomen is soft. Tenderness: There is no abdominal tenderness. Musculoskeletal:         General: No deformity. Normal range of motion. Cervical back: Normal range of motion and neck supple. Skin:     General: Skin is warm and dry. Neurological:      Mental Status: He is alert and oriented to person, place, and time.           PHQ-2/9 Depression Screening

## 2023-10-09 DIAGNOSIS — I48.0 PAROXYSMAL ATRIAL FIBRILLATION (HCC): ICD-10-CM

## 2023-10-11 ENCOUNTER — TELEPHONE (OUTPATIENT)
Dept: INTERNAL MEDICINE CLINIC | Facility: CLINIC | Age: 88
End: 2023-10-11

## 2023-10-11 NOTE — TELEPHONE ENCOUNTER
Spouse called stating pt tested positive for Covid this morning. Symptoms started Monday: cough, slight temp, fatigue, nauseated.

## 2023-10-12 ENCOUNTER — TELEMEDICINE (OUTPATIENT)
Dept: INTERNAL MEDICINE CLINIC | Facility: CLINIC | Age: 88
End: 2023-10-12
Payer: MEDICARE

## 2023-10-12 VITALS — OXYGEN SATURATION: 98 %

## 2023-10-12 DIAGNOSIS — U07.1 COVID-19 VIRUS DETECTED: Primary | ICD-10-CM

## 2023-10-12 PROCEDURE — 99213 OFFICE O/P EST LOW 20 MIN: CPT | Performed by: NURSE PRACTITIONER

## 2023-10-12 NOTE — ASSESSMENT & PLAN NOTE
Vaccinated: yes with booster  Discussed Risks and Benefits of Vaccination   S/S started: bus trip [place] 10/8/2023  Positive sick contacts: bus trip   COVID-19 test on: 10/10/2023  Positive for: cough temp yesterday was 100.1 today temp is about 98.0, is drinking fluids, no SOB but tired and fatigued oxygen level has stayed above 94%   Denies the following: loss of taste or smell  Discussed isolation/quarantine until 24 hours after all symptoms have stopped (without fever reducing medications)  Temperature of Less Than 100 degrees for a Minimum of 24 Hours  New CDC guidelines for Returning to Work:   If your test is positive for COVID-19 you should stay Isolated for at least 5 days since you first has symptoms prior to returning to work  If your test is positive for COVID-19 you should stay Isolated for at least 10 days since you first has symptoms prior to returning to work  Discussed keeping active to prevent blood clots - do not just lay around - get up and walk around   Discussed deep breathing exercises, reposition yourself often   Discussed Proning: To Increase Oxygen Saturation and Improve Breathin Steps - Repeated   1. Maddy Ba onto your stomach - position your head in whatever position is comfortable to breath (you may use pillows to prop)  30 minutes to 2 hours  2. Switch to Lying on your Right Side   30 minutes to 2 hours  3. Switch to Sitting Upright (most comfortable for you)  30 minutes to 2 hours  4.  Switch to Lying on your Left Side   30 minutes to 2 hours  Reminded to change toothbrush to prevent re-contamination   Start taking Vitamin C 1000mg by mouth daily  Start taking Vitamin D 2000mg by mouth daily  Start taking Zinc 220mg by mouth daily OR a multivitamin daily   Transmission is by respiratory secretions/Isolation Guidelines:  Keep a Distance of at [employer] 6 Feet  Do Not Go Out In Public  Use a Separate Bathroom or Clean with a Disinfectant (Clorox) after each use  Do Not Share: Dishes, Cups, Silverware, Towels, Bedding  Cover your Mouth and Nose AT ALL TIMES  Wash Your Hands Often (Even After Using Hand )  Signs and Symptoms May Show Up 2 to 14 Days AFTER You have Been Exposed - This allows for this Virus to Spread! Fever, Cough, Shortness of Breath, Nasal and Chest Congestion   Chills, Diarrhea, Nausea, Vomiting, Abdominal Pain  Muscle Aches, Headaches, Dizziness  Post Nasal Drip, Sore Throat, Loss of Taste and/or Smell  Some or All of These Signs and Symptoms Can Last For Days, Weeks, or Even 3 to 6 Months and Longer  Monoclonal Antibody Infusion Treatment: This treatment has been granted FDA Emergency Use Authorization (EUA) for the treatment of mild to moderate COVID-19 disease. Our Supply is Very Limited. Current Recommendations is to have this Administered within 5 days of the Onset of Symptoms. It is only being approved for patient's who have NOT been Vaccinated. Paxlovid Treatment: This treatment has been granted FDA Emergency Use authrorization (EUA) for the treatment of mild to moderate COVID-19 disease. This must be started within 5 days of symptom onset. It is Contraindicated with a number of medications. There is different doses available based upon the patient's Creatinine Clearance level. If the patient takes a Lipid lowering medication; this medication should not be taken the day Paxlovid is started, and Must be held for an additional 10 days. Paxlovid should not be taken with Anticoagulation. Paxlovid is Contraindicated with the following disease/conditions: CKD stage 4 or 5, end-stage liver disease, age <12, weight < 40 kg, and Pregnancy.      End of Quarantine date: 10/18/2023

## 2023-10-12 NOTE — PROGRESS NOTES
COVID-19 Outpatient Progress Note    Assessment/Plan:    Problem List Items Addressed This Visit        Other    COVID-19 virus detected - Primary     Vaccinated: yes with booster  Discussed Risks and Benefits of Vaccination   S/S started: bus trip [place] 10/8/2023  Positive sick contacts: bus trip   COVID-19 test on: 10/10/2023  Positive for: cough temp yesterday was 100.1 today temp is about 98.0, is drinking fluids, no SOB but tired and fatigued oxygen level has stayed above 94%   Denies the following: loss of taste or smell  Discussed isolation/quarantine until 24 hours after all symptoms have stopped (without fever reducing medications)  Temperature of Less Than 100 degrees for a Minimum of 24 Hours  New CDC guidelines for Returning to Work:   If your test is positive for COVID-19 you should stay Isolated for at least 5 days since you first has symptoms prior to returning to work  If your test is positive for COVID-19 you should stay Isolated for at least 10 days since you first has symptoms prior to returning to work  Discussed keeping active to prevent blood clots - do not just lay around - get up and walk around   Discussed deep breathing exercises, reposition yourself often   Discussed Proning: To Increase Oxygen Saturation and Improve Breathin Steps - Repeated   1. Lucero Ciara onto your stomach - position your head in whatever position is comfortable to breath (you may use pillows to prop)  30 minutes to 2 hours  2. Switch to Lying on your Right Side   30 minutes to 2 hours  3. Switch to Sitting Upright (most comfortable for you)  30 minutes to 2 hours  4.  Switch to Lying on your Left Side   30 minutes to 2 hours  Reminded to change toothbrush to prevent re-contamination   Start taking Vitamin C 1000mg by mouth daily  Start taking Vitamin D 2000mg by mouth daily  Start taking Zinc 220mg by mouth daily OR a multivitamin daily   Transmission is by respiratory secretions/Isolation Guidelines:  Keep a Distance of at [facility] 6 Feet  Do Not Go Out In Public  Use a Separate Bathroom or Clean with a Disinfectant (Clorox) after each use  Do Not Share: Dishes, Cups, Silverware, Towels, Bedding  Cover your Mouth and Nose AT [facility][note 3] Often (Even After Using Hand )  Signs and Symptoms May Show Up 2 to 14 Days AFTER You have Been Exposed - This allows for this Virus to Spread! Fever, Cough, Shortness of Breath, Nasal and Chest Congestion   Chills, Diarrhea, Nausea, Vomiting, Abdominal Pain  Muscle Aches, Headaches, Dizziness  Post Nasal Drip, Sore Throat, Loss of Taste and/or Smell  Some or All of These Signs and Symptoms Can Last For Days, Weeks, or Even 3 to 6 Months and Longer  Monoclonal Antibody Infusion Treatment: This treatment has been granted FDA Emergency Use Authorization (EUA) for the treatment of mild to moderate COVID-19 disease. Our Supply is Very Limited. Current Recommendations is to have this Administered within 5 days of the Onset of Symptoms. It is only being approved for patient's who have NOT been Vaccinated. Paxlovid Treatment: This treatment has been granted FDA Emergency Use authrorization (EUA) for the treatment of mild to moderate COVID-19 disease. This must be started within 5 days of symptom onset. It is Contraindicated with a number of medications. There is different doses available based upon the patient's Creatinine Clearance level. If the patient takes a Lipid lowering medication; this medication should not be taken the day Paxlovid is started, and Must be held for an additional 10 days. Paxlovid should not be taken with Anticoagulation. Paxlovid is Contraindicated with the following disease/conditions: CKD stage 4 or 5, end-stage liver disease, age <12, weight < 40 kg, and Pregnancy. End of Quarantine date: 10/18/2023           Disposition:     Discussed symptom directed medication options with patient.  Discussed vitamin D, vitamin C, and/or zinc supplementation with patient. I have spent a total time of 20 minutes on the day of the encounter for this patient including discussing prognosis, risks and benefits of treatment options, instructions for management, patient and family education, importance of treatment compliance, risk factor reductions, impressions, counseling/coordination of care, documenting in the medical record and obtaining or reviewing history. Encounter provider: VARGAS Ferrell     Provider located at: [address] [place] [address]  [address] [place] [id number]     Recent Visits  Date Type Provider Dept   10/11/23 Telephone Elliot Bhatti [facility] [address] recent visits within past 7 days and meeting all other requirements  Today's Visits  Date Type Provider Dept   10/12/23 Telemedicine AVRGAS Ferrell [facility] [address] today's visits and meeting all other requirements  Future Appointments  No visits were found meeting these conditions. Showing future appointments within next 150 days and meeting all other requirements     This virtual check-in was done via [address] [place] [address] and patient was informed that this is a secure, HIPAA-compliant platform. He agrees to proceed. Patient agrees to participate in a virtual check in via telephone or video visit instead of presenting to the office to address urgent/immediate medical needs. Patient is aware this is a billable service. He acknowledged consent and understanding of privacy and security of the video platform. The patient has agreed to participate and understands they can discontinue the visit at any time. After connecting through [facility], the patient was identified by name and date of birth. Marquis Hamman was informed that this was a telemedicine visit and that the exam was being conducted confidentially over secure lines. My office door was closed. No one else was in the room.  Marquis Hamman acknowledged consent and understanding of privacy and security of the telemedicine visit. I informed the patient that I have reviewed his record in Epic and presented the opportunity for him to ask any questions regarding the visit today. The patient agreed to participate. Verification of patient location:  Patient is located in the following state in which I hold an active license: [place]    Subjective: Verner Fitz is a 82 y.o. male who is concerned about COVID-19. Patient's symptoms include fever, chills, fatigue, rhinorrhea, cough and myalgias. Patient denies anosmia, loss of taste, shortness of breath and chest tightness.     - Date of symptom onset: 10/9/2023  - Date of exposure: 10/8/2023      COVID-19 vaccination status: Fully vaccinated with booster    Exposure:   Contact with a person who is under investigation (PUI) for or who is positive for COVID-19 within the last 14 days?: No    Hospitalized recently for fever and/or lower respiratory symptoms?: No      Currently a healthcare worker that is involved in direct patient care?: No      Works in a special setting where the risk of COVID-19 transmission may be high? (this may include long-term care, correctional and prison facilities; homeless shelters; assisted-living facilities and group homes.): No      Resident in a special setting where the risk of COVID-19 transmission may be high? (this may include long-term care, correctional and prison facilities; homeless shelters; assisted-living facilities and group homes.): No      No results found for: "Audrene Confer", "[facility]", "[address] [place] [address],[address]", "[facility]", "[facility]", "[facility]"    Review of Systems   Constitutional:  Positive for chills, fatigue and fever. HENT:  Positive for rhinorrhea. Respiratory:  Positive for cough. Negative for chest tightness and shortness of breath. Musculoskeletal:  Positive for myalgias.      Current Outpatient Medications on File Prior to Visit   Medication Sig   • amLODIPine (NORVASC) 5 mg tablet 5 mg daily    • Calcium Carbonate-Vitamin D 600-400 MG-UNIT per tablet Take 1 tablet by mouth daily   • coenzyme Q-10 100 MG capsule Take 1 capsule by mouth daily     • donepezil (ARICEPT) 10 mg tablet Take 1 tablet (10 mg total) by mouth daily at bedtime   • Eliquis 5 MG Take 5 mg by mouth 2 (two) times a day   • metoprolol tartrate (LOPRESSOR) 25 mg tablet take 1 tablet by mouth every 12 hours   • Multiple Vitamins-Minerals (CENTRUM ADULTS PO) Take 1 tablet by mouth daily   • Omega-3 1000 MG CAPS Take 1 capsule by mouth 2 (two) times a day     • ramipril (ALTACE) 10 MG capsule Take by mouth daily     • simvastatin (ZOCOR) 40 mg tablet Take 1 tablet (40 mg total) by mouth daily   • tadalafil (CIALIS) 20 MG tablet Take 1 tablet (20 mg total) by mouth daily as needed for erectile dysfunction       Objective:    SpO2 98%        Physical Exam  HENT:      Nose:      Comments: Some post nasal drip   Pulmonary:      Comments: Cough, denies shortness of breath   Musculoskeletal:      Comments: Chronic fatigue and weakness.         VARGAS Pedro

## 2023-10-12 NOTE — PATIENT INSTRUCTIONS
Problem List Items Addressed This Visit          Other    COVID-19 virus detected - Primary     Vaccinated: yes with booster  Discussed Risks and Benefits of Vaccination   S/S started: bus trip [place] 10/8/2023  Positive sick contacts: bus trip   COVID-19 test on: 10/10/2023  Positive for: cough temp yesterday was 100.1 today temp is about 98.0, is drinking fluids, no SOB but tired and fatigued oxygen level has stayed above 94%   Denies the following: loss of taste or smell  Discussed isolation/quarantine until 24 hours after all symptoms have stopped (without fever reducing medications)  Temperature of Less Than 100 degrees for a Minimum of 24 Hours  New CDC guidelines for Returning to Work:   If your test is positive for COVID-19 you should stay Isolated for at least 5 days since you first has symptoms prior to returning to work  If your test is positive for COVID-19 you should stay Isolated for at least 10 days since you first has symptoms prior to returning to work  Discussed keeping active to prevent blood clots - do not just lay around - get up and walk around   Discussed deep breathing exercises, reposition yourself often   Discussed Proning: To Increase Oxygen Saturation and Improve Breathin Steps - Repeated   1. Stacy Pedro onto your stomach - position your head in whatever position is comfortable to breath (you may use pillows to prop)  30 minutes to 2 hours  2. Switch to Lying on your Right Side   30 minutes to 2 hours  3. Switch to Sitting Upright (most comfortable for you)  30 minutes to 2 hours  4.  Switch to Lying on your Left Side   30 minutes to 2 hours  Reminded to change toothbrush to prevent re-contamination   Start taking Vitamin C 1000mg by mouth daily  Start taking Vitamin D 2000mg by mouth daily  Start taking Zinc 220mg by mouth daily OR a multivitamin daily   Transmission is by respiratory secretions/Isolation Guidelines:  Keep a Distance of at Least 6 Feet  Do Not Go Out In Public  Use a Separate Bathroom or Clean with a Disinfectant (Clorox) after each use  Do Not Share: Dishes, Cups, Silverware, Towels, Bedding  Cover your Mouth and Nose AT Naeem Schein Your Hands Often (Even After Using Hand )  Signs and Symptoms May Show Up 2 to 14 Days AFTER You have Been Exposed - This allows for this Virus to Spread! Fever, Cough, Shortness of Breath, Nasal and Chest Congestion   Chills, Diarrhea, Nausea, Vomiting, Abdominal Pain  Muscle Aches, Headaches, Dizziness  Post Nasal Drip, Sore Throat, Loss of Taste and/or Smell  Some or All of These Signs and Symptoms Can Last For Days, Weeks, or Even 3 to 6 Months and Longer  Monoclonal Antibody Infusion Treatment: This treatment has been granted FDA Emergency Use Authorization (EUA) for the treatment of mild to moderate COVID-19 disease. Our Supply is Very Limited. Current Recommendations is to have this Administered within 5 days of the Onset of Symptoms. It is only being approved for patient's who have NOT been Vaccinated. Paxlovid Treatment: This treatment has been granted FDA Emergency Use authrorization (EUA) for the treatment of mild to moderate COVID-19 disease. This must be started within 5 days of symptom onset. It is Contraindicated with a number of medications. There is different doses available based upon the patient's Creatinine Clearance level. If the patient takes a Lipid lowering medication; this medication should not be taken the day Paxlovid is started, and Must be held for an additional 10 days. Paxlovid should not be taken with Anticoagulation. Paxlovid is Contraindicated with the following disease/conditions: CKD stage 4 or 5, end-stage liver disease, age <12, weight < 40 kg, and Pregnancy.      End of Quarantine date: 10/18/2023

## 2024-01-10 ENCOUNTER — OFFICE VISIT (OUTPATIENT)
Dept: INTERNAL MEDICINE CLINIC | Facility: CLINIC | Age: 89
End: 2024-01-10
Payer: MEDICARE

## 2024-01-10 VITALS
BODY MASS INDEX: 27.92 KG/M2 | OXYGEN SATURATION: 98 % | SYSTOLIC BLOOD PRESSURE: 138 MMHG | DIASTOLIC BLOOD PRESSURE: 84 MMHG | TEMPERATURE: 97.4 F | HEART RATE: 78 BPM | HEIGHT: 70 IN | WEIGHT: 195 LBS

## 2024-01-10 DIAGNOSIS — Z23 ENCOUNTER FOR IMMUNIZATION: ICD-10-CM

## 2024-01-10 DIAGNOSIS — G30.1 LATE ONSET ALZHEIMER'S DEMENTIA WITHOUT BEHAVIORAL DISTURBANCE (HCC): ICD-10-CM

## 2024-01-10 DIAGNOSIS — R41.3 SHORT-TERM MEMORY LOSS: ICD-10-CM

## 2024-01-10 DIAGNOSIS — F02.80 LATE ONSET ALZHEIMER'S DEMENTIA WITHOUT BEHAVIORAL DISTURBANCE (HCC): ICD-10-CM

## 2024-01-10 DIAGNOSIS — C61 PROSTATE CANCER (HCC): ICD-10-CM

## 2024-01-10 DIAGNOSIS — M62.830 LUMBAR PARASPINAL MUSCLE SPASM: Primary | ICD-10-CM

## 2024-01-10 DIAGNOSIS — I48.0 PAROXYSMAL ATRIAL FIBRILLATION (HCC): ICD-10-CM

## 2024-01-10 DIAGNOSIS — E78.5 HYPERLIPIDEMIA, UNSPECIFIED HYPERLIPIDEMIA TYPE: ICD-10-CM

## 2024-01-10 PROBLEM — R97.20 ELEVATED PSA: Status: ACTIVE | Noted: 2023-10-17

## 2024-01-10 PROBLEM — N13.8 BPH WITH OBSTRUCTION/LOWER URINARY TRACT SYMPTOMS: Status: ACTIVE | Noted: 2018-06-12

## 2024-01-10 PROBLEM — N40.1 BPH WITH OBSTRUCTION/LOWER URINARY TRACT SYMPTOMS: Status: ACTIVE | Noted: 2018-06-12

## 2024-01-10 PROBLEM — M54.16 LUMBAR RADICULOPATHY: Status: ACTIVE | Noted: 2024-01-03

## 2024-01-10 PROCEDURE — 99214 OFFICE O/P EST MOD 30 MIN: CPT | Performed by: INTERNAL MEDICINE

## 2024-01-10 RX ORDER — DONEPEZIL HYDROCHLORIDE 10 MG/1
10 TABLET, FILM COATED ORAL
Qty: 90 TABLET | Refills: 1 | Status: SHIPPED | OUTPATIENT
Start: 2024-01-10

## 2024-01-10 RX ORDER — METHYLPREDNISOLONE 4 MG/1
TABLET ORAL
Qty: 21 EACH | Refills: 0 | Status: SHIPPED | OUTPATIENT
Start: 2024-01-10

## 2024-01-10 RX ORDER — SIMVASTATIN 40 MG
40 TABLET ORAL DAILY
Qty: 90 TABLET | Refills: 3 | Status: SHIPPED | OUTPATIENT
Start: 2024-01-10

## 2024-01-10 RX ORDER — BACLOFEN 10 MG/1
10 TABLET ORAL 3 TIMES DAILY
Qty: 63 TABLET | Refills: 0 | Status: SHIPPED | OUTPATIENT
Start: 2024-01-10 | End: 2024-01-31

## 2024-01-10 RX ORDER — CALCIUM POLYCARBOPHIL 625 MG
625 TABLET ORAL DAILY
COMMUNITY

## 2024-01-10 NOTE — PROGRESS NOTES
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)     Continue to follow up with Neurology  The patient is toelrating the Aricept 10 mg Qday         Relevant Medications    donepezil (ARICEPT) 10 mg tablet   Other Visit Diagnoses     Lumbar paraspinal muscle spasm    -  Primary    Relevant Medications    methylPREDNISolone 4 MG tablet therapy pack    baclofen 10 mg tablet    Hyperlipidemia, unspecified hyperlipidemia type        Relevant Medications    simvastatin (ZOCOR) 40 mg tablet    Encounter for immunization        Relevant Orders    Pneumococcal Conjugate Vaccine 20-valent (PCV20)    Short-term memory loss        Relevant Medications    donepezil (ARICEPT) 10 mg tablet           Diagnoses and all orders for this visit:    Lumbar paraspinal muscle spasm  -     methylPREDNISolone 4 MG tablet therapy pack; Use as directed on package  -     baclofen 10 mg tablet; Take 1 tablet (10 mg total) by mouth 3 (three) times a day for 21 days    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)    Paroxysmal atrial fibrillation (HCC)    Hyperlipidemia, unspecified hyperlipidemia type  -     simvastatin (ZOCOR) 40 mg tablet; Take 1 tablet (40 mg total) by mouth daily    Encounter for immunization  -     Pneumococcal Conjugate Vaccine 20-valent (PCV20)    Prostate cancer (HCC)    Short-term memory loss  -     donepezil (ARICEPT) 10 mg tablet; Take 1 tablet (10 mg total) by mouth daily at bedtime    Other orders  -     Calcium Polycarbophil (Fiber) 625 MG TABS; Take 625 mg by mouth daily        Paroxysmal atrial fibrillation (HCC)  Eliquis 5 mg Qday  Metoprolol for  rate control  No concerns at this time    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        Subjective:      Patient ID: Norman Walsh is a 88 y.o. male.    Acute onset of LBP several weeks ago.  The patient notes that it localizes to the area of the para-lumbar muscles on the right.  He was seen by Pain Management and they deferred to our office.  The patient notes no fevers or chills.  He states the pain does not radiate to the lower extremities.  Xray done by [facility] pain management.    Flank Pain  Pertinent negatives include no abdominal pain, chest pain or fever.       The following portions of the patient's history were reviewed and updated as appropriate:   He has a past medical history of Anemia (12/10/2012), BPH with obstruction/lower urinary tract symptoms, Cataract, Dementia (HCC) (mild dementia diagnose 2-3-23d by neurologist), Frequency of micturition, Hypercholesteremia, Hypertension, Memory loss, and Poor urinary stream.,  does not have any pertinent problems on file.,   has a past surgical history that includes Hernia repair and Cataract extraction (Bilateral, 02/2019, 03/2019).,  family history includes Cancer in his sister and sister; Colon cancer in his family; Coronary artery disease in his mother; No Known Problems in his father.,   reports that he has never smoked. He has never used smokeless tobacco. He reports current alcohol use of about 3.0 standard drinks of alcohol per week. He reports that he does not use drugs.,  has No Known Allergies..  Current Outpatient Medications   Medication Sig Dispense Refill   • amLODIPine (NORVASC) 5 mg tablet 5 mg daily   0   • baclofen 10 mg tablet Take 1 tablet (10 mg total) by mouth 3 (three) times a day for 21  "days 63 tablet 0   • Calcium Carbonate-Vitamin D 600-400 MG-UNIT per tablet Take 1 tablet by mouth daily     • Calcium Polycarbophil (Fiber) 625 MG TABS Take 625 mg by mouth daily     • coenzyme Q-10 100 MG capsule Take 1 capsule by mouth daily       • donepezil (ARICEPT) 10 mg tablet Take 1 tablet (10 mg total) by mouth daily at bedtime 90 tablet 1   • Eliquis 5 MG Take 5 mg by mouth 2 (two) times a day     • methylPREDNISolone 4 MG tablet therapy pack Use as directed on package 21 each 0   • metoprolol tartrate (LOPRESSOR) 25 mg tablet take 1 tablet by mouth every 12 hours 180 tablet 1   • Multiple Vitamins-Minerals (CENTRUM ADULTS PO) Take 1 tablet by mouth daily     • Omega-3 1000 MG CAPS Take 1 capsule by mouth 2 (two) times a day       • ramipril (ALTACE) 10 MG capsule Take by mouth daily       • simvastatin (ZOCOR) 40 mg tablet Take 1 tablet (40 mg total) by mouth daily 90 tablet 3   • tadalafil (CIALIS) 20 MG tablet Take 1 tablet (20 mg total) by mouth daily as needed for erectile dysfunction 30 tablet 3     No current facility-administered medications for this visit.       Review of Systems   Constitutional:  Negative for chills, fatigue and fever.   HENT: Negative.     Respiratory:  Negative for cough, chest tightness and shortness of breath.    Cardiovascular:  Negative for chest pain and palpitations.   Gastrointestinal:  Negative for abdominal pain, constipation, diarrhea, nausea and vomiting.   Genitourinary:  Positive for flank pain.   Musculoskeletal:  Positive for back pain and myalgias.   Skin: Negative.    Neurological: Negative.    Psychiatric/Behavioral:  Negative for dysphoric mood. The patient is not nervous/anxious.          Objective:  Vitals:    01/10/24 1039   BP: 138/84   Pulse: 78   Temp: (!) 97.4 °F (36.3 °C)   SpO2: 98%   Weight: 88.5 kg (195 lb)   Height: 5' 10\" (1.778 m)     Body mass index is 27.98 kg/m².     Physical Exam  Vitals and nursing note reviewed.   Constitutional:       " Appearance: He is well-developed.   HENT:      Head: Normocephalic and atraumatic.   Eyes:      Pupils: Pupils are equal, round, and reactive to light.   Cardiovascular:      Rate and Rhythm: Normal rate and regular rhythm.      Heart sounds: Normal heart sounds. No murmur heard.  Pulmonary:      Effort: Pulmonary effort is normal.      Breath sounds: Normal breath sounds. No stridor. No rales.   Abdominal:      General: Bowel sounds are normal. There is no distension.      Palpations: Abdomen is soft.      Tenderness: There is no abdominal tenderness.   Musculoskeletal:         General: Tenderness present. No deformity. Normal range of motion.      Cervical back: Normal range of motion and neck supple.        Back:    Skin:     General: Skin is warm and dry.   Neurological:      Mental Status: He is alert and oriented to person, place, and time.          PHQ-2/9 Depression Screening

## 2024-01-10 NOTE — ASSESSMENT & PLAN NOTE
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

## 2024-02-28 ENCOUNTER — OFFICE VISIT (OUTPATIENT)
Dept: INTERNAL MEDICINE CLINIC | Facility: CLINIC | Age: 89
End: 2024-02-28
Payer: MEDICARE

## 2024-02-28 VITALS
OXYGEN SATURATION: 98 % | BODY MASS INDEX: 27.63 KG/M2 | TEMPERATURE: 97.5 F | WEIGHT: 193 LBS | DIASTOLIC BLOOD PRESSURE: 70 MMHG | HEART RATE: 81 BPM | HEIGHT: 70 IN | SYSTOLIC BLOOD PRESSURE: 110 MMHG

## 2024-02-28 DIAGNOSIS — R19.7 DIARRHEA, UNSPECIFIED TYPE: ICD-10-CM

## 2024-02-28 DIAGNOSIS — R10.84 GENERALIZED ABDOMINAL PAIN: Primary | ICD-10-CM

## 2024-02-28 PROCEDURE — 99213 OFFICE O/P EST LOW 20 MIN: CPT | Performed by: NURSE PRACTITIONER

## 2024-02-28 NOTE — PROGRESS NOTES
Name: Norman Walsh      : 1935      MRN: [id number]  Encounter Provider: VARGAS Hoffmann  Encounter Date: 2024   Encounter department: [facility]    Assessment & Plan     1. Generalized abdominal pain  Assessment & Plan:  Has has had chronic abdominal discomfort for years  He has prostate cancer that they are not treating due to his age  He takes imodium at least twice a day for his soft stools  His caretaker/ wife is wondering why he has abdominal pain  He should follow up with the urologist and his neurologist   It is okay to take another dose or two of the imodium   He denies any pain at this time      2. Diarrhea, unspecified type  Assessment & Plan:  Has chronic soft stools  Takes imodium twice a day              Subjective      The patient is here today to discuss his chronic abdominal pain and loose stools. Please continue to the PORCH section of the note for details of today's visit.        Review of Systems   Constitutional:  Negative for activity change, chills, fatigue and fever.   HENT:  Negative for rhinorrhea and sore throat.    Eyes:  Negative for pain.   Respiratory:  Negative for cough and shortness of breath.    Cardiovascular:  Negative for chest pain, palpitations and leg swelling.   Gastrointestinal:  Negative for abdominal pain, constipation, diarrhea, nausea and vomiting.   Genitourinary:  Negative for difficulty urinating, flank pain, frequency and urgency.   Musculoskeletal:  Negative for gait problem, joint swelling and myalgias.   Skin:  Negative for color change.   Neurological:  Negative for dizziness, weakness, light-headedness and headaches.   Psychiatric/Behavioral:  Negative for sleep disturbance. The patient is not nervous/anxious.    All other systems reviewed and are negative.      Current Outpatient Medications on File Prior to Visit   Medication Sig    amLODIPine (NORVASC) 5 mg tablet 5 mg daily     Calcium Carbonate-Vitamin D 600-400  "MG-UNIT per tablet Take 1 tablet by mouth daily    Calcium Polycarbophil (Fiber) 625 MG TABS Take 625 mg by mouth daily    coenzyme Q-10 100 MG capsule Take 1 capsule by mouth daily      donepezil (ARICEPT) 10 mg tablet Take 1 tablet (10 mg total) by mouth daily at bedtime    Eliquis 5 MG Take 5 mg by mouth 2 (two) times a day    metoprolol tartrate (LOPRESSOR) 25 mg tablet take 1 tablet by mouth every 12 hours    Multiple Vitamins-Minerals (CENTRUM ADULTS PO) Take 1 tablet by mouth daily    Omega-3 1000 MG CAPS Take 1 capsule by mouth 2 (two) times a day      ramipril (ALTACE) 10 MG capsule Take by mouth daily      simvastatin (ZOCOR) 40 mg tablet Take 1 tablet (40 mg total) by mouth daily    tadalafil (CIALIS) 20 MG tablet Take 1 tablet (20 mg total) by mouth daily as needed for erectile dysfunction    baclofen 10 mg tablet Take 1 tablet (10 mg total) by mouth 3 (three) times a day for 21 days    methylPREDNISolone 4 MG tablet therapy pack Use as directed on package (Patient not taking: Reported on 2/28/2024)       Objective     /70   Pulse 81   Temp 97.5 °F (36.4 °C) (Tympanic)   Ht 5' 10\" (1.778 m)   Wt 87.5 kg (193 lb)   SpO2 98%   BMI 27.69 kg/m²     Physical Exam  Vitals reviewed.   Constitutional:       General: He is awake.      Appearance: Normal appearance. He is well-developed and normal weight.   HENT:      Head: Normocephalic and atraumatic.      Nose: Nose normal.      Mouth/Throat:      Mouth: Mucous membranes are moist.   Eyes:      Extraocular Movements: Extraocular movements intact.   Cardiovascular:      Rate and Rhythm: Normal rate and regular rhythm.      Pulses: Normal pulses.      Heart sounds: Normal heart sounds.   Pulmonary:      Effort: Pulmonary effort is normal.      Breath sounds: Normal breath sounds.   Abdominal:      General: Bowel sounds are normal.      Palpations: Abdomen is soft.      Tenderness: There is no abdominal tenderness.      Comments: Soft stools  "   Musculoskeletal:         General: Normal range of motion.      Cervical back: Normal range of motion.      Right lower leg: No edema.      Left lower leg: No edema.   Skin:     General: Skin is warm and dry.   Neurological:      Mental Status: He is alert and oriented to person, place, and time.   Psychiatric:         Attention and Perception: Attention normal.         Mood and Affect: Mood normal.         Speech: Speech normal.         Behavior: Behavior normal. Behavior is cooperative.       VARGAS Hoffmann

## 2024-02-28 NOTE — ASSESSMENT & PLAN NOTE
Has has had chronic abdominal discomfort for years  He has prostate cancer that they are not treating due to his age  He takes imodium at least twice a day for his soft stools  His caretaker/ wife is wondering why he has abdominal pain  He should follow up with the urologist and his neurologist   It is okay to take another dose or two of the imodium   He denies any pain at this time

## 2024-03-26 ENCOUNTER — OFFICE VISIT (OUTPATIENT)
Dept: INTERNAL MEDICINE CLINIC | Facility: CLINIC | Age: 89
End: 2024-03-26
Payer: MEDICARE

## 2024-03-26 VITALS
HEART RATE: 71 BPM | DIASTOLIC BLOOD PRESSURE: 82 MMHG | WEIGHT: 191.2 LBS | BODY MASS INDEX: 27.37 KG/M2 | SYSTOLIC BLOOD PRESSURE: 128 MMHG | OXYGEN SATURATION: 99 % | TEMPERATURE: 97.3 F | HEIGHT: 70 IN

## 2024-03-26 DIAGNOSIS — I48.0 PAROXYSMAL ATRIAL FIBRILLATION (HCC): ICD-10-CM

## 2024-03-26 DIAGNOSIS — I10 BENIGN ESSENTIAL HYPERTENSION: ICD-10-CM

## 2024-03-26 DIAGNOSIS — M54.50 CHRONIC RIGHT-SIDED LOW BACK PAIN WITHOUT SCIATICA: ICD-10-CM

## 2024-03-26 DIAGNOSIS — E78.5 HYPERLIPIDEMIA, UNSPECIFIED HYPERLIPIDEMIA TYPE: ICD-10-CM

## 2024-03-26 DIAGNOSIS — G89.29 CHRONIC RIGHT-SIDED LOW BACK PAIN WITHOUT SCIATICA: ICD-10-CM

## 2024-03-26 DIAGNOSIS — Z00.00 MEDICARE ANNUAL WELLNESS VISIT, SUBSEQUENT: Primary | ICD-10-CM

## 2024-03-26 DIAGNOSIS — Z12.5 SCREENING PSA (PROSTATE SPECIFIC ANTIGEN): ICD-10-CM

## 2024-03-26 PROCEDURE — 99214 OFFICE O/P EST MOD 30 MIN: CPT | Performed by: INTERNAL MEDICINE

## 2024-03-26 PROCEDURE — G0439 PPPS, SUBSEQ VISIT: HCPCS | Performed by: INTERNAL MEDICINE

## 2024-03-26 RX ORDER — MEMANTINE HYDROCHLORIDE 5 MG/1
5 TABLET ORAL 2 TIMES DAILY
COMMUNITY
Start: 2024-02-29

## 2024-03-26 RX ORDER — SIMVASTATIN 40 MG
40 TABLET ORAL DAILY
Qty: 90 TABLET | Refills: 3 | Status: SHIPPED | OUTPATIENT
Start: 2024-03-26 | End: 2025-03-21

## 2024-03-26 NOTE — PROGRESS NOTES
Assessment and Plan:     Problem List Items Addressed This Visit     Benign essential hypertension     BP controlled and we will continue to follow         Relevant Medications    metoprolol tartrate (LOPRESSOR) 25 mg tablet    Other Relevant Orders    Comprehensive metabolic panel    Lipid Panel with Direct LDL reflex    CBC and differential    Paroxysmal atrial fibrillation (HCC)     Continue the Eliquis 5 mg BID  No falls recently  Continue the Metoprolol for rate control.         Relevant Medications    metoprolol tartrate (LOPRESSOR) 25 mg tablet    Low back pain - Primary     [facility] program for conditioning and increased flexibility of the lumbar region.         Relevant Orders    Ambulatory Referral to Medical Fitness Exercise Specialist   Other Visit Diagnoses     Hyperlipidemia, unspecified hyperlipidemia type        Relevant Medications    simvastatin (ZOCOR) 40 mg tablet    Screening PSA (prostate specific antigen)        Relevant Orders    PSA, Total Screen           Preventive health issues were discussed with patient, and age appropriate screening tests were ordered as noted in patient's After Visit Summary.  Personalized health advice and appropriate referrals for health education or preventive services given if needed, as noted in patient's After Visit Summary.     History of Present Illness:     Patient presents for a Medicare Wellness Visit    The patient's wife is concerned that the patient needs help with continuing his current level of activity and would like to follow up with the [facility] Program.  The patient is noted to have issues with excessive ear wax would like to have that checked.  He has issues with right sided paravertebral muscle spasm.       Patient Care Team:  Benjamín Montaño DO as PCP - General (Internal Medicine)  Bart Barry MD (Cardiology)  David Patel MD (Urology)  Dmitry Soriano MD (Urology)     Review of Systems:     Review of Systems   Constitutional:  Negative for  chills, fatigue and fever.   HENT: Negative.     Respiratory:  Negative for cough, chest tightness and shortness of breath.    Cardiovascular:  Negative for chest pain and palpitations.   Gastrointestinal:  Negative for abdominal pain, constipation, diarrhea, nausea and vomiting.   Genitourinary: Negative.    Musculoskeletal:  Negative for back pain and myalgias.   Skin: Negative.    Neurological: Negative.    Psychiatric/Behavioral:  Negative for dysphoric mood. The patient is not nervous/anxious.         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)   • Accidental bee sting   • BPH with obstruction/lower urinary tract symptoms   • Elevated PSA   • Lumbar radiculopathy   • Generalized abdominal pain   • Diarrhea      Past Medical and Surgical History:     Past Medical History:   Diagnosis Date   • Anemia 12/10/2012   • BPH with obstruction/lower urinary tract symptoms    • Cataract    • Dementia (HCC) mild dementia diagnose 2-3-23d by neurologist   • Frequency of micturition    • Hypercholesteremia    • Hypertension    • Memory loss    • Poor urinary stream      Past Surgical History:   Procedure Laterality Date   • CATARACT EXTRACTION Bilateral 2019, 2019   • HERNIA REPAIR        Family History:     Family History   Problem Relation Age of Onset   • Coronary artery disease Mother         , heart   • No Known Problems Father    • Colon  cancer Family    • Cancer Sister    • Cancer Sister       Social History:     Social History     Socioeconomic History   • Marital status: /Civil Union     Spouse name: None   • Number of children: None   • Years of education: None   • Highest education level: None   Occupational History   • Occupation: RETIRED   Tobacco Use   • Smoking status: Never     Passive exposure: Never   • Smokeless tobacco: Never   Vaping Use   • Vaping status: Never Used   Substance and Sexual Activity   • Alcohol use: Yes     Alcohol/week: 3.0 standard drinks of alcohol     Types: 1 Glasses of wine, 2 Cans of beer per week     Comment: weekly   • Drug use: No   • Sexual activity: Yes     Partners: Female     Birth control/protection: None   Other Topics Concern   • None   Social History Narrative        RETIRED     Social Determinants of Health     Financial Resource Strain: Low Risk  (3/22/2023)    Overall Financial Resource Strain (CARDIA)    • Difficulty of Paying Living Expenses: Not hard at all   Food Insecurity: No Food Insecurity (3/26/2024)    Hunger Vital Sign    • Worried About Running Out of Food in the Last Year: Never true    • Ran Out of Food in the Last Year: Never true   Transportation Needs: No Transportation Needs (3/26/2024)    PRAPARE - Transportation    • Lack of Transportation (Medical): No    • Lack of Transportation (Non-Medical): No   Physical Activity: Not on file   Stress: Not on file   Social Connections: Not on file   Intimate Partner Violence: Not on file   Housing Stability: Low Risk  (3/26/2024)    Housing Stability Vital Sign    • Unable to Pay for Housing in the Last Year: No    • Number of Places Lived in the Last Year: 1    • Unstable Housing in the Last Year: No      Medications and Allergies:     Current Outpatient Medications   Medication Sig Dispense Refill   • Calcium Carbonate-Vitamin D 600-400 MG-UNIT per tablet Take 1 tablet by mouth daily     • Calcium Polycarbophil (Fiber) 625  MG TABS Take 625 mg by mouth daily     • coenzyme Q-10 100 MG capsule Take 1 capsule by mouth daily       • donepezil (ARICEPT) 10 mg tablet Take 1 tablet (10 mg total) by mouth daily at bedtime 90 tablet 1   • Eliquis 5 MG Take 5 mg by mouth 2 (two) times a day     • memantine (NAMENDA) 5 mg tablet Take 5 mg by mouth 2 (two) times a day     • metoprolol tartrate (LOPRESSOR) 25 mg tablet Take 1 tablet (25 mg total) by mouth every 12 (twelve) hours 180 tablet 3   • Multiple Vitamins-Minerals (CENTRUM ADULTS PO) Take 1 tablet by mouth daily     • Omega-3 1000 MG CAPS Take 1 capsule by mouth 2 (two) times a day       • ramipril (ALTACE) 10 MG capsule Take by mouth daily       • simvastatin (ZOCOR) 40 mg tablet Take 1 tablet (40 mg total) by mouth daily 90 tablet 3   • tadalafil (CIALIS) 20 MG tablet Take 1 tablet (20 mg total) by mouth daily as needed for erectile dysfunction 30 tablet 3   • amLODIPine (NORVASC) 5 mg tablet 5 mg daily   0     No current facility-administered medications for this visit.     No Known Allergies   Immunizations:     Immunization History   Administered Date(s) Administered   • COVID-19 MODERNA VACC 0.5 ML IM 01/20/2021, 02/17/2021, 12/03/2021   • INFLUENZA 09/30/2015, 10/05/2016, 10/09/2017, 09/07/2018, 10/15/2018, 10/03/2019, 09/23/2020, 09/15/2021, 09/21/2022, 09/17/2023   • Influenza Quadrivalent Preservative Free 3 years and older IM 09/30/2015   • Influenza Split High Dose Preservative Free IM 10/05/2016, 10/09/2017   • Influenza, high dose seasonal 0.7 mL 09/23/2020, 09/15/2021, 09/21/2022   • Influenza, seasonal, injectable 10/03/2012, 10/02/2013   • Pneumococcal Conjugate 13-Valent 03/21/2016, 04/05/2017   • Tdap 03/02/2015   • Zoster Vaccine Recombinant 10/16/2018   • influenza, trivalent, adjuvanted 09/30/2019      Health Maintenance:     There are no preventive care reminders to display for this patient.      Topic Date Due   • Pneumococcal Vaccine: 65+ Years (2 of 2 - PPSV23 or  PCV20) 04/05/2018   • COVID-19 Vaccine (4 - 2023-24 season) 09/01/2023      Medicare Screening Tests and Risk Assessments:     Norman is here for his Subsequent Wellness visit. Last Medicare Wellness visit information reviewed, patient interviewed and updates made to the record today.      Health Risk Assessment:   Patient rates overall health as good. Patient feels that their physical health rating is same. Patient is satisfied with their life. Eyesight was rated as same. Hearing was rated as same. Patient feels that their emotional and mental health rating is same. Patients states they are never, rarely angry. Patient states they are sometimes unusually tired/fatigued. Pain experienced in the last 7 days has been none. Patient states that he has experienced no weight loss or gain in last 6 months.     Depression Screening:   PHQ-2 Score: 0      Fall Risk Screening:   In the past year, patient has experienced: no history of falling in past year      Home Safety:  Patient does not have trouble with stairs inside or outside of their home. Patient has working smoke alarms and has working carbon monoxide detector. Home safety hazards include: none.     Nutrition:   Current diet is Regular.     Medications:   Patient is not currently taking any over-the-counter supplements. Patient is not able to manage medications.     Activities of Daily Living (ADLs)/Instrumental Activities of Daily Living (IADLs):   Walk and transfer into and out of bed and chair?: Yes  Dress and groom yourself?: Yes    Bathe or shower yourself?: Yes    Feed yourself? Yes  Do your laundry/housekeeping?: Yes  Manage your money, pay your bills and track your expenses?: No  Make your own meals?: Yes    Do your own shopping?: Yes    Previous Hospitalizations:   Any hospitalizations or ED visits within the last 12 months?: No      Advance Care Planning:   Living will: Yes    Durable POA for healthcare: Yes    Advanced directive: Yes    Advanced directive  "counseling given: No    Five wishes given: No    Patient declined ACP directive: No    End of Life Decisions reviewed with patient: No    Provider agrees with end of life decisions: Yes      Cognitive Screening:   Provider or family/friend/caregiver concerned regarding cognition?: No    PREVENTIVE SCREENINGS      Cardiovascular Screening:    General: Screening Not Indicated and History Lipid Disorder    Due for: Lipid Panel      Diabetes Screening:     General: Screening Current    Due for: Blood Glucose      Colorectal Cancer Screening:     General: Screening Not Indicated      Prostate Cancer Screening:    General: History Prostate Cancer and Screening Not Indicated    Due for: PSA      Osteoporosis Screening:    General: Screening Not Indicated      Abdominal Aortic Aneurysm (AAA) Screening:        General: Screening Not Indicated      Lung Cancer Screening:     General: Screening Not Indicated      Hepatitis C Screening:    General: Screening Not Indicated    Screening, Brief Intervention, and Referral to Treatment (SBIRT)    Screening  Typical number of drinks in a day: 0  Typical number of drinks in a week: 0  Interpretation: Low risk drinking behavior.    Single Item Drug Screening:  How often have you used an illegal drug (including marijuana) or a prescription medication for non-medical reasons in the past year? never    Single Item Drug Screen Score: 0  Interpretation: Negative screen for possible drug use disorder    No results found.     Physical Exam:     /82   Pulse 71   Temp (!) 97.3 °F (36.3 °C)   Ht 5' 10\" (1.778 m)   Wt 86.7 kg (191 lb 3.2 oz)   SpO2 99%   BMI 27.43 kg/m²     Physical Exam  Vitals and nursing note reviewed.   Constitutional:       General: He is not in acute distress.     Appearance: He is well-developed.   HENT:      Head: Normocephalic and atraumatic.   Eyes:      Conjunctiva/sclera: Conjunctivae normal.   Cardiovascular:      Rate and Rhythm: Normal rate and regular " rhythm.      Heart sounds: No murmur heard.  Pulmonary:      Effort: Pulmonary effort is normal. No respiratory distress.      Breath sounds: Normal breath sounds.   Abdominal:      Palpations: Abdomen is soft.      Tenderness: There is no abdominal tenderness.   Musculoskeletal:         General: No swelling.      Cervical back: Neck supple.   Skin:     General: Skin is warm and dry.      Capillary Refill: Capillary refill takes less than 2 seconds.   Neurological:      Mental Status: He is alert.   Psychiatric:         Mood and Affect: Mood normal.          Benjamín Montaño, DO

## 2024-03-26 NOTE — PATIENT INSTRUCTIONS
Medicare Preventive Visit Patient Instructions  Thank you for completing your Welcome to Medicare Visit or Medicare Annual Wellness Visit today. Your next wellness visit will be due in one year (3/27/2025).  The screening/preventive services that you may require over the next 5-10 years are detailed below. Some tests may not apply to you based off risk factors and/or age. Screening tests ordered at today's visit but not completed yet may show as past due. Also, please note that scanned in results may not display below.  Preventive Screenings:  Service Recommendations Previous Testing/Comments   Colorectal Cancer Screening  Colonoscopy    Fecal Occult Blood Test (FOBT)/Fecal Immunochemical Test (FIT)  Fecal DNA/Cologuard Test  Flexible Sigmoidoscopy Age: 45-75 years old   Colonoscopy: every 10 years (May be performed more frequently if at higher risk)  OR  FOBT/FIT: every 1 year  OR  Cologuard: every 3 years  OR  Sigmoidoscopy: every 5 years  Screening may be recommended earlier than age 45 if at higher risk for colorectal cancer. Also, an individualized decision between you and your healthcare provider will decide whether screening between the ages of 76-85 would be appropriate. Colonoscopy: Not on file  FOBT/FIT: Not on file  Cologuard: Not on file  Sigmoidoscopy: Not on file    Screening Not Indicated     Prostate Cancer Screening Individualized decision between patient and health care provider in men between ages of 55-69   Medicare will cover every 12 months beginning on the day after your 50th birthday PSA: 6.27 ng/mL     History Prostate Cancer  Screening Not Indicated     Hepatitis C Screening Once for adults born between 1945 and 1965  More frequently in patients at high risk for Hepatitis C Hep C Antibody: Not on file        Diabetes Screening 1-2 times per year if you're at risk for diabetes or have pre-diabetes Fasting glucose: 100 mg/dL (7/20/2020)  A1C: No results in last 5 years (No results in last 5  years)  Screening Current   Cholesterol Screening Once every 5 years if you don't have a lipid disorder. May order more often based on risk factors. Lipid panel: 06/12/2023  Screening Not Indicated  History Lipid Disorder      Other Preventive Screenings Covered by Medicare:  Abdominal Aortic Aneurysm (AAA) Screening: covered once if your at risk. You're considered to be at risk if you have a family history of AAA or a male between the age of 65-75 who smoking at least 100 cigarettes in your lifetime.  Lung Cancer Screening: covers low dose CT scan once per year if you meet all of the following conditions: (1) Age 55-77; (2) No signs or symptoms of lung cancer; (3) Current smoker or have quit smoking within the last 15 years; (4) You have a tobacco smoking history of at least 20 pack years (packs per day x number of years you smoked); (5) You get a written order from a healthcare provider.  Glaucoma Screening: covered annually if you're considered high risk: (1) You have diabetes OR (2) Family history of glaucoma OR (3)  aged 50 and older OR (4)  American aged 65 and older  Osteoporosis Screening: covered every 2 years if you meet one of the following conditions: (1) Have a vertebral abnormality; (2) On glucocorticoid therapy for more than 3 months; (3) Have primary hyperparathyroidism; (4) On osteoporosis medications and need to assess response to drug therapy.  HIV Screening: covered annually if you're between the age of 15-65. Also covered annually if you are younger than 15 and older than 65 with risk factors for HIV infection. For pregnant patients, it is covered up to 3 times per pregnancy.    Immunizations:  Immunization Recommendations   Influenza Vaccine Annual influenza vaccination during flu season is recommended for all persons aged >= 6 months who do not have contraindications   Pneumococcal Vaccine   * Pneumococcal conjugate vaccine = PCV13 (Prevnar 13), PCV15 (Vaxneuvance),  PCV20 (Prevnar 20)  * Pneumococcal polysaccharide vaccine = PPSV23 (Pneumovax) Adults 19-63 yo with certain risk factors or if 65+ yo  If never received any pneumonia vaccine: recommend Prevnar 20 (PCV20)  Give PCV20 if previously received 1 dose of PCV13 or PPSV23   Hepatitis B Vaccine 3 dose series if at intermediate or high risk (ex: diabetes, end stage renal disease, liver disease)   Respiratory syncytial virus (RSV) Vaccine - COVERED BY MEDICARE PART D  * RSVPreF3 (Arexvy) CDC recommends that adults 60 years of age and older may receive a single dose of RSV vaccine using shared clinical decision-making (SCDM)   Tetanus (Td) Vaccine - COST NOT COVERED BY MEDICARE PART B Following completion of primary series, a booster dose should be given every 10 years to maintain immunity against tetanus. Td may also be given as tetanus wound prophylaxis.   Tdap Vaccine - COST NOT COVERED BY MEDICARE PART B Recommended at least once for all adults. For pregnant patients, recommended with each pregnancy.   Shingles Vaccine (Shingrix) - COST NOT COVERED BY MEDICARE PART B  2 shot series recommended in those 19 years and older who have or will have weakened immune systems or those 50 years and older     Health Maintenance Due:  There are no preventive care reminders to display for this patient.  Immunizations Due:      Topic Date Due   • Pneumococcal Vaccine: 65+ Years (2 of 2 - PPSV23 or PCV20) 04/05/2018   • COVID-19 Vaccine (4 - 2023-24 season) 09/01/2023     Advance Directives   What are advance directives?  Advance directives are legal documents that state your wishes and plans for medical care. These plans are made ahead of time in case you lose your ability to make decisions for yourself. Advance directives can apply to any medical decision, such as the treatments you want, and if you want to donate organs.   What are the types of advance directives?  There are many types of advance directives, and each state has rules  about how to use them. You may choose a combination of any of the following:  Living will:  This is a written record of the treatment you want. You can also choose which treatments you do not want, which to limit, and which to stop at a certain time. This includes surgery, medicine, IV fluid, and tube feedings.   Durable power of  for healthcare (DPAHC):  This is a written record that states who you want to make healthcare choices for you when you are unable to make them for yourself. This person, called a proxy, is usually a family member or a friend. You may choose more than 1 proxy.  Do not resuscitate (DNR) order:  A DNR order is used in case your heart stops beating or you stop breathing. It is a request not to have certain forms of treatment, such as CPR. A DNR order may be included in other types of advance directives.  Medical directive:  This covers the care that you want if you are in a coma, near death, or unable to make decisions for yourself. You can list the treatments you want for each condition. Treatment may include pain medicine, surgery, blood transfusions, dialysis, IV or tube feedings, and a ventilator (breathing machine).  Values history:  This document has questions about your views, beliefs, and how you feel and think about life. This information can help others choose the care that you would choose.  Why are advance directives important?  An advance directive helps you control your care. Although spoken wishes may be used, it is better to have your wishes written down. Spoken wishes can be misunderstood, or not followed. Treatments may be given even if you do not want them. An advance directive may make it easier for your family to make difficult choices about your care.   Weight Management   Why it is important to manage your weight:  Being overweight increases your risk of health conditions such as heart disease, high blood pressure, type 2 diabetes, and certain types of cancer. It  can also increase your risk for osteoarthritis, sleep apnea, and other respiratory problems. Aim for a slow, steady weight loss. Even a small amount of weight loss can lower your risk of health problems.  How to lose weight safely:  A safe and healthy way to lose weight is to eat fewer calories and get regular exercise. You can lose up about 1 pound a week by decreasing the number of calories you eat by 500 calories each day.   Healthy meal plan for weight management:  A healthy meal plan includes a variety of foods, contains fewer calories, and helps you stay healthy. A healthy meal plan includes the following:  Eat whole-grain foods more often.  A healthy meal plan should contain fiber. Fiber is the part of grains, fruits, and vegetables that is not broken down by your body. Whole-grain foods are healthy and provide extra fiber in your diet. Some examples of whole-grain foods are whole-wheat breads and pastas, oatmeal, brown rice, and bulgur.  Eat a variety of vegetables every day.  Include dark, leafy greens such as spinach, kale, nazario greens, and mustard greens. Eat yellow and orange vegetables such as carrots, sweet potatoes, and winter squash.   Eat a variety of fruits every day.  Choose fresh or canned fruit (canned in its own juice or light syrup) instead of juice. Fruit juice has very little or no fiber.  Eat low-fat dairy foods.  Drink fat-free (skim) milk or 1% milk. Eat fat-free yogurt and low-fat cottage cheese. Try low-fat cheeses such as mozzarella and other reduced-fat cheeses.  Choose meat and other protein foods that are low in fat.  Choose beans or other legumes such as split peas or lentils. Choose fish, skinless poultry (chicken or turkey), or lean cuts of red meat (beef or pork). Before you cook meat or poultry, cut off any visible fat.   Use less fat and oil.  Try baking foods instead of frying them. Add less fat, such as margarine, sour cream, regular salad dressing and mayonnaise to  foods. Eat fewer high-fat foods. Some examples of high-fat foods include french fries, doughnuts, ice cream, and cakes.  Eat fewer sweets.  Limit foods and drinks that are high in sugar. This includes candy, cookies, regular soda, and sweetened drinks.  Exercise:  Exercise at least 30 minutes per day on most days of the week. Some examples of exercise include walking, biking, dancing, and swimming. You can also fit in more physical activity by taking the stairs instead of the elevator or parking farther away from stores. Ask your healthcare provider about the best exercise plan for you.      © Copyright [employer] 2018 Information is for End User's use only and may not be sold, redistributed or otherwise used for commercial purposes. All illustrations and images included in CareNotes® are the copyrighted property of [employer] or [employer]

## 2024-05-14 ENCOUNTER — RA CDI HCC (OUTPATIENT)
Dept: OTHER | Facility: HOSPITAL | Age: 89
End: 2024-05-14

## 2024-05-14 PROBLEM — U07.1 COVID-19 VIRUS DETECTED: Status: RESOLVED | Noted: 2022-08-08 | Resolved: 2024-05-14

## 2024-05-14 PROBLEM — T63.441A: Status: RESOLVED | Noted: 2023-09-12 | Resolved: 2024-05-14

## 2024-05-14 PROBLEM — R61 EXCESSIVE SWEATING: Status: RESOLVED | Noted: 2019-03-29 | Resolved: 2024-05-14

## 2024-05-21 ENCOUNTER — OFFICE VISIT (OUTPATIENT)
Dept: INTERNAL MEDICINE CLINIC | Facility: CLINIC | Age: 89
End: 2024-05-21
Payer: MEDICARE

## 2024-05-21 VITALS
TEMPERATURE: 97.2 F | BODY MASS INDEX: 27.09 KG/M2 | DIASTOLIC BLOOD PRESSURE: 74 MMHG | SYSTOLIC BLOOD PRESSURE: 122 MMHG | OXYGEN SATURATION: 97 % | HEIGHT: 70 IN | WEIGHT: 189.25 LBS | HEART RATE: 64 BPM

## 2024-05-21 DIAGNOSIS — I48.0 PAROXYSMAL ATRIAL FIBRILLATION (HCC): ICD-10-CM

## 2024-05-21 DIAGNOSIS — Z23 ENCOUNTER FOR IMMUNIZATION: ICD-10-CM

## 2024-05-21 DIAGNOSIS — G30.1 LATE ONSET ALZHEIMER'S DEMENTIA WITHOUT BEHAVIORAL DISTURBANCE (HCC): ICD-10-CM

## 2024-05-21 DIAGNOSIS — F02.80 LATE ONSET ALZHEIMER'S DEMENTIA WITHOUT BEHAVIORAL DISTURBANCE (HCC): ICD-10-CM

## 2024-05-21 DIAGNOSIS — M54.50 ACUTE RIGHT-SIDED LOW BACK PAIN WITHOUT SCIATICA: Primary | ICD-10-CM

## 2024-05-21 DIAGNOSIS — I10 BENIGN ESSENTIAL HYPERTENSION: ICD-10-CM

## 2024-05-21 PROCEDURE — G2211 COMPLEX E/M VISIT ADD ON: HCPCS | Performed by: INTERNAL MEDICINE

## 2024-05-21 PROCEDURE — 99214 OFFICE O/P EST MOD 30 MIN: CPT | Performed by: INTERNAL MEDICINE

## 2024-05-21 RX ORDER — PREDNISONE 20 MG/1
TABLET ORAL
Qty: 20 TABLET | Refills: 0 | Status: SHIPPED | OUTPATIENT
Start: 2024-05-21

## 2024-05-21 NOTE — ASSESSMENT & PLAN NOTE
The patient was seen and examined and noted to have issues with an elevated BP on rooming.  This improved with recheck  Continue the Amlodipine, Ramipril and Metoprolol

## 2024-05-21 NOTE — PROGRESS NOTES
Assessment/Plan:    Problem List Items Addressed This Visit     Benign essential hypertension     The patient was seen and examined and noted to have issues with an elevated BP on rooming.  This improved with recheck  Continue the Amlodipine, Ramipril and Metoprolol         Paroxysmal atrial fibrillation (HCC)     Eliquis for anticoagulation   Metoprolol for rated control         Low back pain - Primary     Trial of a Prednisone taper.         Relevant Medications    predniSONE 20 mg tablet    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)     The patient is no Aricept 10 mg Qday  Namenda stopped        Other Visit Diagnoses     Encounter for immunization               Diagnoses and all orders for this visit:    Acute right-sided low back pain without sciatica  -     predniSONE 20 mg tablet; 3 Tabs PO QDay x 3 Days, Then 2 Tabs PO QDay x 3 Days, Then 1 Tab PO QDay x 3 Days, Then 1/2 Tab PO QDay x 4 Days, Then Stop    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)    Benign essential hypertension    Paroxysmal atrial fibrillation (HCC)    Encounter for immunization  -     Cancel: Pneumococcal Conjugate Vaccine 20-valent (Pcv20)        Benign essential hypertension  The patient was seen and examined and noted to have issues with an elevated BP on rooming.  This improved with recheck  Continue the Amlodipine, Ramipril and Metoprolol    Paroxysmal atrial fibrillation (HCC)  Eliquis for anticoagulation   Metoprolol for rated control    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)  The patient is no Aricept 10 mg Qday  Namenda stopped    Low back pain  Trial of a Prednisone taper.        Subjective:      Patient ID: Norman Walsh is a 88 y.o. male.    The patient was seen and examined and noted to have been placed on Aricept and is stable on the medication.  The patient MRI was reviewed and noted to be stable.  The patient is noted to have issues with  lumbar spine pain.  The patient notes no other issues at this time.  There was no injury.        The following portions of the patient's history were reviewed and updated as appropriate:   He has a past medical history of Accidental bee sting (09/12/2023), Anemia (12/10/2012), BPH with obstruction/lower urinary tract symptoms, Cataract, Dementia (HCC) (mild dementia diagnose 2-3-23d by neurologist), Excessive sweating (03/29/2019), Frequency of micturition, Hypercholesteremia, Hypertension, Memory loss, and Poor urinary stream.,  does not have any pertinent problems on file.,   has a past surgical history that includes Hernia repair and Cataract extraction (Bilateral, 02/2019, 03/2019).,  family history includes Cancer in his sister and sister; Colon cancer in his family; Coronary artery disease in his mother; No Known Problems in his father.,   reports that he has never smoked. He has never been exposed to tobacco smoke. He has never used smokeless tobacco. He reports current alcohol use of about 3.0 standard drinks of alcohol per week. He reports that he does not use drugs.,  has No Known Allergies..  Current Outpatient Medications   Medication Sig Dispense Refill   • amLODIPine (NORVASC) 5 mg tablet 5 mg daily   0   • Calcium Carbonate-Vitamin D 600-400 MG-UNIT per tablet Take 1 tablet by mouth daily     • donepezil (ARICEPT) 10 mg tablet Take 1 tablet (10 mg total) by mouth daily at bedtime 90 tablet 1   • Eliquis 5 MG Take 5 mg by mouth 2 (two) times a day     • metoprolol tartrate (LOPRESSOR) 25 mg tablet Take 1 tablet (25 mg total) by mouth every 12 (twelve) hours 180 tablet 3   • Multiple Vitamins-Minerals (CENTRUM ADULTS PO) Take 1 tablet by mouth daily     • Omega-3 1000 MG CAPS Take 1 capsule by mouth 2 (two) times a day       • predniSONE 20 mg tablet 3 Tabs PO QDay x 3 Days, Then 2 Tabs PO QDay x 3 Days, Then 1 Tab PO QDay x 3 Days, Then 1/2 Tab PO QDay x 4 Days, Then Stop 20 tablet 0   • ramipril  "(ALTACE) 10 MG capsule Take by mouth daily       • simvastatin (ZOCOR) 40 mg tablet Take 1 tablet (40 mg total) by mouth daily 90 tablet 3   • tadalafil (CIALIS) 20 MG tablet Take 1 tablet (20 mg total) by mouth daily as needed for erectile dysfunction 30 tablet 3     No current facility-administered medications for this visit.       Review of Systems   Constitutional:  Negative for chills, fatigue and fever.   HENT: Negative.     Respiratory:  Negative for cough, chest tightness and shortness of breath.    Cardiovascular:  Negative for chest pain and palpitations.   Gastrointestinal:  Negative for abdominal pain, constipation, diarrhea, nausea and vomiting.   Genitourinary: Negative.    Musculoskeletal:  Positive for arthralgias. Negative for back pain and myalgias.   Skin: Negative.    Neurological: Negative.    Psychiatric/Behavioral:  Negative for dysphoric mood. The patient is not nervous/anxious.          Objective:  Vitals:    05/21/24 0911   BP: 122/74   BP Location: Right arm   Patient Position: Sitting   Pulse: 64   Temp: (!) 97.2 °F (36.2 °C)   TempSrc: Tympanic   SpO2: 97%   Weight: 85.8 kg (189 lb 4 oz)   Height: 5' 10\" (1.778 m)     Body mass index is 27.15 kg/m².     Physical Exam  Vitals and nursing note reviewed.   Constitutional:       Appearance: He is well-developed.   HENT:      Head: Normocephalic and atraumatic.   Eyes:      Pupils: Pupils are equal, round, and reactive to light.   Cardiovascular:      Rate and Rhythm: Normal rate and regular rhythm.      Heart sounds: Normal heart sounds. No murmur heard.  Pulmonary:      Effort: Pulmonary effort is normal.      Breath sounds: Normal breath sounds. No stridor. No rales.   Abdominal:      General: Bowel sounds are normal. There is no distension.      Palpations: Abdomen is soft.      Tenderness: There is no abdominal tenderness.   Musculoskeletal:         General: Tenderness present. No deformity. Normal range of motion.      Cervical back: " Normal range of motion and neck supple.   Skin:     General: Skin is warm and dry.   Neurological:      Mental Status: He is alert and oriented to person, place, and time.          PHQ-2/9 Depression Screening

## 2024-07-25 DIAGNOSIS — R41.3 SHORT-TERM MEMORY LOSS: ICD-10-CM

## 2024-07-25 RX ORDER — DONEPEZIL HYDROCHLORIDE 10 MG/1
10 TABLET, FILM COATED ORAL
Qty: 200 TABLET | Refills: 1 | Status: SHIPPED | OUTPATIENT
Start: 2024-07-25

## 2024-08-30 LAB
ALBUMIN SERPL-MCNC: 4.3 G/DL (ref 3.5–5.7)
ALP SERPL-CCNC: 46 U/L (ref 35–120)
ALT SERPL-CCNC: 11 U/L
ANION GAP SERPL CALCULATED.3IONS-SCNC: 10 MMOL/L (ref 3–11)
AST SERPL-CCNC: 15 U/L
BASOPHILS # BLD AUTO: 0 THOU/CMM (ref 0–0.1)
BASOPHILS NFR BLD AUTO: 1 %
BILIRUB SERPL-MCNC: 0.9 MG/DL (ref 0.2–1)
BUN SERPL-MCNC: 11 MG/DL (ref 7–28)
CALCIUM SERPL-MCNC: 9.7 MG/DL (ref 8.5–10.1)
CHLORIDE SERPL-SCNC: 104 MMOL/L (ref 100–109)
CHOLEST SERPL-MCNC: 139 MG/DL
CHOLEST/HDLC SERPL: 3.3 {RATIO}
CO2 SERPL-SCNC: 31 MMOL/L (ref 21–31)
CREAT SERPL-MCNC: 0.96 MG/DL (ref 0.53–1.3)
CYTOLOGY CMNT CVX/VAG CYTO-IMP: ABNORMAL
DIFFERENTIAL METHOD BLD: ABNORMAL
EOSINOPHIL # BLD AUTO: 0.2 THOU/CMM (ref 0–0.5)
EOSINOPHIL NFR BLD AUTO: 3 %
ERYTHROCYTE [DISTWIDTH] IN BLOOD BY AUTOMATED COUNT: 13.5 % (ref 12–16)
GFR/BSA.PRED SERPLBLD CYS-BASED-ARV: 76 ML/MIN/{1.73_M2}
GLUCOSE SERPL-MCNC: 104 MG/DL (ref 65–99)
HCT VFR BLD AUTO: 44.2 % (ref 37–48)
HDLC SERPL-MCNC: 42 MG/DL (ref 23–92)
HGB BLD-MCNC: 15.5 G/DL (ref 12.5–17)
LDLC SERPL CALC-MCNC: 79 MG/DL
LYMPHOCYTES # BLD AUTO: 1.6 THOU/CMM (ref 1–3)
LYMPHOCYTES NFR BLD AUTO: 21 %
MCH RBC QN AUTO: 33.3 PG (ref 27–36)
MCHC RBC AUTO-ENTMCNC: 35 G/DL (ref 32–37)
MCV RBC AUTO: 95 FL (ref 80–100)
MONOCYTES # BLD AUTO: 0.5 THOU/CMM (ref 0.3–1)
MONOCYTES NFR BLD AUTO: 7 %
NEUTROPHILS # BLD AUTO: 5 THOU/CMM (ref 1.8–7.8)
NEUTROPHILS NFR BLD AUTO: 68 %
NONHDLC SERPL-MCNC: 97 MG/DL
PLATELET # BLD AUTO: 220 THOU/CMM (ref 140–350)
PMV BLD REES-ECKER: 7 FL (ref 7.5–11.3)
POTASSIUM SERPL-SCNC: 4 MMOL/L (ref 3.5–5.2)
PROT SERPL-MCNC: 7.1 G/DL (ref 6.3–8.3)
PSA SERPL-MCNC: 7.89 NG/ML
RBC # BLD AUTO: 4.64 MILL/CMM (ref 4–5.4)
SODIUM SERPL-SCNC: 145 MMOL/L (ref 135–145)
TRIGL SERPL-MCNC: 91 MG/DL
WBC # BLD AUTO: 7.3 THOU/CMM (ref 4–10.5)

## 2024-09-08 NOTE — RESULT ENCOUNTER NOTE
Mild changes on labs and we will discuss at our next follow up.  PSA mildly elevated and we will continue to follow.

## 2024-09-25 ENCOUNTER — RA CDI HCC (OUTPATIENT)
Dept: OTHER | Facility: HOSPITAL | Age: 89
End: 2024-09-25

## 2024-10-04 ENCOUNTER — OFFICE VISIT (OUTPATIENT)
Dept: INTERNAL MEDICINE CLINIC | Facility: CLINIC | Age: 89
End: 2024-10-04
Payer: MEDICARE

## 2024-10-04 VITALS
HEART RATE: 85 BPM | OXYGEN SATURATION: 98 % | WEIGHT: 190 LBS | TEMPERATURE: 95.7 F | DIASTOLIC BLOOD PRESSURE: 90 MMHG | SYSTOLIC BLOOD PRESSURE: 132 MMHG | HEIGHT: 70 IN | BODY MASS INDEX: 27.2 KG/M2

## 2024-10-04 DIAGNOSIS — Z23 ENCOUNTER FOR IMMUNIZATION: Primary | ICD-10-CM

## 2024-10-04 DIAGNOSIS — M62.830 LUMBAR PARASPINAL MUSCLE SPASM: ICD-10-CM

## 2024-10-04 DIAGNOSIS — R41.3 SHORT-TERM MEMORY LOSS: ICD-10-CM

## 2024-10-04 PROCEDURE — 99214 OFFICE O/P EST MOD 30 MIN: CPT | Performed by: INTERNAL MEDICINE

## 2024-10-04 PROCEDURE — 90677 PCV20 VACCINE IM: CPT

## 2024-10-04 PROCEDURE — G0009 ADMIN PNEUMOCOCCAL VACCINE: HCPCS

## 2024-10-04 PROCEDURE — G0008 ADMIN INFLUENZA VIRUS VAC: HCPCS

## 2024-10-04 PROCEDURE — 90662 IIV NO PRSV INCREASED AG IM: CPT

## 2024-10-04 RX ORDER — ESCITALOPRAM OXALATE 5 MG/1
TABLET ORAL DAILY
COMMUNITY
Start: 2024-10-03

## 2024-10-04 RX ORDER — DONEPEZIL HYDROCHLORIDE 10 MG/1
10 TABLET, FILM COATED ORAL
Qty: 200 TABLET | Refills: 1 | Status: SHIPPED | OUTPATIENT
Start: 2024-10-04

## 2024-10-04 NOTE — PATIENT INSTRUCTIONS
Patient Education     Back Stretches Standing or Seated   About this topic   Keeping your back muscles flexible is important. Stretching exercises can help to lessen pain and stiffness, increase flexibility, and make your daily activities easier.  General   Before starting with a program, ask your doctor if you are healthy enough to do these exercises. Your doctor may have you work with a , chiropractor or physical therapist to make a safe exercise program to meet your needs.  Stretching Exercises   Stretching exercises keep your muscles flexible. They also stop them from getting tight. Start by doing each of these stretches 2 to 3 times. In order for your body to make changes, you will need to hold these stretches for 20 to 30 seconds. Try to do the stretches 2 to 3 times each day. Do all exercises slowly.  Lower back stretches seated ? Sit in a chair with your feet spread about shoulder width apart. Then, lean forward until you feel a stretch in your lower back.  Back bends standing ? Stand with feet slightly apart. Put your hands on your hips. Lean back and look towards the ceiling until you feel a stretch. For a disc problem, you can do this exercise without holding it for 10 times in a row.  Side bends ? Stand with your hands on your hips, feet shoulder width apart. Keep your left hand on your hip and lean to the right, sliding your right hand down the outside of your right leg. Stand up straight. Keep your right hand on your hip and lean to the left, sliding your left hand down your left leg.  Opposite foot touches standing ? Stand with your feet a little more than shoulder width apart. Reach your arms straight out from your sides. Bend forward at the waist and reach your right hand towards your left foot. Your other arm will reach behind you upwards towards the vince. Keep your arms and legs straight. Now, stand back up and repeat with the left hand reaching towards the right foot.  Upper body twists ?  Put your hands on your hips and twist your upper body to the left. Now, twist to the right.             What will the results be?   Better flexibility and range of motion  Less back pain  Less muscle tightness  Less back spasms  Less leg numbness and tingling  Easier to walk and do other activities  Improved posture  Improved sports performance  Helpful tips   Stay active and work out to keep your muscles strong and flexible.  Keep a healthy weight to avoid putting too much stress on your spine. Eat a healthy diet to keep your muscles healthy.  Be sure you do not hold your breath when exercising. This can raise your blood pressure. If you tend to hold your breath, try counting out loud when exercising. If any exercise bothers you, stop right away.  Always warm up before stretching. Heated muscles stretch much easier than cool muscles. Stretching cool muscles can lead to injury.  Try walking or cycling at an easy pace for a few minutes to warm up your muscles. Do this again after exercising.  Never bounce when doing stretches.  Doing exercises before a meal may be a good way to get into a routine.  Exercise may be slightly uncomfortable, but you should not have sharp pains. If you do get sharp pains, stop what you are doing. If the sharp pains continue, call your doctor.  Last Reviewed Date   2021-03-18  Consumer Information Use and Disclaimer   This generalized information is a limited summary of diagnosis, treatment, and/or medication information. It is not meant to be comprehensive and should be used as a tool to help the user understand and/or assess potential diagnostic and treatment options. It does NOT include all information about conditions, treatments, medications, side effects, or risks that may apply to a specific patient. It is not intended to be medical advice or a substitute for the medical advice, diagnosis, or treatment of a health care provider based on the health care provider's examination and  assessment of a patient’s specific and unique circumstances. Patients must speak with a health care provider for complete information about their health, medical questions, and treatment options, including any risks or benefits regarding use of medications. This information does not endorse any treatments or medications as safe, effective, or approved for treating a specific patient. [employer] and its affiliates disclaim any warranty or liability relating to this information or the use thereof. The use of this information is governed by the Terms of Use, available at https://www.wolTDXuwer.com/en/know/clinical-effectiveness-terms   Copyright   Copyright © 2024 [employer] and its affiliates and/or licensors. All rights reserved.

## 2024-10-04 NOTE — PROGRESS NOTES
"Ambulatory Visit  Name: Norman Walsh      : 1935      MRN: [id number]  Encounter Provider: Benjamín Montaño DO  Encounter Date: 10/4/2024   Encounter department: [facility]    Assessment & Plan  Encounter for immunization  The patient is accepting of vaccinations  Orders:    Pneumococcal Conjugate Vaccine 20-valent (Pcv20)    influenza vaccine, high-dose, PF 0.5 mL (Fluzone High Dose)    Short-term memory loss  Moderate/advanced Alzheimer's type dementia  Tolerating Aricept, but uncertain how effective the medication is with advanced disease.  Wife would like to continue with treatment  Orders:    donepezil (ARICEPT) 10 mg tablet; Take 1 tablet (10 mg total) by mouth daily at bedtime    Lumbar paraspinal muscle spasm  Heat  Xray of the lumbar spine  Tylenol PRN for pain  Orders:    XR spine lumbar minimum 4 views non injury; Future       History of Present Illness     HPI      Review of Systems   Constitutional:  Negative for chills, fatigue and fever.   HENT: Negative.     Respiratory:  Negative for cough, chest tightness and shortness of breath.    Cardiovascular:  Negative for chest pain and palpitations.   Gastrointestinal:  Negative for abdominal pain, constipation, diarrhea, nausea and vomiting.   Genitourinary: Negative.    Musculoskeletal:  Positive for arthralgias, back pain and myalgias.   Skin: Negative.    Neurological: Negative.    Psychiatric/Behavioral:  Negative for dysphoric mood. The patient is not nervous/anxious.            Objective     /90   Pulse 85   Temp (!) 95.7 °F (35.4 °C)   Ht 5' 10\" (1.778 m)   Wt 86.2 kg (190 lb)   SpO2 98%   BMI 27.26 kg/m²     Physical Exam  Vitals and nursing note reviewed.   Constitutional:       General: He is not in acute distress.     Appearance: He is well-developed.   HENT:      Head: Normocephalic and atraumatic.   Eyes:      Conjunctiva/sclera: Conjunctivae normal.   Cardiovascular:      Rate and Rhythm: Normal rate and " regular rhythm.      Heart sounds: No murmur heard.  Pulmonary:      Effort: Pulmonary effort is normal. No respiratory distress.      Breath sounds: Normal breath sounds.   Abdominal:      Palpations: Abdomen is soft.      Tenderness: There is no abdominal tenderness.   Musculoskeletal:         General: Tenderness present. No swelling.      Cervical back: Neck supple.   Skin:     General: Skin is warm and dry.      Capillary Refill: Capillary refill takes less than 2 seconds.   Neurological:      Mental Status: He is alert.   Psychiatric:         Mood and Affect: Mood normal.

## 2024-10-29 ENCOUNTER — TELEPHONE (OUTPATIENT)
Age: 89
End: 2024-10-29

## 2024-10-29 NOTE — TELEPHONE ENCOUNTER
Reviewed the chart and I am not sure what to make of that work up.  I would have to see him myself to make any reasonable changes

## 2024-10-29 NOTE — TELEPHONE ENCOUNTER
Patient's wife would like Dr. Montaño to review patient's recent ED visit with [facility]. She would like to know if they need to make an appointment or if they can have a call to discuss anything Dr. Montaño sees. They request a call to advise.

## 2025-01-06 ENCOUNTER — TELEPHONE (OUTPATIENT)
Age: 89
End: 2025-01-06

## 2025-01-06 DIAGNOSIS — F02.80 LATE ONSET ALZHEIMER'S DEMENTIA WITHOUT BEHAVIORAL DISTURBANCE (HCC): Primary | ICD-10-CM

## 2025-01-06 DIAGNOSIS — R53.81 DEBILITY: ICD-10-CM

## 2025-01-06 DIAGNOSIS — G30.1 LATE ONSET ALZHEIMER'S DEMENTIA WITHOUT BEHAVIORAL DISTURBANCE (HCC): Primary | ICD-10-CM

## 2025-01-06 NOTE — TELEPHONE ENCOUNTER
Wife called stating at last appointment they were referred to [facility] and they did go to check it out but wife did not feel that was the best route for patient.  Wife asking if Physical therapy might be a better choice for patient to help strengthen his arms and legs on a more one on one basis?    Please advise and refer if possible.

## 2025-01-08 NOTE — PROGRESS NOTES
"PT Evaluation     Today's date: 2025  Patient name: Norman Walsh  : 1935  MRN: [id number]  Referring provider: Benjamín Montaño DO  Dx:   Encounter Diagnosis     ICD-10-CM    1. Debility  R53.81       2. Impaired functional mobility, balance, gait, and endurance  Z74.09       3. 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)  G30.1     F02.80                      Assessment  Impairments: abnormal gait, abnormal or restricted ROM, activity intolerance, impaired balance, impaired physical strength, lacks appropriate home exercise program, weight-bearing intolerance, poor posture , activity limitations and endurance  Other impairment: decreased flexibility    Assessment details: The patient is an 89 y/o male who presents to PT with diagnosis of debility and late onset Alzheimer's.  He has no complaints of pain.  His BLE ROM is WFL at this time.  He demonstrates deficits with decreased BLE strength, decreased flexibility and decreased balance and proprioception.  These deficits lead to antalgic gait, difficulty with stair negotiation, limited standing tolerance and decreased tolerance to functional activities.  He typically ambulates without AD but does have SPC in the car to use when needed.  He ambulates with slow christopher, shuffling gait and slightly forward flexed posture.  He can go up and down the steps with reciprocal gait pattern though with use of HR and slowly.  His 5x STS time is 29.35\" with BUE use.  His TUG time is 20.59\".  6 minute walk test distance is 857' 4\" without AD.  Based on subjective reporting and special tests he is at risk of falling though he denies any recent falls at home.  He is limited with his overall mobility and function.  The patient would benefit from continued PT to address deficits and improve function.  Tx to include ROM, stretching, strengthening, modalities, HEP, pt education, postural ed, lifting/body mechanics, neuro re-ed, " "balance/proprioception Te, MT and equipment.          Barriers to therapy: Moderate dementia, difficulty with following one step commands at times  Understanding of Dx/Px/POC: good     Prognosis: fair    Goals  STGs:  1.  Initiate and complete HEP with verbal cues.  2.  Improve BLE strength by 1/2 grade in 4 weeks.  3.  Improve 5x STS time by > 5\"  in 4 weeks.  LTGs:  1.  Patient to be I with HEP in 12 weeks.  2.  Improve BLE strength to > 4 to 4+/5 t/o in 12 weeks to improve function.  3.  Improve 5x STS time to < 16\" in 12 weeks to improve function.  4.  Improve TUG time to < 15\" in 12 weeks to improve function and decrease fall risk.  5.  Ambulation is improved to maximal level of function in 12 weeks.  6.  Stair negotiation is improved to maximal level of function in 12 weeks.  7.  Recreational performance in related activities is improved to maximal level of function in 12 weeks.  8.  ADL performance is improved to maximal level of function in 12 weeks.  9.  Improve 6 min walk test distance to > 1300' in 12 weeks.            Plan  Patient would benefit from: skilled physical therapy    Planned therapy interventions: balance, balance/weight bearing training, manual therapy, neuromuscular re-education, patient/caregiver education, postural training, self care, strengthening, stretching, therapeutic activities, therapeutic exercise, functional ROM exercises, flexibility, gait training, transfer training, home exercise program, graded exercise and graded activity    Frequency: 2x week  Duration in weeks: 12  Plan of Care beginning date: 1/13/2025  Plan of Care expiration date: 4/7/2025  Treatment plan discussed with: patient and family  Plan details: Modalities and therapy interventions prn.        Subjective Evaluation    History of Present Illness  Mechanism of injury: The patient's wife present for his IE.  She reports that he has moderate dementia, some days are better than others.  She reports that over the " "past two months he has been shuffling his feet more when walking, unable to walk outside with her like he used too.      His wife had called his PCP about getting a script for therapy.  He was referred to [facility] and he now presents for his evaluation.  He will be going back to see the doctor on 3/31/25 for his follow up appointment.      She notes weakness in his legs in the morning when getting in and out of bed.  I with showering, wife lays out clothes for dressing but he can dress himself.   He denies any pain today.     Patient Goals  Patient goals for therapy: increased motion, improved balance and increased strength  Patient goal: \"To get my legs stronger, to walk better, to have better balance.\"  Pain  No pain reported    Social Support  Steps to enter house: yes  2  Stairs in house: yes   7  Lives in: multiple-level home  Lives with: spouse    Employment status: not working      Objective     Active Range of Motion   Left Hip   Flexion: WFL    Right Hip   Flexion: WFL  Left Knee   Flexion: WFL  Extension: WFL    Right Knee   Flexion: WFL  Extension: WFL    Strength/Myotome Testing     Left Hip   Planes of Motion   Flexion: 4  Abduction: 4-  Adduction: 4+  External rotation: 4  Internal rotation: 4    Right Hip   Planes of Motion   Flexion: 4  Abduction: 4-  Adduction: 4+  External rotation: 4  Internal rotation: 4    Left Knee   Flexion: 4  Extension: 4    Right Knee   Flexion: 4  Extension: 4    Left Ankle/Foot   Dorsiflexion: 4  Plantar flexion: 4+    Right Ankle/Foot   Dorsiflexion: 4  Plantar flexion: 4+    Ambulation   Weight-Bearing Status   Assistive device used: none    Additional Weight-Bearing Status Details  Typically ambulates without AD, but does keep cane in the car to use when needed.      Ambulation: Stairs   Ascend stairs: independent  Pattern: reciprocal  Railings: one rail  Descend stairs: independent  Pattern: reciprocal  Railings: one rail    Additional Stairs Ambulation " "Details  Reciprocal gait pattern with use of HR and slowly.      Observational Gait   Decreased walking speed and stride length.   Left foot contact pattern: foot flat  Right foot contact pattern: foot flat  Left arm swing: decreased  Right arm swing: decreased    Additional Observational Gait Details  Shuffling gait pattern - can improve gait mechanics with cueing.      Functional Assessment        Comments  FTEO: 30\"  FTEC: 8\" with sway    L SLS: 2-3\"  R SLS: 2-3\"     Neuro Exam:     Functional outcomes   6 minute walk test: 857' 04\"  5x sit to stand: 29.35\" with BUE use (seconds)  TU.59\" (seconds)                Precautions: Fall Risk, moderate dementia, HTN, memory loss,        Manuals                                        Neuro Re-Ed         SLS        Tandem Stance        Sidestepping        Hurdles        Backward Amb        HKM        Foam FTEO        Foam FTEC        Foam FT w/HT        Foam FT w/HN        Foam w/Alt UE Flex                Ther Ex        NuStep for LE Strength        SLR x 3        LAQ                Leg Press        Knee Flex Machine        Knee Ext Machine                                        Ther Activity        STS        Stepups F/L        Stepdowns                Gait Training                        Modalities                                    "

## 2025-01-13 ENCOUNTER — EVALUATION (OUTPATIENT)
Dept: PHYSICAL THERAPY | Facility: CLINIC | Age: 89
End: 2025-01-13
Payer: MEDICARE

## 2025-01-13 DIAGNOSIS — Z74.09 IMPAIRED FUNCTIONAL MOBILITY, BALANCE, GAIT, AND ENDURANCE: ICD-10-CM

## 2025-01-13 DIAGNOSIS — R53.81 DEBILITY: Primary | ICD-10-CM

## 2025-01-13 DIAGNOSIS — F02.80 LATE ONSET ALZHEIMER'S DEMENTIA WITHOUT BEHAVIORAL DISTURBANCE (HCC): ICD-10-CM

## 2025-01-13 DIAGNOSIS — G30.1 LATE ONSET ALZHEIMER'S DEMENTIA WITHOUT BEHAVIORAL DISTURBANCE (HCC): ICD-10-CM

## 2025-01-13 PROCEDURE — 97162 PT EVAL MOD COMPLEX 30 MIN: CPT | Performed by: PHYSICAL THERAPIST

## 2025-01-15 NOTE — PROGRESS NOTES
"Daily Note     Today's date: 2025  Patient name: Norman Walsh  : 1935  MRN: [id number]  Referring provider: Benjamín Montaño DO  Dx:   Encounter Diagnosis     ICD-10-CM    1. Debility  R53.81       2. Impaired functional mobility, balance, gait, and endurance  Z74.09       3. 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)  G30.1     F02.80                      Subjective: The patient states that he is doing okay today.        Objective: See treatment diary below      Assessment: Initiated TE as outlined below in daily treatment diary.  Tolerated treatment well.  Secondary to memory/cognition he needs multiple verbal and manual cues when completing TE and to stay focused.  No complaints at end of session other than reporting fatigue.  Patient demonstrated fatigue post treatment and would benefit from continued PT      Plan: Continue per plan of care.   Progress as able in upcoming visits.          Precautions: Fall Risk, moderate dementia, HTN, memory loss,        Manuals                                       Neuro Re-Ed         SLS        Tandem Stance        Sidestepping  // bars   4 laps  1 UE      Hurdles        Backward Amb  // bars  4 laps   1 UE      HKM  // bar  4 laps   1 UE      Foam FTEO  Firm FTEO  20\"x2       Foam FTEC        Foam FT w/HT        Foam FT w/HN        Foam w/Alt UE Flex  Firm with Alt UE flexion 10x     Firm with cone reach - 2 cones  5x to various heights - place from R hand to/from L hand              Ther Ex        NuStep for LE Strength  L2 10 mins      SLR x 3  Hip Abd & Ext  2x10 ea Mario       LAQ  Mario 2x10 ea               Leg Press        Knee Flex Machine        Knee Ext Machine                                        Ther Activity        STS        Stepups F/L        Stepdowns                Gait Training                        Modalities                                    "

## 2025-01-16 ENCOUNTER — OFFICE VISIT (OUTPATIENT)
Dept: PHYSICAL THERAPY | Facility: CLINIC | Age: 89
End: 2025-01-16
Payer: MEDICARE

## 2025-01-16 DIAGNOSIS — G30.1 LATE ONSET ALZHEIMER'S DEMENTIA WITHOUT BEHAVIORAL DISTURBANCE (HCC): ICD-10-CM

## 2025-01-16 DIAGNOSIS — Z74.09 IMPAIRED FUNCTIONAL MOBILITY, BALANCE, GAIT, AND ENDURANCE: ICD-10-CM

## 2025-01-16 DIAGNOSIS — R53.81 DEBILITY: Primary | ICD-10-CM

## 2025-01-16 DIAGNOSIS — F02.80 LATE ONSET ALZHEIMER'S DEMENTIA WITHOUT BEHAVIORAL DISTURBANCE (HCC): ICD-10-CM

## 2025-01-16 PROCEDURE — 97112 NEUROMUSCULAR REEDUCATION: CPT | Performed by: PHYSICAL THERAPIST

## 2025-01-16 PROCEDURE — 97110 THERAPEUTIC EXERCISES: CPT | Performed by: PHYSICAL THERAPIST

## 2025-01-20 NOTE — PROGRESS NOTES
"Daily Note     Today's date: 2025  Patient name: Norman Walsh  : 1935  MRN: [id number]  Referring provider: Benjamín Montaño DO  Dx:   Encounter Diagnosis     ICD-10-CM    1. Debility  R53.81       2. Impaired functional mobility, balance, gait, and endurance  Z74.09       3. 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)  G30.1     F02.80                      Subjective: The patient states that he is doing okay today.        Objective: See treatment diary below      Assessment: Tolerated treatment well.  Progressed patient with balance activities as outlined below.  He was more challenged on foam today vs firm surface but able to maintain his balance with increased sway noted.  Patient demonstrated fatigue post treatment and would benefit from continued PT      Plan: Continue per plan of care.      Precautions: Fall Risk, moderate dementia, HTN, memory loss,        Manuals                                      Neuro Re-Ed         SLS        Tandem Stance        Sidestepping  // bars   4 laps  1 UE // bars  4 laps  1 UE     Hurdles        Backward Amb  // bars  4 laps   1 UE // bars  4 laps   1 UE     HKM  // bars  4 laps   1 UE // bars   4 laps  1 UE     Foam FTEO  Firm FTEO  20\"x2  Foam FTEO  20\"x2  Close S     Foam FTEC        Foam FT w/HT   20\"x1   No UE  Close S     Foam FT w/HN   20\"x1  No UE  Close S     Foam w/Alt UE Flex  Firm with Alt UE flexion 10x     Firm with cone reach - 2 cones  5x to various heights - place from R hand to/from L hand Foam, holding beach ball, lifting OH 2x10   Close S     Firm with cone reach - 2 cones  5x to various heights - place from R hand to/from L hand             Ther Ex        NuStep for LE Strength  L2 10 mins L2 10 mins     SLR x 3  Hip Abd & Ext  2x10 ea Mario       LAQ  Mario 2x10 ea  Mario 2x10         Heel Raise 2x10     Leg Press        Knee Flex Machine        Knee Ext Machine                                        Ther " Caregiver called for     Scanned to chart Activity        STS   10x with intermittent use of BUE on arm rests     Stepups F/L        Stepdowns                Gait Training                        Modalities

## 2025-01-21 ENCOUNTER — OFFICE VISIT (OUTPATIENT)
Dept: PHYSICAL THERAPY | Facility: CLINIC | Age: 89
End: 2025-01-21
Payer: MEDICARE

## 2025-01-21 DIAGNOSIS — F02.80 LATE ONSET ALZHEIMER'S DEMENTIA WITHOUT BEHAVIORAL DISTURBANCE (HCC): ICD-10-CM

## 2025-01-21 DIAGNOSIS — Z74.09 IMPAIRED FUNCTIONAL MOBILITY, BALANCE, GAIT, AND ENDURANCE: ICD-10-CM

## 2025-01-21 DIAGNOSIS — G30.1 LATE ONSET ALZHEIMER'S DEMENTIA WITHOUT BEHAVIORAL DISTURBANCE (HCC): ICD-10-CM

## 2025-01-21 DIAGNOSIS — R53.81 DEBILITY: Primary | ICD-10-CM

## 2025-01-21 PROCEDURE — 97110 THERAPEUTIC EXERCISES: CPT | Performed by: PHYSICAL THERAPIST

## 2025-01-21 PROCEDURE — 97112 NEUROMUSCULAR REEDUCATION: CPT | Performed by: PHYSICAL THERAPIST

## 2025-01-22 NOTE — PROGRESS NOTES
"Daily Note     Today's date: 2025  Patient name: Norman Walsh  : 1935  MRN: [id number]  Referring provider: Benjamín Montaño DO  Dx:   Encounter Diagnosis     ICD-10-CM    1. Debility  R53.81       2. Impaired functional mobility, balance, gait, and endurance  Z74.09       3. 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)  G30.1     F02.80                      Subjective: The patient states that he is feeling good today.        Objective: See treatment diary below      Assessment: Tolerated treatment fair.  Patient was more distractible during session today, more cues needed to complete activities.  He also complained of his stomach bothering him during the session, informed his wife at the end of treatment.  Patient would benefit from continued PT      Plan: Continue per plan of care.      Precautions: Fall Risk, moderate dementia, HTN, memory loss,        Manuals                                     Neuro Re-Ed         SLS        Tandem Stance        Sidestepping  // bars   4 laps  1 UE // bars  4 laps  1 UE // bars  4 laps  1 UE    Hurdles        Backward Amb  // bars  4 laps   1 UE // bars  4 laps   1 UE     HKM  // bars  4 laps   1 UE // bars   4 laps  1 UE // bars  4 laps  1 UE    Foam FTEO  Firm FTEO  20\"x2  Foam FTEO  20\"x2  Close S Foam FTEO  20\"x2  Close S    Foam FTEC        Foam FT w/HT   20\"x1   No UE  Close S Resume NV    Foam FT w/HN   20\"x1  No UE  Close S Resume NV    Foam w/Alt UE Flex  Firm with Alt UE flexion 10x     Firm with cone reach - 2 cones  5x to various heights - place from R hand to/from L hand Foam, holding beach ball, lifting OH 2x10   Close S     Firm with cone reach - 2 cones  5x to various heights - place from R hand to/from L hand Foam, holding beach ball, lifting OH 2x10   Close S     Firm with cone reach - 2 cones  5x to various heights - place from R hand to/from L hand            Ther Ex        NuStep for LE Strength  L2 10 " mins L2 10 mins L3 10 mins    SLR x 3  Hip Abd & Ext  2x10 ea Mario       LAQ  Mario 2x10 ea  Mario 2x10         Heel Raise 2x10 Heel Raise 2x10    Leg Press        Knee Flex Machine        Knee Ext Machine                                        Ther Activity        STS   10x with intermittent use of BUE on arm rests 10x with intermittent use of BUE on arm rests     Stepups F/L        Stepdowns                Gait Training                        Modalities

## 2025-01-23 ENCOUNTER — OFFICE VISIT (OUTPATIENT)
Dept: PHYSICAL THERAPY | Facility: CLINIC | Age: 89
End: 2025-01-23
Payer: MEDICARE

## 2025-01-23 DIAGNOSIS — G30.1 LATE ONSET ALZHEIMER'S DEMENTIA WITHOUT BEHAVIORAL DISTURBANCE (HCC): ICD-10-CM

## 2025-01-23 DIAGNOSIS — Z74.09 IMPAIRED FUNCTIONAL MOBILITY, BALANCE, GAIT, AND ENDURANCE: ICD-10-CM

## 2025-01-23 DIAGNOSIS — R53.81 DEBILITY: Primary | ICD-10-CM

## 2025-01-23 DIAGNOSIS — F02.80 LATE ONSET ALZHEIMER'S DEMENTIA WITHOUT BEHAVIORAL DISTURBANCE (HCC): ICD-10-CM

## 2025-01-23 PROCEDURE — 97110 THERAPEUTIC EXERCISES: CPT | Performed by: PHYSICAL THERAPIST

## 2025-01-23 PROCEDURE — 97112 NEUROMUSCULAR REEDUCATION: CPT | Performed by: PHYSICAL THERAPIST

## 2025-01-24 NOTE — PROGRESS NOTES
"Daily Note     Today's date: 2025  Patient name: Norman Walsh  : 1935  MRN: [id number]  Referring provider: Benjamín Montaño DO  Dx:   Encounter Diagnosis     ICD-10-CM    1. Debility  R53.81       2. Impaired functional mobility, balance, gait, and endurance  Z74.09       3. 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)  G30.1     F02.80                      Subjective: The patient's wife states that she has to tell him to pick his feet up at times when walking.      Objective: See treatment diary below      Assessment: Tolerated treatment well.  Issued HEP to patient and his wife, she verbalized understanding.  The patient did better today vs last session, was able to stay more focused and was not as distractible.  Patient would benefit from continued PT to improve function.        Plan: Continue per plan of care.      Precautions: Fall Risk, moderate dementia, HTN, memory loss,        Manuals                                    Neuro Re-Ed         SLS        Tandem Stance        Sidestepping  // bars   4 laps  1 UE // bars  4 laps  1 UE // bars  4 laps  1 UE Bar at mirror   4 laps  1 UE   Hurdles        Backward Amb  // bars  4 laps   1 UE // bars  4 laps   1 UE     HKM  // bars  4 laps   1 UE // bars   4 laps  1 UE // bars  4 laps  1 UE Bar at mirror  4 laps   1 UE   Foam FTEO  Firm FTEO  20\"x2  Foam FTEO  20\"x2  Close S Foam FTEO  20\"x2  Close S Foam FTEO  20\"x2  Close S   Foam FTEC        Foam FT w/HT   20\"x1   No UE  Close S Resume NV    Foam FT w/HN   20\"x1  No UE  Close S Resume NV    Foam w/Alt UE Flex  Firm with Alt UE flexion 10x     Firm with cone reach - 2 cones  5x to various heights - place from R hand to/from L hand Foam, holding beach ball, lifting OH 2x10   Close S     Firm with cone reach - 2 cones  5x to various heights - place from R hand to/from L hand Foam, holding beach ball, lifting OH 2x10   Close S     Firm with cone reach - 2 " "cones  5x to various heights - place from R hand to/from L hand Foam, holding beach ball, lifting OH 2x10   Close S     Firm with cone reach - 2 cones  10x to various heights - place from R hand to/from L hand           Ther Ex        NuStep for LE Strength  L2 10 mins L2 10 mins L3 10 mins L4 10 mins   SLR x 3  Hip Abd & Ext  2x10 ea Mario    Hip Abd & Ext 2x10 ea Mario   LAQ  Mario 2x10 ea  Mario 2x10         Heel Raise 2x10 Heel Raise 2x10 Heel Raise  2x10   Leg Press        Knee Flex Machine        Knee Ext Machine                                        Ther Activity        STS   10x with intermittent use of BUE on arm rests 10x with intermittent use of BUE on arm rests  10x with intermittent use of BUE on arm rests   Stepups F/L        Stepdowns                Gait Training                        Modalities                             Access Code: [email or web address]  URL: [email or web address]  Date: 01/27/2025  Prepared by: Liliam    Exercises  - Standing Hip Abduction with Counter Support  - 1 x daily - 7 x weekly - 2 sets - 10 reps  - Standing Hip Extension with Counter Support  - 1 x daily - 7 x weekly - 2 sets - 10 reps  - Standing Heel Raise with Support  - 1 x daily - 7 x weekly - 2 sets - 10 reps  - Standing March with Counter Support  - 1 x daily - 7 x weekly - 2 sets - 10 reps  - Seated Long Arc Quad  - 1 x daily - 7 x weekly - 2 sets - 10 reps - 3-5\" hold           "

## 2025-01-27 ENCOUNTER — OFFICE VISIT (OUTPATIENT)
Dept: PHYSICAL THERAPY | Facility: CLINIC | Age: 89
End: 2025-01-27
Payer: MEDICARE

## 2025-01-27 ENCOUNTER — RA CDI HCC (OUTPATIENT)
Dept: OTHER | Facility: HOSPITAL | Age: 89
End: 2025-01-27

## 2025-01-27 DIAGNOSIS — F02.80 LATE ONSET ALZHEIMER'S DEMENTIA WITHOUT BEHAVIORAL DISTURBANCE (HCC): ICD-10-CM

## 2025-01-27 DIAGNOSIS — G30.1 LATE ONSET ALZHEIMER'S DEMENTIA WITHOUT BEHAVIORAL DISTURBANCE (HCC): ICD-10-CM

## 2025-01-27 DIAGNOSIS — Z74.09 IMPAIRED FUNCTIONAL MOBILITY, BALANCE, GAIT, AND ENDURANCE: ICD-10-CM

## 2025-01-27 DIAGNOSIS — R53.81 DEBILITY: Primary | ICD-10-CM

## 2025-01-27 PROCEDURE — 97112 NEUROMUSCULAR REEDUCATION: CPT | Performed by: PHYSICAL THERAPIST

## 2025-01-27 PROCEDURE — 97110 THERAPEUTIC EXERCISES: CPT | Performed by: PHYSICAL THERAPIST

## 2025-01-29 ENCOUNTER — OFFICE VISIT (OUTPATIENT)
Dept: PHYSICAL THERAPY | Facility: CLINIC | Age: 89
End: 2025-01-29
Payer: MEDICARE

## 2025-01-29 DIAGNOSIS — R53.81 DEBILITY: Primary | ICD-10-CM

## 2025-01-29 DIAGNOSIS — G30.1 LATE ONSET ALZHEIMER'S DEMENTIA WITHOUT BEHAVIORAL DISTURBANCE (HCC): ICD-10-CM

## 2025-01-29 DIAGNOSIS — F02.80 LATE ONSET ALZHEIMER'S DEMENTIA WITHOUT BEHAVIORAL DISTURBANCE (HCC): ICD-10-CM

## 2025-01-29 DIAGNOSIS — Z74.09 IMPAIRED FUNCTIONAL MOBILITY, BALANCE, GAIT, AND ENDURANCE: ICD-10-CM

## 2025-01-29 PROCEDURE — 97112 NEUROMUSCULAR REEDUCATION: CPT

## 2025-01-29 PROCEDURE — 97110 THERAPEUTIC EXERCISES: CPT

## 2025-01-29 NOTE — PROGRESS NOTES
"Daily Note     Today's date: 2025  Patient name: Norman Walsh  : 1935  MRN: [id number]  Referring provider: Benjamín Montaño DO  Dx:   Encounter Diagnosis     ICD-10-CM    1. Debility  R53.81       2. Impaired functional mobility, balance, gait, and endurance  Z74.09       3. 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)  G30.1     F02.80           Start Time: 1030  Stop Time: 1115  Total time in clinic (min): 45 minutes    Subjective: Pt offering no new complaints. Pt wife states that he is doing well.       Objective: See treatment diary below      Assessment: Tolerated treatment well. Able to complete POC without incident. Requiring frequent verbal and visual cues for proper technique throughout session. Requiring cues to remain on tasks and easily distractible throughout session. Added hurdles today to progress dynamic balance and pt able to perform this with 1 HHA with appropriate clearance. Significantly challenged maintaining balance on compliant surfaces. Will cont to progress as tolerated. Patient demonstrated fatigue post treatment and would benefit from continued PT      Plan: Continue per plan of care.  Progress treatment as tolerated.       Precautions: Fall Risk, moderate dementia, HTN, memory loss,        Manuals                                    Neuro Re-Ed         SLS        Tandem Stance        Sidestepping Bar at mirror   4 laps  1 UE // bars   4 laps  1 UE // bars  4 laps  1 UE // bars  4 laps  1 UE Bar at mirror   4 laps  1 UE   Hurdles        Backward Amb  // bars  4 laps   1 UE // bars  4 laps   1 UE     HKM Bar at mirror  4 laps   1 UE // bars  4 laps   1 UE // bars   4 laps  1 UE // bars  4 laps  1 UE Bar at mirror  4 laps   1 UE   Foam FTEO Foam FTEO  20\"x2  Close S Firm FTEO  20\"x2  Foam FTEO  20\"x2  Close S Foam FTEO  20\"x2  Close S Foam FTEO  20\"x2  Close S   Foam FTEC        Foam FT w/HT Foam FT w/HT and HN  20\"x1   No " "UE  Close S Resume NV    Foam FT w/HN   20\"x1  No UE  Close S Resume NV    Foam w/Alt UE Flex Foam, holding beach ball, lifting OH x5   Close S in // bars, frequent cues Firm with Alt UE flexion 10x     Firm with cone reach - 2 cones  5x to various heights - place from R hand to/from L hand Foam, holding beach ball, lifting OH 2x10   Close S     Firm with cone reach - 2 cones  5x to various heights - place from R hand to/from L hand Foam, holding beach ball, lifting OH 2x10   Close S     Firm with cone reach - 2 cones  5x to various heights - place from R hand to/from L hand Foam, holding beach ball, lifting OH 2x10   Close S     Firm with cone reach - 2 cones  10x to various heights - place from R hand to/from L hand    Hurdles fwd/lat frequent verbal and visual cues x4 laps ea        Ther Ex        NuStep for LE Strength L4 10 mins  L2 10 mins L2 10 mins L3 10 mins L4 10 mins   SLR x 3 Hip Abd & Ext 2x10 ea Mario Hip Abd & Ext  2x10 ea Mario    Hip Abd & Ext 2x10 ea Mario   LAQ  Mario 2x10 ea  Mario 2x10       Heel raise 2x10   Heel Raise 2x10 Heel Raise 2x10 Heel Raise  2x10   Leg Press        Knee Flex Machine        Knee Ext Machine                                        Ther Activity        STS 10x with intermittent use of BUE on arm rests  10x with intermittent use of BUE on arm rests 10x with intermittent use of BUE on arm rests  10x with intermittent use of BUE on arm rests   Stepups F/L        Stepdowns                Gait Training                        Modalities                             Access Code: [email or web address]  URL: [email or web address]  Date: 01/27/2025  Prepared by: Liliam    Exercises  - Standing Hip Abduction with Counter Support  - 1 x daily - 7 x weekly - 2 sets - 10 reps  - Standing Hip Extension with Counter Support  - 1 x daily - 7 x weekly - 2 sets - 10 reps  - Standing Heel Raise with Support  - 1 x daily - 7 x weekly - 2 sets - 10 reps  - Standing March with Counter Support  - 1 x " "daily - 7 x weekly - 2 sets - 10 reps  - Seated Long Arc Quad  - 1 x daily - 7 x weekly - 2 sets - 10 reps - 3-5\" hold             "

## 2025-01-30 ENCOUNTER — APPOINTMENT (OUTPATIENT)
Dept: PHYSICAL THERAPY | Facility: CLINIC | Age: 89
End: 2025-01-30
Payer: MEDICARE

## 2025-02-03 ENCOUNTER — OFFICE VISIT (OUTPATIENT)
Dept: INTERNAL MEDICINE CLINIC | Facility: CLINIC | Age: 89
End: 2025-02-03
Payer: MEDICARE

## 2025-02-03 VITALS
WEIGHT: 189.4 LBS | TEMPERATURE: 97.7 F | DIASTOLIC BLOOD PRESSURE: 90 MMHG | OXYGEN SATURATION: 99 % | SYSTOLIC BLOOD PRESSURE: 148 MMHG | HEART RATE: 59 BPM | HEIGHT: 70 IN | BODY MASS INDEX: 27.11 KG/M2

## 2025-02-03 DIAGNOSIS — K57.92 DIVERTICULITIS: Primary | ICD-10-CM

## 2025-02-03 DIAGNOSIS — C61 PROSTATE CANCER (HCC): ICD-10-CM

## 2025-02-03 DIAGNOSIS — R10.30 LOWER ABDOMINAL PAIN: ICD-10-CM

## 2025-02-03 DIAGNOSIS — I48.0 PAROXYSMAL ATRIAL FIBRILLATION (HCC): ICD-10-CM

## 2025-02-03 PROCEDURE — G2211 COMPLEX E/M VISIT ADD ON: HCPCS | Performed by: INTERNAL MEDICINE

## 2025-02-03 PROCEDURE — 99214 OFFICE O/P EST MOD 30 MIN: CPT | Performed by: INTERNAL MEDICINE

## 2025-02-03 RX ORDER — TRAZODONE HYDROCHLORIDE 50 MG/1
50 TABLET, FILM COATED ORAL
COMMUNITY
Start: 2025-01-30 | End: 2025-04-30

## 2025-02-03 NOTE — PROGRESS NOTES
"Name: Norman Walsh      : 1935      MRN: [id number]  Encounter Provider: Benjamín Montaño DO  Encounter Date: 2/3/2025   Encounter department: [facility]  :  Assessment & Plan  Diverticulitis  See below  Orders:    CBC and differential; Future    CT abdomen pelvis wo contrast; Future    Lower abdominal pain  Lower abdominal   Worsened with PO  No fevers or chills  No change in symptoms with flatus or BM  The patient notes no nausea/vomiting  Orders:    UA w Reflex to Microscopic w Reflex to Culture; Future    Paroxysmal atrial fibrillation (HCC)  Controlled       Prostate cancer (HCC)  Continue to follow up with Urology              History of Present Illness   The patient has had periods of stomach pain.  The patient is asymptomatic at this time.  The wife notes this is lower abdominal pain worsened with food.      Review of Systems   Constitutional:  Negative for chills, fatigue and fever.   HENT: Negative.     Respiratory:  Negative for cough, chest tightness and shortness of breath.    Cardiovascular:  Negative for chest pain and palpitations.   Gastrointestinal:  Negative for abdominal pain, constipation, diarrhea, nausea and vomiting.   Genitourinary: Negative.    Musculoskeletal:  Negative for back pain and myalgias.   Skin: Negative.    Neurological: Negative.    Psychiatric/Behavioral:  Negative for dysphoric mood. The patient is not nervous/anxious.        Objective   /90   Pulse 59   Temp 97.7 °F (36.5 °C) (Tympanic)   Ht 5' 10\" (1.778 m)   Wt 85.9 kg (189 lb 6.4 oz)   SpO2 99%   BMI 27.18 kg/m²      Physical Exam  Vitals and nursing note reviewed.   Constitutional:       General: He is not in acute distress.     Appearance: He is well-developed.   HENT:      Head: Normocephalic and atraumatic.   Eyes:      Conjunctiva/sclera: Conjunctivae normal.   Cardiovascular:      Rate and Rhythm: Normal rate and regular rhythm.      Heart sounds: No murmur heard.  Pulmonary: "      Effort: Pulmonary effort is normal. No respiratory distress.      Breath sounds: Normal breath sounds.   Abdominal:      Palpations: Abdomen is soft.      Tenderness: There is no abdominal tenderness.   Musculoskeletal:         General: No swelling.      Cervical back: Neck supple.   Skin:     General: Skin is warm and dry.      Capillary Refill: Capillary refill takes less than 2 seconds.   Neurological:      Mental Status: He is alert.   Psychiatric:         Mood and Affect: Mood normal.

## 2025-02-04 ENCOUNTER — OFFICE VISIT (OUTPATIENT)
Dept: PHYSICAL THERAPY | Facility: CLINIC | Age: 89
End: 2025-02-04
Payer: MEDICARE

## 2025-02-04 DIAGNOSIS — G30.1 LATE ONSET ALZHEIMER'S DEMENTIA WITHOUT BEHAVIORAL DISTURBANCE (HCC): ICD-10-CM

## 2025-02-04 DIAGNOSIS — Z74.09 IMPAIRED FUNCTIONAL MOBILITY, BALANCE, GAIT, AND ENDURANCE: ICD-10-CM

## 2025-02-04 DIAGNOSIS — R53.81 DEBILITY: Primary | ICD-10-CM

## 2025-02-04 DIAGNOSIS — F02.80 LATE ONSET ALZHEIMER'S DEMENTIA WITHOUT BEHAVIORAL DISTURBANCE (HCC): ICD-10-CM

## 2025-02-04 PROCEDURE — 97112 NEUROMUSCULAR REEDUCATION: CPT | Performed by: PHYSICAL THERAPIST

## 2025-02-04 PROCEDURE — 97110 THERAPEUTIC EXERCISES: CPT | Performed by: PHYSICAL THERAPIST

## 2025-02-04 NOTE — PROGRESS NOTES
"Daily Note     Today's date: 2025  Patient name: Norman Walsh  : 1935  MRN: [id number]  Referring provider: Benjamín Montaño DO  Dx:   Encounter Diagnosis     ICD-10-CM    1. Debility  R53.81       2. Impaired functional mobility, balance, gait, and endurance  Z74.09       3. 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)  G30.1     F02.80                      Subjective: No new complaints.       Objective: See treatment diary below      Assessment: Pt demo difficulty following direction and required frequent redirection to remain on task. Seated breaks required throughout the session due to c/o LBP. Patient would benefit from continued PT      Plan: Progress treatment as tolerated.       Precautions: Fall Risk, moderate dementia, HTN, memory loss,        Manuals                                    Neuro Re-Ed         SLS        Tandem Stance        Sidestepping Bar at mirror   4 laps  1 UE Bar at mirror   4 laps  No UE  // bars  4 laps  1 UE Bar at mirror   4 laps  1 UE   Hurdles        Backward Amb        HKM Bar at mirror  4 laps   1 UE 4 laps 1 UE  // bars  4 laps  1 UE Bar at mirror  4 laps   1 UE   Foam FTEO Foam FTEO  20\"x2  Close S   Foam FTEO  20\"x2  Close S Foam FTEO  20\"x2  Close S   Foam FTEC        Foam FT w/HT Foam FT w/HT and HN Attempted but pt too confused  Resume NV    Foam FT w/HN    Resume NV    Foam w/Alt UE Flex Foam, holding beach ball, lifting OH x5   Close S in // bars, frequent cues   Foam, holding beach ball, lifting OH 2x10   Close S     Firm with cone reach - 2 cones  5x to various heights - place from R hand to/from L hand Foam, holding beach ball, lifting OH 2x10   Close S     Firm with cone reach - 2 cones  10x to various heights - place from R hand to/from L hand    Hurdles fwd/lat frequent verbal and visual cues x4 laps ea  Hurdles 5 laps B UE fw/lat       STS  Arms crossed 5x with foam on chair  5x no foam       Ther Ex      " "  NuStep for LE Strength L4 10 mins  L4, 10 min  L3 10 mins L4 10 mins   SLR x 3 Hip Abd & Ext 2x10 ea Mario    Hip Abd & Ext 2x10 ea Mario   LAQ         Heel raise 2x10    Heel Raise 2x10 Heel Raise  2x10   Leg Press        Knee Flex Machine        Knee Ext Machine                                        Ther Activity        STS 10x with intermittent use of BUE on arm rests   10x with intermittent use of BUE on arm rests  10x with intermittent use of BUE on arm rests   Stepups F/L        Stepdowns                Gait Training                        Modalities                             Access Code: [email or web address]  URL: [email or web address]  Date: 01/27/2025  Prepared by: Liliam    Exercises  - Standing Hip Abduction with Counter Support  - 1 x daily - 7 x weekly - 2 sets - 10 reps  - Standing Hip Extension with Counter Support  - 1 x daily - 7 x weekly - 2 sets - 10 reps  - Standing Heel Raise with Support  - 1 x daily - 7 x weekly - 2 sets - 10 reps  - Standing March with Counter Support  - 1 x daily - 7 x weekly - 2 sets - 10 reps  - Seated Long Arc Quad  - 1 x daily - 7 x weekly - 2 sets - 10 reps - 3-5\" hold               "

## 2025-02-06 ENCOUNTER — APPOINTMENT (OUTPATIENT)
Dept: PHYSICAL THERAPY | Facility: CLINIC | Age: 89
End: 2025-02-06
Payer: MEDICARE

## 2025-02-11 ENCOUNTER — OFFICE VISIT (OUTPATIENT)
Dept: PHYSICAL THERAPY | Facility: CLINIC | Age: 89
End: 2025-02-11
Payer: MEDICARE

## 2025-02-11 DIAGNOSIS — Z74.09 IMPAIRED FUNCTIONAL MOBILITY, BALANCE, GAIT, AND ENDURANCE: ICD-10-CM

## 2025-02-11 DIAGNOSIS — R53.81 DEBILITY: Primary | ICD-10-CM

## 2025-02-11 DIAGNOSIS — F02.80 LATE ONSET ALZHEIMER'S DEMENTIA WITHOUT BEHAVIORAL DISTURBANCE (HCC): ICD-10-CM

## 2025-02-11 DIAGNOSIS — G30.1 LATE ONSET ALZHEIMER'S DEMENTIA WITHOUT BEHAVIORAL DISTURBANCE (HCC): ICD-10-CM

## 2025-02-11 PROCEDURE — 97110 THERAPEUTIC EXERCISES: CPT | Performed by: PHYSICAL THERAPIST

## 2025-02-11 PROCEDURE — 97112 NEUROMUSCULAR REEDUCATION: CPT | Performed by: PHYSICAL THERAPIST

## 2025-02-11 NOTE — PROGRESS NOTES
"Daily Note     Today's date: 2025  Patient name: Norman Walsh  : 1935  MRN: [id number]  Referring provider: Benjamín Montaño DO  Dx:   Encounter Diagnosis     ICD-10-CM    1. Debility  R53.81       2. Impaired functional mobility, balance, gait, and endurance  Z74.09       3. 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)  G30.1     F02.80                      Subjective: No new complaints.       Objective: See treatment diary below      Assessment: Tolerated treatment fair but due to dementia/cog, pt required constant VC to stay on task and to perform exercises correctly. He could only follow simple commands for exercises. Patient would benefit from continued PT      Plan: Progress treatment as tolerated.       Precautions: Fall Risk, moderate dementia, HTN, memory loss,        Manuals         8                             Neuro Re-Ed         SLS        Tandem Stance        Sidestepping Bar at mirror   4 laps  1 UE Bar at mirror   4 laps  No UE Blazepods with UE/LE taps/reach  30x      Hurdles        Backward Amb   30' x 3     HKM Bar at mirror  4 laps   1 UE 4 laps 1 UE 30' x 3     Foam FTEO Foam FTEO  20\"x2  Close S       Foam FTEC        Foam FT w/HT Foam FT w/HT and HN Attempted but pt too confused      Foam FT w/HN        Foam w/Alt UE Flex Foam, holding beach ball, lifting OH x5   Close S in // bars, frequent cues        Hurdles fwd/lat frequent verbal and visual cues x4 laps ea  Hurdles 5 laps B UE fw/lat  Hurdles 1 UE fw only 4 laps      STS  Arms crossed 5x with foam on chair  5x no foam  Arms crossed 2 x 5      Ther Ex        NuStep for LE Strength L4 10 mins  L4, 10 min L4, 10 min      SLR x 3 Hip Abd & Ext 2x10 ea Mario       LAQ         Heel raise 2x10        Leg Press        Knee Flex Machine        Knee Ext Machine                                        Ther Activity        STS 10x with intermittent use of BUE on arm rests       Stepups F/L   8\" 1 UE 10x " "up and over      Stepdowns                Gait Training                        Modalities                             Access Code: [email or web address]  URL: [email or web address]  Date: 01/27/2025  Prepared by: Liliam    Exercises  - Standing Hip Abduction with Counter Support  - 1 x daily - 7 x weekly - 2 sets - 10 reps  - Standing Hip Extension with Counter Support  - 1 x daily - 7 x weekly - 2 sets - 10 reps  - Standing Heel Raise with Support  - 1 x daily - 7 x weekly - 2 sets - 10 reps  - Standing March with Counter Support  - 1 x daily - 7 x weekly - 2 sets - 10 reps  - Seated Long Arc Quad  - 1 x daily - 7 x weekly - 2 sets - 10 reps - 3-5\" hold                 "

## 2025-02-12 ENCOUNTER — HOSPITAL ENCOUNTER (OUTPATIENT)
Dept: CT IMAGING | Facility: HOSPITAL | Age: 89
Discharge: HOME | End: 2025-02-12
Payer: MEDICARE

## 2025-02-12 DIAGNOSIS — K57.92 DIVERTICULITIS: ICD-10-CM

## 2025-02-12 PROCEDURE — 74176 CT ABD & PELVIS W/O CONTRAST: CPT

## 2025-02-13 ENCOUNTER — APPOINTMENT (OUTPATIENT)
Dept: PHYSICAL THERAPY | Facility: CLINIC | Age: 89
End: 2025-02-13
Payer: MEDICARE

## 2025-02-17 ENCOUNTER — RESULTS FOLLOW-UP (OUTPATIENT)
Dept: FAMILY MEDICINE CLINIC | Facility: CLINIC | Age: 89
End: 2025-02-17

## 2025-02-18 ENCOUNTER — EVALUATION (OUTPATIENT)
Dept: PHYSICAL THERAPY | Facility: CLINIC | Age: 89
End: 2025-02-18
Payer: MEDICARE

## 2025-02-18 DIAGNOSIS — Z74.09 IMPAIRED FUNCTIONAL MOBILITY, BALANCE, GAIT, AND ENDURANCE: ICD-10-CM

## 2025-02-18 DIAGNOSIS — F02.80 LATE ONSET ALZHEIMER'S DEMENTIA WITHOUT BEHAVIORAL DISTURBANCE (HCC): ICD-10-CM

## 2025-02-18 DIAGNOSIS — R53.81 DEBILITY: Primary | ICD-10-CM

## 2025-02-18 DIAGNOSIS — G30.1 LATE ONSET ALZHEIMER'S DEMENTIA WITHOUT BEHAVIORAL DISTURBANCE (HCC): ICD-10-CM

## 2025-02-18 PROCEDURE — 97112 NEUROMUSCULAR REEDUCATION: CPT | Performed by: PHYSICAL THERAPIST

## 2025-02-18 PROCEDURE — 97110 THERAPEUTIC EXERCISES: CPT | Performed by: PHYSICAL THERAPIST

## 2025-02-18 NOTE — PROGRESS NOTES
"Progress Note     Today's date: 2025  Patient name: Norman Walsh  : 1935  MRN: [id number]  Referring provider: Benjamín Montaño DO  Dx:   Encounter Diagnosis     ICD-10-CM    1. Debility  R53.81       2. Impaired functional mobility, balance, gait, and endurance  Z74.09       3. 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)  G30.1     F02.80                      Subjective: Doing okay. No pain.     Patient Goals  Patient goals for therapy: increased motion, improved balance and increased strength  Patient goal: \"To get my legs stronger, to walk better, to have better balance.\"  Pain  No pain reported    Objective: See treatment diary below      Assessment: PN completed this session. Pt is a poor historian and is unable to reflect on his performance. He demo significant improvement in LE strength, balance, and endurance per all outcome measures. Due to dementia, he had difficulty following directions so deferred static balance testing. At this time, pt is making good progress with PT and progressing towards his LTGs. Encourage pt's wife to walk with pt for physical activity and keep activities simply since pt easily confused and forgetful.       Goals  STGs:  1.  Initiate and complete HEP with verbal cues. - partially MET   2.  Improve BLE strength by 1/2 grade in 4 weeks. - partially MET  3.  Improve 5x STS time by > 5\"  in 4 weeks. - MET  LTGs:  1.  Patient to be I with HEP in 12 weeks.  2.  Improve BLE strength to > 4 to 4+/5 t/o in 12 weeks to improve function.  3.  Improve 5x STS time to < 16\" in 12 weeks to improve function.  4.  Improve TUG time to < 15\" in 12 weeks to improve function and decrease fall risk.  5.  Ambulation is improved to maximal level of function in 12 weeks.  6.  Stair negotiation is improved to maximal level of function in 12 weeks.  7.  Recreational performance in related activities is improved to maximal level of function in 12 weeks.  8.  ADL " "performance is improved to maximal level of function in 12 weeks.  9.  Improve 6 min walk test distance to > 1300' in 12 weeks.                Plan  Patient would benefit from: skilled physical therapy     Planned therapy interventions: balance, balance/weight bearing training, manual therapy, neuromuscular re-education, patient/caregiver education, postural training, self care, strengthening, stretching, therapeutic activities, therapeutic exercise, functional ROM exercises, flexibility, gait training, transfer training, home exercise program, graded exercise and graded activity     Frequency: 2x week  Duration in weeks: 2-4  Plan of Care beginning date: 1/13/2025  Plan of Care expiration date: 4/7/2025  Treatment plan discussed with: patient and family  Plan details: Modalities and therapy interventions prn.      Strength/Myotome Testing      Left Hip   Planes of Motion   Flexion: 4  Abduction: 4  Adduction: 4+  External rotation: 4  Internal rotation: 4     Right Hip   Planes of Motion   Flexion: 4  Abduction: 4  Adduction: 4+  External rotation: 4  Internal rotation: 4     Left Knee   Flexion: 4+  Extension: 4+     Right Knee   Flexion: 4+  Extension: 4+     Left Ankle/Foot   Dorsiflexion: 4  Plantar flexion: 4+     Right Ankle/Foot   Dorsiflexion: 4  Plantar flexion: 4+        Balance    FTEO: IE: 30\"  FTEC: IE: 8\" with sway     L SLS: IE: 2-3\"  R SLS: IE: 2-3\"      Neuro Exam:      Functional outcomes   6 minute walk test: IE: 857' 04\", 2/18: 945'   5x sit to stand: IE: 29.35\" with BUE use (seconds), 2/18: 19.81 sec no UE   TUG: IE: 20.59\" (seconds), 2/18: 11.97     Precautions: Fall Risk, moderate dementia, HTN, memory loss,        Manuals 1/29 2/4 2/11 2/18       8 PN                            Neuro Re-Ed         SLS        Tandem Stance        Sidestepping Bar at mirror   4 laps  1 UE Bar at mirror   4 laps  No UE Blazepods with UE/LE taps/reach  30x  Blazepods with UE/LE taps/reach  25x     Hurdles      " "  Backward Amb   30' x 3 Blazepods fw/bw 25 x 2    HKM Bar at mirror  4 laps   1 UE 4 laps 1 UE 30' x 3     Foam FTEO Foam FTEO  20\"x2  Close S       Foam FTEC        Foam FT w/HT Foam FT w/HT and HN Attempted but pt too confused      Foam FT w/HN        Foam w/Alt UE Flex Foam, holding beach ball, lifting OH x5   Close S in // bars, frequent cues        Hurdles fwd/lat frequent verbal and visual cues x4 laps ea  Hurdles 5 laps B UE fw/lat  Hurdles 1 UE fw only 4 laps      STS  Arms crossed 5x with foam on chair  5x no foam  Arms crossed 2 x 5      Ther Ex        NuStep for LE Strength L4 10 mins  L4, 10 min L4, 10 min  L4, 10 min    SLR x 3 Hip Abd & Ext 2x10 ea Mario       LAQ         Heel raise 2x10        Leg Press        Knee Flex Machine        Knee Ext Machine                                        Ther Activity        STS 10x with intermittent use of BUE on arm rests       Stepups F/L   8\" 1 UE 10x up and over      Stepdowns                Gait Training                        Modalities                             Access Code: [email or web address]  URL: [email or web address]  Date: 01/27/2025  Prepared by: Liliam    Exercises  - Standing Hip Abduction with Counter Support  - 1 x daily - 7 x weekly - 2 sets - 10 reps  - Standing Hip Extension with Counter Support  - 1 x daily - 7 x weekly - 2 sets - 10 reps  - Standing Heel Raise with Support  - 1 x daily - 7 x weekly - 2 sets - 10 reps  - Standing March with Counter Support  - 1 x daily - 7 x weekly - 2 sets - 10 reps  - Seated Long Arc Quad  - 1 x daily - 7 x weekly - 2 sets - 10 reps - 3-5\" hold                   "

## 2025-02-20 ENCOUNTER — OFFICE VISIT (OUTPATIENT)
Dept: PHYSICAL THERAPY | Facility: CLINIC | Age: 89
End: 2025-02-20
Payer: MEDICARE

## 2025-02-20 DIAGNOSIS — G30.1 LATE ONSET ALZHEIMER'S DEMENTIA WITHOUT BEHAVIORAL DISTURBANCE (HCC): ICD-10-CM

## 2025-02-20 DIAGNOSIS — R53.81 DEBILITY: Primary | ICD-10-CM

## 2025-02-20 DIAGNOSIS — Z74.09 IMPAIRED FUNCTIONAL MOBILITY, BALANCE, GAIT, AND ENDURANCE: ICD-10-CM

## 2025-02-20 DIAGNOSIS — F02.80 LATE ONSET ALZHEIMER'S DEMENTIA WITHOUT BEHAVIORAL DISTURBANCE (HCC): ICD-10-CM

## 2025-02-20 PROCEDURE — 97110 THERAPEUTIC EXERCISES: CPT | Performed by: PHYSICAL THERAPIST

## 2025-02-20 PROCEDURE — 97112 NEUROMUSCULAR REEDUCATION: CPT | Performed by: PHYSICAL THERAPIST

## 2025-02-20 NOTE — PROGRESS NOTES
"Daily Note     Today's date: 2025  Patient name: Norman Walsh  : 1935  MRN: [id number]  Referring provider: Benjamín Montaño DO  Dx:   Encounter Diagnosis     ICD-10-CM    1. Debility  R53.81       2. Impaired functional mobility, balance, gait, and endurance  Z74.09       3. 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)  G30.1     F02.80                      Subjective: No new complaints.       Objective: See treatment diary below      Assessment: Constant VC required for pt to participate and stay on task. He was able to complete STS with no UE support with encouragement. He needed a lot of encouragement to perform TE today. Patient would benefit from continued PT      Plan: Progress treatment as tolerated.       Precautions: Fall Risk, moderate dementia, HTN, memory loss,        Manuals       8 PN 2                           Neuro Re-Ed         SLS        Tandem Stance        Sidestepping Bar at mirror   4 laps  1 UE Bar at mirror   4 laps  No UE Blazepods with UE/LE taps/reach  30x  Blazepods with UE/LE taps/reach  25x  Blazepods with UE/LE taps/reach  30x     UE reaching 30x     LE taps 30x    Hurdles     Fw only 5 laps    Backward Amb   30' x 3 Blazepods fw/bw 25 x 2    HKM Bar at mirror  4 laps   1 UE 4 laps 1 UE 30' x 3     Foam FTEO Foam FTEO  20\"x2  Close S       Foam FTEC        Foam FT w/HT Foam FT w/HT and HN Attempted but pt too confused      Foam FT w/HN        Foam w/Alt UE Flex Foam, holding beach ball, lifting OH x5   Close S in // bars, frequent cues        Hurdles fwd/lat frequent verbal and visual cues x4 laps ea  Hurdles 5 laps B UE fw/lat  Hurdles 1 UE fw only 4 laps   Hurdles fw only 5 laps    STS  Arms crossed 5x with foam on chair  5x no foam  Arms crossed 2 x 5   Arms crossed 10x    Ther Ex        NuStep for LE Strength L4 10 mins  L4, 10 min L4, 10 min  L4, 10 min L4, 10 min    SLR x 3 Hip Abd & Ext 2x10 ea Mario       LAQ         " "Heel raise 2x10        Leg Press        Knee Flex Machine        Knee Ext Machine                                        Ther Activity        STS 10x with intermittent use of BUE on arm rests       Stepups F/L   8\" 1 UE 10x up and over   Stairs 5 x 2    Stepdowns                Gait Training                        Modalities                             Access Code: [email or web address]  URL: [email or web address]  Date: 01/27/2025  Prepared by: Liliam    Exercises  - Standing Hip Abduction with Counter Support  - 1 x daily - 7 x weekly - 2 sets - 10 reps  - Standing Hip Extension with Counter Support  - 1 x daily - 7 x weekly - 2 sets - 10 reps  - Standing Heel Raise with Support  - 1 x daily - 7 x weekly - 2 sets - 10 reps  - Standing March with Counter Support  - 1 x daily - 7 x weekly - 2 sets - 10 reps  - Seated Long Arc Quad  - 1 x daily - 7 x weekly - 2 sets - 10 reps - 3-5\" hold                     "

## 2025-02-24 ENCOUNTER — OFFICE VISIT (OUTPATIENT)
Dept: INTERNAL MEDICINE CLINIC | Facility: CLINIC | Age: 89
End: 2025-02-24
Payer: MEDICARE

## 2025-02-24 VITALS
WEIGHT: 198 LBS | TEMPERATURE: 96.1 F | HEART RATE: 88 BPM | HEIGHT: 70 IN | OXYGEN SATURATION: 98 % | DIASTOLIC BLOOD PRESSURE: 78 MMHG | SYSTOLIC BLOOD PRESSURE: 150 MMHG | BODY MASS INDEX: 28.35 KG/M2

## 2025-02-24 DIAGNOSIS — F02.80 LATE ONSET ALZHEIMER'S DEMENTIA WITHOUT BEHAVIORAL DISTURBANCE (HCC): ICD-10-CM

## 2025-02-24 DIAGNOSIS — G30.1 LATE ONSET ALZHEIMER'S DEMENTIA WITHOUT BEHAVIORAL DISTURBANCE (HCC): ICD-10-CM

## 2025-02-24 DIAGNOSIS — E56.9 VITAMIN DEFICIENCY: ICD-10-CM

## 2025-02-24 DIAGNOSIS — R10.30 LOWER ABDOMINAL PAIN: Primary | ICD-10-CM

## 2025-02-24 DIAGNOSIS — D53.9 NUTRITIONAL ANEMIA, UNSPECIFIED: ICD-10-CM

## 2025-02-24 PROCEDURE — 99214 OFFICE O/P EST MOD 30 MIN: CPT | Performed by: INTERNAL MEDICINE

## 2025-02-24 PROCEDURE — G2211 COMPLEX E/M VISIT ADD ON: HCPCS | Performed by: INTERNAL MEDICINE

## 2025-02-24 RX ORDER — TAMSULOSIN HYDROCHLORIDE 0.4 MG/1
0.4 CAPSULE ORAL
COMMUNITY
Start: 2025-02-17 | End: 2026-02-17

## 2025-02-24 NOTE — ASSESSMENT & PLAN NOTE
The patient is becoming more sedentary and debilitated secondary to this   Orders:  •  Ambulatory Referral to Occupational Therapy; Future

## 2025-02-24 NOTE — PROGRESS NOTES
"Name: Norman Walsh      : 1935      MRN: [id number]  Encounter Provider: Benjamín Montaño DO  Encounter Date: 2025   Encounter department: [facility]  :  Assessment & Plan  Lower abdominal pain  CT negative and benign labs. No white count and benign physical exam.  Orders:  •  Ambulatory Referral to Gastroenterology; Future    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)  The patient is becoming more sedentary and debilitated secondary to this   Orders:  •  Ambulatory Referral to Occupational Therapy; Future    Vitamin deficiency  The patient's wife is very concerned regarding possible B12 def  MCV normal CBC normal  Orders:  •  Vitamin B12; Future    Nutritional anemia, unspecified    Orders:  •  Vitamin B12; Future           History of Present Illness   The patient was seen and examined and has no complaints at this time.  Reviewed the CT and noted thickening of bladder.  Continue to follow up with urology      Review of Systems   Constitutional:  Negative for chills, fatigue and fever.   HENT: Negative.     Respiratory:  Negative for cough, chest tightness and shortness of breath.    Cardiovascular:  Negative for chest pain and palpitations.   Gastrointestinal:  Negative for abdominal pain, constipation, diarrhea, nausea and vomiting.   Genitourinary: Negative.    Musculoskeletal:  Negative for back pain and myalgias.   Skin: Negative.    Neurological: Negative.    Psychiatric/Behavioral:  Negative for dysphoric mood. The patient is not nervous/anxious.        Objective   /78 (Patient Position: Sitting, Cuff Size: Large)   Pulse 88   Temp (!) 96.1 °F (35.6 °C) (Tympanic)   Ht 5' 10\" (1.778 m)   Wt 89.8 kg (198 lb)   SpO2 98%   BMI 28.41 kg/m²      Physical Exam  Vitals and nursing note reviewed.   Constitutional:       General: He is not in acute distress.     Appearance: He is well-developed.   HENT:      Head: Normocephalic and atraumatic. "   Eyes:      Conjunctiva/sclera: Conjunctivae normal.   Cardiovascular:      Rate and Rhythm: Normal rate and regular rhythm.      Heart sounds: No murmur heard.  Pulmonary:      Effort: Pulmonary effort is normal. No respiratory distress.      Breath sounds: Normal breath sounds.   Abdominal:      Palpations: Abdomen is soft.      Tenderness: There is no abdominal tenderness.   Musculoskeletal:         General: No swelling.      Cervical back: Neck supple.   Skin:     General: Skin is warm and dry.      Capillary Refill: Capillary refill takes less than 2 seconds.   Neurological:      Mental Status: He is alert.   Psychiatric:         Mood and Affect: Mood normal.

## 2025-02-25 ENCOUNTER — OFFICE VISIT (OUTPATIENT)
Dept: PHYSICAL THERAPY | Facility: CLINIC | Age: 89
End: 2025-02-25
Payer: MEDICARE

## 2025-02-25 DIAGNOSIS — Z74.09 IMPAIRED FUNCTIONAL MOBILITY, BALANCE, GAIT, AND ENDURANCE: ICD-10-CM

## 2025-02-25 DIAGNOSIS — G30.1 LATE ONSET ALZHEIMER'S DEMENTIA WITHOUT BEHAVIORAL DISTURBANCE (HCC): ICD-10-CM

## 2025-02-25 DIAGNOSIS — R53.81 DEBILITY: Primary | ICD-10-CM

## 2025-02-25 DIAGNOSIS — F02.80 LATE ONSET ALZHEIMER'S DEMENTIA WITHOUT BEHAVIORAL DISTURBANCE (HCC): ICD-10-CM

## 2025-02-25 PROCEDURE — 97110 THERAPEUTIC EXERCISES: CPT | Performed by: PHYSICAL THERAPIST

## 2025-02-25 PROCEDURE — 97112 NEUROMUSCULAR REEDUCATION: CPT | Performed by: PHYSICAL THERAPIST

## 2025-02-25 NOTE — PROGRESS NOTES
"Daily Note     Today's date: 2025  Patient name: Norman Walsh  : 1935  MRN: [id number]  Referring provider: Benjamín Montaño DO  Dx:   Encounter Diagnosis     ICD-10-CM    1. Debility  R53.81       2. Impaired functional mobility, balance, gait, and endurance  Z74.09       3. 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)  G30.1     F02.80                      Subjective: No new complaints.       Objective: See treatment diary below      Assessment: Pt very easily distractible and required constant cueing to stay on task. He also required constant motivation to participate in PT today and requested to go home early multiple times throughout the session. Likely very minimal carry over with TE due to severe dementia and lack of motivation. Reviewed walking program with pt's wife and plan to D/C [place].       Plan: D/C [place].      Precautions: Fall Risk, moderate dementia, HTN, memory loss,        Manuals     3  8 PN 2                           Neuro Re-Ed         SLS        Tandem Stance        Sidestepping Blazepods with UE/LE taps/reach  30x   Blazepods with UE/LE taps/reach  30x  Blazepods with UE/LE taps/reach  25x  Blazepods with UE/LE taps/reach  30x     UE reaching 30x     LE taps 30x    Hurdles     Fw only 5 laps    Backward Amb   30' x 3 Blazepods fw/bw 25 x 2    HKM   30' x 3     Foam FTEO        Foam FTEC        Foam FT w/HT        Foam FT w/HN        Foam w/Alt UE Flex         Hurdles fw only 5 laps B UE   Hurdles 1 UE fw only 4 laps   Hurdles fw only 5 laps    STS Attempted, pt deferred   Arms crossed 2 x 5   Arms crossed 10x    Ther Ex        NuStep for LE Strength L4, 10 min   L4, 10 min  L4, 10 min L4, 10 min    SLR x 3        LAQ                Leg Press        Knee Flex Machine        Knee Ext Machine        UBE Lvl 1.0, 5 min                                Ther Activity        STS        Stepups F/L Stairs 8x   8\" 1 UE 10x up and over   Stairs 5 x 2  " "  Stepdowns                Gait Training                        Modalities                             Access Code: [email or web address]  URL: [email or web address]  Date: 01/27/2025  Prepared by: Liliam    Exercises  - Standing Hip Abduction with Counter Support  - 1 x daily - 7 x weekly - 2 sets - 10 reps  - Standing Hip Extension with Counter Support  - 1 x daily - 7 x weekly - 2 sets - 10 reps  - Standing Heel Raise with Support  - 1 x daily - 7 x weekly - 2 sets - 10 reps  - Standing March with Counter Support  - 1 x daily - 7 x weekly - 2 sets - 10 reps  - Seated Long Arc Quad  - 1 x daily - 7 x weekly - 2 sets - 10 reps - 3-5\" hold                       "

## 2025-02-27 ENCOUNTER — OFFICE VISIT (OUTPATIENT)
Dept: PHYSICAL THERAPY | Facility: CLINIC | Age: 89
End: 2025-02-27
Payer: MEDICARE

## 2025-02-27 ENCOUNTER — APPOINTMENT (OUTPATIENT)
Dept: LAB | Facility: CLINIC | Age: 89
End: 2025-02-27
Payer: MEDICARE

## 2025-02-27 DIAGNOSIS — D53.9 NUTRITIONAL ANEMIA, UNSPECIFIED: ICD-10-CM

## 2025-02-27 DIAGNOSIS — E56.9 VITAMIN DEFICIENCY: ICD-10-CM

## 2025-02-27 DIAGNOSIS — R10.30 LOWER ABDOMINAL PAIN: ICD-10-CM

## 2025-02-27 DIAGNOSIS — R53.81 DEBILITY: Primary | ICD-10-CM

## 2025-02-27 DIAGNOSIS — K57.92 DIVERTICULITIS: ICD-10-CM

## 2025-02-27 DIAGNOSIS — Z74.09 IMPAIRED FUNCTIONAL MOBILITY, BALANCE, GAIT, AND ENDURANCE: ICD-10-CM

## 2025-02-27 DIAGNOSIS — G30.1 LATE ONSET ALZHEIMER'S DEMENTIA WITHOUT BEHAVIORAL DISTURBANCE (HCC): ICD-10-CM

## 2025-02-27 DIAGNOSIS — F02.80 LATE ONSET ALZHEIMER'S DEMENTIA WITHOUT BEHAVIORAL DISTURBANCE (HCC): ICD-10-CM

## 2025-02-27 LAB
BASOPHILS # BLD AUTO: 0.03 THOUSANDS/ÂΜL (ref 0–0.1)
BASOPHILS NFR BLD AUTO: 0 % (ref 0–1)
BILIRUB UR QL STRIP: NEGATIVE
CLARITY UR: CLEAR
COLOR UR: NORMAL
EOSINOPHIL # BLD AUTO: 0.16 THOUSAND/ÂΜL (ref 0–0.61)
EOSINOPHIL NFR BLD AUTO: 2 % (ref 0–6)
ERYTHROCYTE [DISTWIDTH] IN BLOOD BY AUTOMATED COUNT: 13.1 % (ref 11.6–15.1)
GLUCOSE UR STRIP-MCNC: NEGATIVE MG/DL
HCT VFR BLD AUTO: 44.7 % (ref 36.5–49.3)
HGB BLD-MCNC: 15.1 G/DL (ref 12–17)
HGB UR QL STRIP.AUTO: NEGATIVE
IMM GRANULOCYTES # BLD AUTO: 0.02 THOUSAND/UL (ref 0–0.2)
IMM GRANULOCYTES NFR BLD AUTO: 0 % (ref 0–2)
KETONES UR STRIP-MCNC: NEGATIVE MG/DL
LEUKOCYTE ESTERASE UR QL STRIP: NEGATIVE
LYMPHOCYTES # BLD AUTO: 1.4 THOUSANDS/ÂΜL (ref 0.6–4.47)
LYMPHOCYTES NFR BLD AUTO: 20 % (ref 14–44)
MCH RBC QN AUTO: 32.3 PG (ref 26.8–34.3)
MCHC RBC AUTO-ENTMCNC: 33.8 G/DL (ref 31.4–37.4)
MCV RBC AUTO: 96 FL (ref 82–98)
MONOCYTES # BLD AUTO: 0.56 THOUSAND/ÂΜL (ref 0.17–1.22)
MONOCYTES NFR BLD AUTO: 8 % (ref 4–12)
NEUTROPHILS # BLD AUTO: 4.74 THOUSANDS/ÂΜL (ref 1.85–7.62)
NEUTS SEG NFR BLD AUTO: 70 % (ref 43–75)
NITRITE UR QL STRIP: NEGATIVE
NRBC BLD AUTO-RTO: 0 /100 WBCS
PH UR STRIP.AUTO: 7 [PH]
PLATELET # BLD AUTO: 217 THOUSANDS/UL (ref 149–390)
PMV BLD AUTO: 8.8 FL (ref 8.9–12.7)
PROT UR STRIP-MCNC: NEGATIVE MG/DL
RBC # BLD AUTO: 4.68 MILLION/UL (ref 3.88–5.62)
SP GR UR STRIP.AUTO: 1.01 (ref 1–1.03)
UROBILINOGEN UR STRIP-ACNC: <2 MG/DL
VIT B12 SERPL-MCNC: 237 PG/ML (ref 180–914)
WBC # BLD AUTO: 6.91 THOUSAND/UL (ref 4.31–10.16)

## 2025-02-27 PROCEDURE — 97112 NEUROMUSCULAR REEDUCATION: CPT | Performed by: PHYSICAL THERAPIST

## 2025-02-27 PROCEDURE — 82607 VITAMIN B-12: CPT

## 2025-02-27 PROCEDURE — 36415 COLL VENOUS BLD VENIPUNCTURE: CPT

## 2025-02-27 PROCEDURE — 97116 GAIT TRAINING THERAPY: CPT | Performed by: PHYSICAL THERAPIST

## 2025-02-27 PROCEDURE — 81003 URINALYSIS AUTO W/O SCOPE: CPT

## 2025-02-27 PROCEDURE — 97110 THERAPEUTIC EXERCISES: CPT | Performed by: PHYSICAL THERAPIST

## 2025-02-27 PROCEDURE — 85025 COMPLETE CBC W/AUTO DIFF WBC: CPT

## 2025-02-27 NOTE — PROGRESS NOTES
Daily Note     Today's date: 2025  Patient name: Norman Walsh  : 1935  MRN: [id number]  Referring provider: Benjamín Montaño DO  Dx:   Encounter Diagnosis     ICD-10-CM    1. Debility  R53.81       2. Impaired functional mobility, balance, gait, and endurance  Z74.09       3. 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)  G30.1     F02.80                      Subjective: No new complaints.       Objective: See treatment diary below      Assessment: Due to poor memory, pt required constant cueing and demonstration to perform TE properly. Attempted multiple exercises today but pt refused. He lacked motivation and required constant encouragement to participate. He demo shuffling gait but did not respond to VC to improve foot clearance. Patient will be d/c today with recommendation to perform walking program with wife.      Plan: Discharge     Precautions: Fall Risk, moderate dementia, HTN, memory loss,        Manuals     3 4 8 PN 2                           Neuro Re-Ed         SLS        Tandem Stance        Sidestepping Blazepods with UE/LE taps/reach  30x  Blazepods with UE/LE taps/reach  30x  Blazepods with UE/LE taps/reach  30x  Blazepods with UE/LE taps/reach  25x  Blazepods with UE/LE taps/reach  30x     UE reaching 30x     LE taps 30x    Hurdles  Fw only 5 laps   1 UE    Fw only 5 laps    Backward Amb   30' x 3 Blazepods fw/bw 25 x 2    HKM   30' x 3     Foam FTEO        Foam FTEC        Foam FT w/HT        Foam FT w/HN        Foam w/Alt UE Flex         Hurdles fw only 5 laps B UE   Hurdles 1 UE fw only 4 laps   Hurdles fw only 5 laps    STS Attempted, pt deferred  Attempted, pt refused  Arms crossed 2 x 5   Arms crossed 10x    Ther Ex        NuStep for LE Strength L4, 10 min  L4, 10 min  L4, 10 min  L4, 10 min L4, 10 min    SLR x 3        LAQ                Leg Press        Knee Flex Machine        Knee Ext Machine        UBE Lvl 1.0, 5 min  Lvl 1.0, 5  "min                               Ther Activity        STS        Stepups F/L Stairs 8x   8\" 1 UE 10x up and over   Stairs 5 x 2    Stepdowns                Gait Training          270 ft x 1, 135 ft x 1              Modalities                             Access Code: [email or web address]  URL: [email or web address]  Date: 01/27/2025  Prepared by: Liliam    Exercises  - Standing Hip Abduction with Counter Support  - 1 x daily - 7 x weekly - 2 sets - 10 reps  - Standing Hip Extension with Counter Support  - 1 x daily - 7 x weekly - 2 sets - 10 reps  - Standing Heel Raise with Support  - 1 x daily - 7 x weekly - 2 sets - 10 reps  - Standing March with Counter Support  - 1 x daily - 7 x weekly - 2 sets - 10 reps  - Seated Long Arc Quad  - 1 x daily - 7 x weekly - 2 sets - 10 reps - 3-5\" hold    Access Code: [email or web address]  URL: [email or web address]  Date: 02/27/2025  Prepared by: Luisa Day  - Walking March  - 1 x daily - 7 x weekly - 6 reps  - Forward Step Up with Counter Support  - 4 x weekly - 2 sets - 10 reps  - Side Stepping with Counter Support  - 1 x daily - 7 x weekly - 6 reps  - Sit to Stand  - 1 x daily - 7 x weekly - 2 sets - 10 reps                       "

## 2025-03-03 NOTE — PROGRESS NOTES
OCCUPATIONAL THERAPY INITIAL EVALUATION      Visit/Unit Tracking  AUTH Status: Not Required Date 3/4/25              Visits  Authed: BOMN Used 1 (IE)               Remaining                  PLAN OF CARE START: 3/4/25   PLAN OF CARE END: 3/26/2025  PROGRESS NOTE/RE-EVAL DUE: pt scheduled for two visits for caregiver education and HEP   FREQUENCY: 1x week for two weeks   PRECAUTIONS: atrial fibrillation, hypertension, sun downing   DIAGNOSIS: Late onset Alzheimer's dementia without behavioral disturbance   INSURANCE: Medicare       Today's date: 3/4/2025  Patient name: Norman Walhs  : 1935  MRN: [id number]  Referring provider: Benjamín Montaño DO  Dx:   Encounter Diagnosis     ICD-10-CM    1. 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)  G30.1     F02.80           ASSESSMENT:     SKILLED ANALYSIS:  Pt is a  89 year old male presenting to OP OT s/p late onset Alzheimer's dementia without behavioral disturbance. Pt is independent with ADLs and max assist with IADLs. Pt lacks insight and awareness to current deficits. Pt unable to answer questions for occupational profile, and received information from wife. Wife reports challenges with object identification. Wife reports she would like for  to gain interest in activities and find meaningful/purposeful activities. From clinical observation pt has challenges with attention to conversation and tasks. Pt is very easily distracted by environment. Wife informs that their are alarms on doors and mats for safety of patients wear about's to avoid wandering. Pt is demonstrating deficits based on clinical observation and the following assessments: trail making part A, MOCA. Post assessments pt demonstrating the following impairments: lack of orientation, awareness to time and place, decreased attention. Recommend OP OT 1x/wk for 2 weeks with focus on aforementioned deficits to maximize functional performance and improve QOL. Findings and  "recommendations discussed with pt, and they are in agreement. Educated pt on charges of insurance, assessments performed with standardized norms, POC, goal creation, and OP OT services.     SUBJECTIVE:     Subjective  Occupational Profile  *Type of home: split level home with 6 steps up to second floor   *Lives with: wife   *Vocation: retired   *Driving: Pt not currently driving   *DME: grab bars   *AE for ADLs: not currently used   *ADL status: min assist with clothing selection, and independent with all other ADLS   *IADL status (cooking, cleaning, community mobility, medication management, finances): wife responsible for all IADLs, pt responsible for washing dishes and vacuuming able to complete independently   *Leisure: previously enjoyed golfing, enjoys yard work, wife reports she takes pt out into community to help with shopping tasks     PATIENT GOAL: \"To increase attention\"    PAIN: 0    HISTORY OF PRESENT ILLNESS:   Pt is a 89 y.o. male who was referred to Occupational Therapy s/p late onset Alzheimer's dementia without behavioral disturbance. Pt symptoms started in 2019 when he had a CT scan of the head in July 2019 for evaluation of memory disturbance. Pt has issues with short term memory recall. His wife and he report that he is a safe , despite neurologist recommendation to have OT evaluate driving abilities with Fit to Drive examination. Neurologist pt has some difficulty in remembering new information and conversation. Pt had MRI 1/26/24 that's showed features of cortical atrophy and age-related dilatation of the ventricles. Patient sometimes has difficulty following or understanding a conversation. Patient sometimes has problems remembering to take medication according to geriatric medicine EMR.         PMH:   Past Medical History:   Diagnosis Date    Accidental bee sting 09/12/2023    Anemia 12/10/2012    BPH with obstruction/lower urinary tract symptoms     Cataract     Dementia (HCC) mild " "dementia diagnose 2-3-23d by neurologist    Excessive sweating 2019    Frequency of micturition     Hypercholesteremia     Hypertension     Memory loss     Poor urinary stream        Past Surgical Hx:   Past Surgical History:   Procedure Laterality Date    CATARACT EXTRACTION Bilateral 2019, 2019    HERNIA REPAIR            Objective    OBJECTIVE:   Impairment Observations:    Edu Cognitive Assessment (MoCA)  \"screen of cognitive abilities designed to detect mild cognitive dysfunction.\"  NORMS:   -Normal: 26+  -Mild Cognitive Impairment: 18-25   -Moderate Cognitive Impairment: 10-17  -Severe Cognitive Impairment: <10        STATUS ON IE: 3/4/25 Comments                                         COGNITIVE FXN        VERSION 8.1             MOCA        Education Level = 12 years     Visuospatial/executive functioning 0/5     Naming 1/3     Memory: 1st trial: 0/5     Memory: 2nd trial: 0/5     Attention/concentration 0/2     List of letters:  0/1     Serial Seven Subtraction: 0/3     Language/sentence repetition: 0/2     Language Fluency:  0/1     Abstract/Correlational Thinkin/2     Delayed Recall: 0/5     Orientation: 0/6     Memory Index Score 0/15                                        Total Score          TRAIL MAKING TEST:   \"widely used test to assess executive function in patients with neuro injury. Successful performance of the TMT requires a variety of mental abilities including letter and number recognition mental flexibility, visual scanning, and motor function. Norms are based on age related scores\"     STATUS ON IE: 3/4/25   TRAIL MAKING PART A     NORM:  Practice Set: 13 seconds with min prompting    Test: able to get to 12 with min prompting and then max prompting   (2 minutes 20 seconds)          TRAIL MAKING PART B    NORM:  Unable to perform with       CONTEXTUAL MEMORY TEST (1993 version):   \"assesses immediate and delayed recall of 20 objects related to a scene in children " "and adults with memory impairments\"   NORMS:   *not applicable to current in house version     TYPE OF RECALL STATUS ON IE: 3/4/25 COMMENTS   Immediate recall Unable to perform  Pt able to correctly identify three items on sheet when visually looking at them    Delayed recall  Unable to perform         INTERVENTIONS:   Internal/external memory aides  Caregiver education  Assistive technology/adaptive equipment for alzheimer's/safety   Following single step verbal/written directions   Categorization  Attention to task/conversation   Safety/Awareness  Memory Book         GOALS:     Caregiver will represent good understanding of assistive devices, technology, adaptive equipment etc. that can aide with safety and routine management to be used on daily basis for increased ease with ADLS and daily routine.     Pt will gain 1-3 leisure activities/purposeful activities of interest to stay on task for 5 min focusing on sustained attention to purposeful tasks for increased QOL.     Caregiver will implement daily exercise program  for 15-30 minutes for patient to be added into his daily routine for participation in purposeful activities and increased cardiovascular exercise for improved cognitive functioning.     Caregiver will represent good understanding of home modifications and adaptive equipment for increased safety in the home involving symptoms associated with alzheimer for increased patient safety.     Pt will utilize external memory aides/memory books, reminders, and alarms for increased participation in ADL tasks.     Caregiver will represent good understanding of resources available/ support groups for caring for a family member with alzheimer's for increased QOL for pt and caregiver.     Caregiver will represent good understanding of incorporating sensory strategies like aromatherapy, bright light therapy, and multisensory stimulation into patient's daily life for increased QOL and social participation.       "

## 2025-03-04 ENCOUNTER — EVALUATION (OUTPATIENT)
Dept: OCCUPATIONAL THERAPY | Facility: CLINIC | Age: 89
End: 2025-03-04
Payer: MEDICARE

## 2025-03-04 DIAGNOSIS — F02.80 LATE ONSET ALZHEIMER'S DEMENTIA WITHOUT BEHAVIORAL DISTURBANCE (HCC): Primary | ICD-10-CM

## 2025-03-04 DIAGNOSIS — G30.1 LATE ONSET ALZHEIMER'S DEMENTIA WITHOUT BEHAVIORAL DISTURBANCE (HCC): Primary | ICD-10-CM

## 2025-03-04 PROCEDURE — 97166 OT EVAL MOD COMPLEX 45 MIN: CPT

## 2025-03-04 PROCEDURE — 97530 THERAPEUTIC ACTIVITIES: CPT

## 2025-03-07 DIAGNOSIS — E56.9 VITAMIN DEFICIENCY: Primary | ICD-10-CM

## 2025-03-07 DIAGNOSIS — D51.9 ANEMIA DUE TO VITAMIN B12 DEFICIENCY, UNSPECIFIED B12 DEFICIENCY TYPE: ICD-10-CM

## 2025-03-26 ENCOUNTER — APPOINTMENT (OUTPATIENT)
Dept: OCCUPATIONAL THERAPY | Facility: CLINIC | Age: 89
End: 2025-03-26
Payer: MEDICARE

## 2025-03-30 DIAGNOSIS — E78.5 HYPERLIPIDEMIA, UNSPECIFIED HYPERLIPIDEMIA TYPE: ICD-10-CM

## 2025-03-30 RX ORDER — SIMVASTATIN 40 MG
40 TABLET ORAL DAILY
Qty: 90 TABLET | Refills: 1 | Status: SHIPPED | OUTPATIENT
Start: 2025-03-30

## 2025-03-31 ENCOUNTER — OFFICE VISIT (OUTPATIENT)
Dept: INTERNAL MEDICINE CLINIC | Facility: CLINIC | Age: 89
End: 2025-03-31
Payer: MEDICARE

## 2025-03-31 VITALS
TEMPERATURE: 96.8 F | OXYGEN SATURATION: 95 % | HEIGHT: 70 IN | HEART RATE: 85 BPM | BODY MASS INDEX: 27.35 KG/M2 | DIASTOLIC BLOOD PRESSURE: 74 MMHG | WEIGHT: 191 LBS | SYSTOLIC BLOOD PRESSURE: 150 MMHG

## 2025-03-31 DIAGNOSIS — E78.5 HYPERLIPIDEMIA, UNSPECIFIED HYPERLIPIDEMIA TYPE: ICD-10-CM

## 2025-03-31 DIAGNOSIS — I10 BENIGN ESSENTIAL HYPERTENSION: ICD-10-CM

## 2025-03-31 DIAGNOSIS — Z00.00 MEDICARE ANNUAL WELLNESS VISIT, SUBSEQUENT: Primary | ICD-10-CM

## 2025-03-31 DIAGNOSIS — D51.9 ANEMIA DUE TO VITAMIN B12 DEFICIENCY, UNSPECIFIED B12 DEFICIENCY TYPE: ICD-10-CM

## 2025-03-31 PROCEDURE — 99214 OFFICE O/P EST MOD 30 MIN: CPT | Performed by: INTERNAL MEDICINE

## 2025-03-31 PROCEDURE — G2211 COMPLEX E/M VISIT ADD ON: HCPCS | Performed by: INTERNAL MEDICINE

## 2025-03-31 PROCEDURE — G0439 PPPS, SUBSEQ VISIT: HCPCS | Performed by: INTERNAL MEDICINE

## 2025-03-31 NOTE — ASSESSMENT & PLAN NOTE
Follow up on labs   BP hypertensive and no change at this time  Orders:  •  Comprehensive metabolic panel; Future  •  Lipid Panel with Direct LDL reflex; Future  •  CBC and differential; Future

## 2025-03-31 NOTE — PATIENT INSTRUCTIONS
Medicare Preventive Visit Patient Instructions  Thank you for completing your Welcome to Medicare Visit or Medicare Annual Wellness Visit today. Your next wellness visit will be due in one year (4/1/2026).  The screening/preventive services that you may require over the next 5-10 years are detailed below. Some tests may not apply to you based off risk factors and/or age. Screening tests ordered at today's visit but not completed yet may show as past due. Also, please note that scanned in results may not display below.  Preventive Screenings:  Service Recommendations Previous Testing/Comments   Colorectal Cancer Screening  Colonoscopy    Fecal Occult Blood Test (FOBT)/Fecal Immunochemical Test (FIT)  Fecal DNA/Cologuard Test  Flexible Sigmoidoscopy Age: 45-75 years old   Colonoscopy: every 10 years (May be performed more frequently if at higher risk)  OR  FOBT/FIT: every 1 year  OR  Cologuard: every 3 years  OR  Sigmoidoscopy: every 5 years  Screening may be recommended earlier than age 45 if at higher risk for colorectal cancer. Also, an individualized decision between you and your healthcare provider will decide whether screening between the ages of 76-85 would be appropriate. Colonoscopy: Not on file  FOBT/FIT: Not on file  Cologuard: Not on file  Sigmoidoscopy: Not on file    Screening Not Indicated     Prostate Cancer Screening Individualized decision between patient and health care provider in men between ages of 55-69   Medicare will cover every 12 months beginning on the day after your 50th birthday PSA: 7.89 ng/mL     History Prostate Cancer  Screening Not Indicated     Hepatitis C Screening Once for adults born between 1945 and 1965  More frequently in patients at high risk for Hepatitis C Hep C Antibody: Not on file        Diabetes Screening 1-2 times per year if you're at risk for diabetes or have pre-diabetes Fasting glucose: 100 mg/dL (7/20/2020)  A1C: No results in last 5 years (No results in last 5  years)  Screening Current   Cholesterol Screening Once every 5 years if you don't have a lipid disorder. May order more often based on risk factors. Lipid panel: 08/30/2024  Screening Not Indicated  History Lipid Disorder      Other Preventive Screenings Covered by Medicare:  Abdominal Aortic Aneurysm (AAA) Screening: covered once if your at risk. You're considered to be at risk if you have a family history of AAA or a male between the age of 65-75 who smoking at least 100 cigarettes in your lifetime.  Lung Cancer Screening: covers low dose CT scan once per year if you meet all of the following conditions: (1) Age 55-77; (2) No signs or symptoms of lung cancer; (3) Current smoker or have quit smoking within the last 15 years; (4) You have a tobacco smoking history of at least 20 pack years (packs per day x number of years you smoked); (5) You get a written order from a healthcare provider.  Glaucoma Screening: covered annually if you're considered high risk: (1) You have diabetes OR (2) Family history of glaucoma OR (3)  aged 50 and older OR (4)  American aged 65 and older  Osteoporosis Screening: covered every 2 years if you meet one of the following conditions: (1) Have a vertebral abnormality; (2) On glucocorticoid therapy for more than 3 months; (3) Have primary hyperparathyroidism; (4) On osteoporosis medications and need to assess response to drug therapy.  HIV Screening: covered annually if you're between the age of 15-65. Also covered annually if you are younger than 15 and older than 65 with risk factors for HIV infection. For pregnant patients, it is covered up to 3 times per pregnancy.    Immunizations:  Immunization Recommendations   Influenza Vaccine Annual influenza vaccination during flu season is recommended for all persons aged >= 6 months who do not have contraindications   Pneumococcal Vaccine   * Pneumococcal conjugate vaccine = PCV13 (Prevnar 13), PCV15 (Vaxneuvance),  PCV20 (Prevnar 20)  * Pneumococcal polysaccharide vaccine = PPSV23 (Pneumovax) Adults 19-63 yo with certain risk factors or if 65+ yo  If never received any pneumonia vaccine: recommend Prevnar 20 (PCV20)  Give PCV20 if previously received 1 dose of PCV13 or PPSV23   Hepatitis B Vaccine 3 dose series if at intermediate or high risk (ex: diabetes, end stage renal disease, liver disease)   Respiratory syncytial virus (RSV) Vaccine - COVERED BY MEDICARE PART D  * RSVPreF3 (Arexvy) CDC recommends that adults 60 years of age and older may receive a single dose of RSV vaccine using shared clinical decision-making (SCDM)   Tetanus (Td) Vaccine - COST NOT COVERED BY MEDICARE PART B Following completion of primary series, a booster dose should be given every 10 years to maintain immunity against tetanus. Td may also be given as tetanus wound prophylaxis.   Tdap Vaccine - COST NOT COVERED BY MEDICARE PART B Recommended at least once for all adults. For pregnant patients, recommended with each pregnancy.   Shingles Vaccine (Shingrix) - COST NOT COVERED BY MEDICARE PART B  2 shot series recommended in those 19 years and older who have or will have weakened immune systems or those 50 years and older     Health Maintenance Due:  There are no preventive care reminders to display for this patient.  Immunizations Due:      Topic Date Due   • COVID-19 Vaccine (4 - 2024-25 season) 09/01/2024     Advance Directives   What are advance directives?  Advance directives are legal documents that state your wishes and plans for medical care. These plans are made ahead of time in case you lose your ability to make decisions for yourself. Advance directives can apply to any medical decision, such as the treatments you want, and if you want to donate organs.   What are the types of advance directives?  There are many types of advance directives, and each state has rules about how to use them. You may choose a combination of any of the  following:  Living will:  This is a written record of the treatment you want. You can also choose which treatments you do not want, which to limit, and which to stop at a certain time. This includes surgery, medicine, IV fluid, and tube feedings.   Durable power of  for healthcare (DPAHC):  This is a written record that states who you want to make healthcare choices for you when you are unable to make them for yourself. This person, called a proxy, is usually a family member or a friend. You may choose more than 1 proxy.  Do not resuscitate (DNR) order:  A DNR order is used in case your heart stops beating or you stop breathing. It is a request not to have certain forms of treatment, such as CPR. A DNR order may be included in other types of advance directives.  Medical directive:  This covers the care that you want if you are in a coma, near death, or unable to make decisions for yourself. You can list the treatments you want for each condition. Treatment may include pain medicine, surgery, blood transfusions, dialysis, IV or tube feedings, and a ventilator (breathing machine).  Values history:  This document has questions about your views, beliefs, and how you feel and think about life. This information can help others choose the care that you would choose.  Why are advance directives important?  An advance directive helps you control your care. Although spoken wishes may be used, it is better to have your wishes written down. Spoken wishes can be misunderstood, or not followed. Treatments may be given even if you do not want them. An advance directive may make it easier for your family to make difficult choices about your care.   Depression   Depression  is a medical condition that causes feelings of sadness or hopelessness that do not go away. Depression may cause you to lose interest in things you used to enjoy. These feelings may interfere with your daily life.  Call your local emergency number (620 in  the ) if:   You think about harming yourself or someone else.  You have done something on purpose to hurt yourself.  The following resources are available at any time to help you, if needed:   National Suicide Prevention Lifeline: 1-508.464.1113 (6-301-467-TALK)   Suicide Hotline: 1-569.930.8613 (8-946-DPIFUHF)   For a list of international numbers: https://save.org/find-help/international-resources/  Treatment for depression may include medicine to relieve depression. Medicine is often used together with therapy. Therapy is a way for you to talk about your feelings and anything that may be causing depression. Therapy can be done alone or in a group. It may also be done with family members or a significant other.  Get regular physical activity.    Create a regular sleep schedule.    Eat a variety of healthy foods.    Do not drink alcohol or use drugs.     Fall Prevention    Fall prevention  includes ways to make your home and other areas safer. It also includes ways you can move more carefully to prevent a fall. Health conditions that cause changes in your blood pressure, vision, or muscle strength and coordination may increase your risk for falls. Medicines may also increase your risk for falls if they make you dizzy, weak, or sleepy.   Fall prevention tips:   Stand or sit up slowly.    Use assistive devices as directed.    Wear shoes that fit well and have soles that .    Wear a personal alarm.    Stay active.    Manage your medical conditions.    Home Safety Tips:  Add items to prevent falls in the bathroom.    Keep paths clear.    Install bright lights in your home.    Keep items you use often on shelves within reach.    Paint or place reflective tape on the edges of your stairs.    Weight Management   Why it is important to manage your weight:  Being overweight increases your risk of health conditions such as heart disease, high blood pressure, type 2 diabetes, and certain types of cancer. It can also  increase your risk for osteoarthritis, sleep apnea, and other respiratory problems. Aim for a slow, steady weight loss. Even a small amount of weight loss can lower your risk of health problems.  How to lose weight safely:  A safe and healthy way to lose weight is to eat fewer calories and get regular exercise. You can lose up about 1 pound a week by decreasing the number of calories you eat by 500 calories each day.   Healthy meal plan for weight management:  A healthy meal plan includes a variety of foods, contains fewer calories, and helps you stay healthy. A healthy meal plan includes the following:  Eat whole-grain foods more often.  A healthy meal plan should contain fiber. Fiber is the part of grains, fruits, and vegetables that is not broken down by your body. Whole-grain foods are healthy and provide extra fiber in your diet. Some examples of whole-grain foods are whole-wheat breads and pastas, oatmeal, brown rice, and bulgur.  Eat a variety of vegetables every day.  Include dark, leafy greens such as spinach, kale, nazario greens, and mustard greens. Eat yellow and orange vegetables such as carrots, sweet potatoes, and winter squash.   Eat a variety of fruits every day.  Choose fresh or canned fruit (canned in its own juice or light syrup) instead of juice. Fruit juice has very little or no fiber.  Eat low-fat dairy foods.  Drink fat-free (skim) milk or 1% milk. Eat fat-free yogurt and low-fat cottage cheese. Try low-fat cheeses such as mozzarella and other reduced-fat cheeses.  Choose meat and other protein foods that are low in fat.  Choose beans or other legumes such as split peas or lentils. Choose fish, skinless poultry (chicken or turkey), or lean cuts of red meat (beef or pork). Before you cook meat or poultry, cut off any visible fat.   Use less fat and oil.  Try baking foods instead of frying them. Add less fat, such as margarine, sour cream, regular salad dressing and mayonnaise to foods. Eat  "fewer high-fat foods. Some examples of high-fat foods include french fries, doughnuts, ice cream, and cakes.  Eat fewer sweets.  Limit foods and drinks that are high in sugar. This includes candy, cookies, regular soda, and sweetened drinks.  Exercise:  Exercise at least 30 minutes per day on most days of the week. Some examples of exercise include walking, biking, dancing, and swimming. You can also fit in more physical activity by taking the stairs instead of the elevator or parking farther away from stores. Ask your healthcare provider about the best exercise plan for you.   Alcohol Use and Your Health    Drinking too much can harm your health.  Excessive alcohol use leads to about 88,000 death in the [place] each year, and shortens the life of those who diet by almost 30 years.  Further, excessive drinking cost the economy $249 billion in 2010.  Most excessive drinkers are not alcohol dependent.    Excessive alcohol use has immediate effects that increase the risk of many harmful health conditions.  These are most often the result of binge drinking.  Over time, excessive alcohol use can lead to the development of chronic diseases and other series health problems.    What is considered a \"drink\"?        Excessive alcohol use includes:  Binge Drinking: For women, 4 or more drinks consumed on one occasion. For men, 5 or more drinks consumed on one occasion.  Heavy Drinking: For women, 8 or more drinks per week. For men, 15 or more drinks per week  Any alcohol used by pregnant women  Any alcohol used by those under the age of 21 years    If you choose to drink, do so in moderation:  Do not drink at all if you are under the age of 21, or if you are or may be pregnant, or have health problems that could be made worse by drinking.  For women, up to 1 drink per day  For men, up to 2 drinks a day    No one should begin drinking or drink more frequently based on potential health benefits    Short-Term Health " Risks:  Injuries: motor vehicle crashes, falls, drownings, burns  Violence: homicide, suicide, sexual assault, intimate partner violence  Alcohol poisoning  Reproductive health: risky sexual behaviors, unintended prengnacy, sexually transmitted diseases, miscarriage, stillbirth, fetal alcohol syndrome    Long-Term Health Risks:  Chronic diseases: high blood pressure, heart disease, stroke, liver disease, digestive problems  Cancers: breast, mouth and throat, liver, colon  Learning and memory problems: dementia, poor school performance  Mental health: depression, anxiety, insomnia  Social problems: lost productivity, family problems, unemployment  Alcohol dependence    For support and more information:  Substance Abuse and Mental Health Services Administration  [address]  [place] [address]  Web Address: http://www.samhsa.gov    Alcoholics Anonymous        Web Address: http://www.aa.org    https://www.cdc.gov/alcohol/fact-sheets/alcohol-use.htm     © Copyright [employer] 2018 Information is for End User's use only and may not be sold, redistributed or otherwise used for commercial purposes. All illustrations and images included in CareNotes® are the copyrighted property of [employer] or [employer]

## 2025-03-31 NOTE — PROGRESS NOTES
Name: Norman Walsh      : 1935      MRN: [id number]  Encounter Provider: Benjamín Montaño DO  Encounter Date: 3/31/2025   Encounter department: [facility]    Assessment & Plan  Medicare annual wellness visit, subsequent  Completed       Anemia due to vitamin B12 deficiency, unspecified B12 deficiency type  Follow up on b12 level  Orders:  •  Vitamin B12; Future    Hyperlipidemia, unspecified hyperlipidemia type  Labs ordered for   Orders:  •  Comprehensive metabolic panel; Future  •  Lipid Panel with Direct LDL reflex; Future    Benign essential hypertension  Follow up on labs   BP hypertensive and no change at this time  Orders:  •  Comprehensive metabolic panel; Future  •  Lipid Panel with Direct LDL reflex; Future  •  CBC and differential; Future       Preventive health issues were discussed with patient, and age appropriate screening tests were ordered as noted in patient's After Visit Summary. Personalized health advice and appropriate referrals for health education or preventive services given if needed, as noted in patient's After Visit Summary.    History of Present Illness     HPI   Patient Care Team:  Benjamín Montaño DO as PCP - General (Internal Medicine)  Bart Barry MD (Cardiology)  David Patel MD (Urology)  Dmitry Soriano MD (Urology)    Review of Systems   Constitutional:  Negative for chills and fever.   HENT:  Negative for ear pain and sore throat.    Eyes:  Negative for pain and visual disturbance.   Respiratory:  Negative for cough and shortness of breath.    Cardiovascular:  Negative for chest pain and palpitations.   Gastrointestinal:  Negative for abdominal pain and vomiting.   Genitourinary:  Negative for dysuria and hematuria.   Musculoskeletal:  Negative for arthralgias and back pain.   Skin:  Negative for color change and rash.   Neurological:  Negative for seizures and syncope.   All other systems reviewed and are negative.    Medical History Reviewed by  provider this encounter:       Annual Wellness Visit Questionnaire   Norman is here for his Subsequent Wellness visit. Last Medicare Wellness visit information reviewed, patient interviewed, no change since last AWV.     Health Risk Assessment:   Patient rates overall health as good. Patient feels that their physical health rating is slightly worse. Patient is dissatisfied with their life. Eyesight was rated as same. Hearing was rated as slightly worse. Patient feels that their emotional and mental health rating is slightly worse. Patients states they are sometimes angry. Patient states they are often unusually tired/fatigued. Pain experienced in the last 7 days has been some. Patient's pain rating has been 5/10. Patient states that he has experienced no weight loss or gain in last 6 months.     Depression Screening:   PHQ-2 Score: 3  PHQ-9 Score: 10      Fall Risk Screening:   In the past year, patient has experienced: history of falling in past year    Number of falls: 1  Injured during fall?: No    Feels unsteady when standing or walking?: No    Worried about falling?: Yes      Home Safety:  Patient does not have trouble with stairs inside or outside of their home. Patient has working smoke alarms and has working carbon monoxide detector. Home safety hazards include: none.     Nutrition:   Current diet is Regular.     Medications:   Patient is currently taking over-the-counter supplements. OTC medications include: see medication list. Patient is not able to manage medications. fill case with assistance, take medications when directed    Activities of Daily Living (ADLs)/Instrumental Activities of Daily Living (IADLs):   Walk and transfer into and out of bed and chair?: Yes  Dress and groom yourself?: Yes    Bathe or shower yourself?: Yes    Feed yourself? Yes  Do your laundry/housekeeping?: No  Manage your money, pay your bills and track your expenses?: No  Make your own meals?: No    Do your own shopping?:  No    ADL comments: wife takes care of above    Durable Medical Equipment Suppliers  n/a    Previous Hospitalizations:   Any hospitalizations or ED visits within the last 12 months?: Yes    How many hospitalizations have you had in the last year?: 1-2    Hospitalization Comments: 1 emergency visit    Advance Care Planning:   Living will: Yes    Durable POA for healthcare: Yes    Advanced directive: Yes    Advanced directive counseling given: No    Five wishes given: No    Patient declined ACP directive: No    End of Life Decisions reviewed with patient: Yes    Provider agrees with end of life decisions: Yes      Cognitive Screening:   Provider or family/friend/caregiver concerned regarding cognition?: Yes    PREVENTIVE SCREENINGS      Cardiovascular Screening:    General: Screening Not Indicated and History Lipid Disorder      Diabetes Screening:     General: Screening Current      Colorectal Cancer Screening:     General: Screening Not Indicated      Prostate Cancer Screening:    General: History Prostate Cancer and Screening Not Indicated      Osteoporosis Screening:    General: Screening Not Indicated      Abdominal Aortic Aneurysm (AAA) Screening:        General: Screening Not Indicated      Lung Cancer Screening:     General: Screening Not Indicated      Hepatitis C Screening:    General: Patient Declines    Screening, Brief Intervention, and Referral to Treatment (SBIRT)     Screening  Typical number of drinks in a day: 1  Typical number of drinks in a week: 5  Interpretation: Low risk drinking behavior.    AUDIT-C Screenin) How often did you have a drink containing alcohol in the past year? 4 or more times a week  2) How many drinks did you have on a typical day when you were drinking in the past year? 1 to 2  3) How often did you have 6 or more drinks on one occasion in the past year? never    AUDIT-C Score: 4  Interpretation: Score 4-12 (male): POSITIVE screen for alcohol misuse    AUDIT Screenin)  How often during the last year have you found that you were not able to stop drinking once you had started? 0 - never  5) How often during the last year have you failed to do what was normally expected from you because of drinking? 0 - never  6) How often during the last year have you needed a first drink in the morning to get yourself going after a heavy drinking session? 0 - never  7) How often during the last year have you had a feeling of guilt or remorse after drinking? 0 - never  8) How often during the last year have you been unable to remember what happened the night before because you had been drinking? 0 - never  9) Have you or someone else been injured as a result of your drinking? 0 - no  10) Has a relative or friend or a doctor or another health worker been concerned about your drinking or suggested you cut down? 0 - no    AUDIT Score: 4  Interpretation: Low risk alcohol consumption    Single Item Drug Screening:  How often have you used an illegal drug (including marijuana) or a prescription medication for non-medical reasons in the past year? never    Single Item Drug Screen Score: 0  Interpretation: Negative screen for possible drug use disorder    Social Drivers of Health     Financial Resource Strain: Low Risk  (3/22/2023)    Overall Financial Resource Strain (CARDIA)    • Difficulty of Paying Living Expenses: Not hard at all   Food Insecurity: No Food Insecurity (3/31/2025)    Hunger Vital Sign    • Worried About Running Out of Food in the Last Year: Never true    • Ran Out of Food in the Last Year: Never true   Transportation Needs: No Transportation Needs (3/31/2025)    PRAPARE - Transportation    • Lack of Transportation (Medical): No    • Lack of Transportation (Non-Medical): No   Housing Stability: Low Risk  (3/31/2025)    Housing Stability Vital Sign    • Unable to Pay for Housing in the Last Year: No    • Number of Times Moved in the Last Year: 0    • Homeless in the Last Year: No  "  Utilities: Not At Risk (3/31/2025)    [facility] Utilities    • Threatened with loss of utilities: No     No results found.    Objective   /74 (Patient Position: Sitting, Cuff Size: Large)   Pulse 85   Temp (!) 96.8 °F (36 °C) (Tympanic)   Ht 5' 10\" (1.778 m)   Wt 86.6 kg (191 lb)   SpO2 95%   BMI 27.41 kg/m²     Physical Exam  Vitals and nursing note reviewed.   Constitutional:       General: He is not in acute distress.     Appearance: He is well-developed.   HENT:      Head: Normocephalic and atraumatic.   Eyes:      Conjunctiva/sclera: Conjunctivae normal.   Cardiovascular:      Rate and Rhythm: Normal rate and regular rhythm.      Heart sounds: No murmur heard.  Pulmonary:      Effort: Pulmonary effort is normal. No respiratory distress.      Breath sounds: Normal breath sounds.   Abdominal:      Palpations: Abdomen is soft.      Tenderness: There is no abdominal tenderness.   Musculoskeletal:         General: No swelling.      Cervical back: Neck supple.   Skin:     General: Skin is warm and dry.      Capillary Refill: Capillary refill takes less than 2 seconds.   Neurological:      Mental Status: He is alert.   Psychiatric:         Mood and Affect: Mood normal.         "

## 2025-04-03 ENCOUNTER — CONSULT (OUTPATIENT)
Dept: GASTROENTEROLOGY | Facility: CLINIC | Age: 89
End: 2025-04-03
Payer: MEDICARE

## 2025-04-03 ENCOUNTER — APPOINTMENT (OUTPATIENT)
Dept: LAB | Facility: CLINIC | Age: 89
End: 2025-04-03

## 2025-04-03 VITALS
TEMPERATURE: 97.3 F | WEIGHT: 190 LBS | DIASTOLIC BLOOD PRESSURE: 78 MMHG | SYSTOLIC BLOOD PRESSURE: 130 MMHG | HEIGHT: 70 IN | BODY MASS INDEX: 27.2 KG/M2 | OXYGEN SATURATION: 97 % | HEART RATE: 59 BPM | RESPIRATION RATE: 16 BRPM

## 2025-04-03 DIAGNOSIS — R10.10 PAIN OF UPPER ABDOMEN: Primary | ICD-10-CM

## 2025-04-03 DIAGNOSIS — R14.2 ERUCTATION: ICD-10-CM

## 2025-04-03 DIAGNOSIS — K59.09 INTERMITTENT CONSTIPATION: ICD-10-CM

## 2025-04-03 PROCEDURE — 99203 OFFICE O/P NEW LOW 30 MIN: CPT | Performed by: PHYSICIAN ASSISTANT

## 2025-04-03 RX ORDER — FAMOTIDINE 40 MG/1
40 TABLET, FILM COATED ORAL DAILY
Qty: 30 TABLET | Refills: 3 | Status: SHIPPED | OUTPATIENT
Start: 2025-04-03

## 2025-04-03 NOTE — PATIENT INSTRUCTIONS
Start the famotidine/Pepcid tablet every morning.  Start a little bit of MiraLAX on a scheduled basis.  People can take anywhere from one half of a capful every other day up to 2 capfuls daily if they need it.  In general aim for excellent hydration and moving your body which helps to move the bowels.    We will start with a noninvasive workup with a test called an upper GI test and a blood test to look for a bacteria called H. pylori.    If you develop any vomiting that does not stop, trouble swallowing, weight loss, or bright red or black diarrhea, get seen immediately.

## 2025-04-03 NOTE — PROGRESS NOTES
Name: Norman Walsh      : 1935      MRN: [id number]  Encounter Provider: Kayley Singer PA-C  Encounter Date: 4/3/2025   [facility]  :  Assessment & Plan  Pain of upper abdomen  Pt with dementia here today with complaints of upper abd pain (localized to the LUQ) and some excess belch/hiccup at times.   No alarm features to GI tract at present.   CT A/P (albeit WO contrast) with no sig pathology identified.   Unclear etiol, ddx includes gastritis, PUD, dysmotility, SIBO, IBS, underlying stool burden, etc.     Recommend noninvasive testing given patient's progressive dementia and age.  Therefore we will start with an upper GI series.  Will check an H. pylori stool test.  As I suspect there may be some underlying peptic inflammation, will trial patient on daily famotidine.  Will also make sure he is evacuating stool completely.    Alarm features to include abnormal weight loss, dysphagia, intractable n/v, rectal bleeding, or new onset of anemia were reviewed with pt and wife today and if these develop, would then recommend considering endoscopic evaluation pending pt and family's goals of care.  Orders:    Ambulatory Referral to Gastroenterology    FL UGI/sm bowel; Future    H. pylori antigen, stool; Future    famotidine (PEPCID) 40 MG tablet; Take 1 tablet (40 mg total) by mouth daily    Tissue Transglutaminase, IgA; Future    IgA; Future    TSH w/Reflex; Future    Eructation  Noted, check UGI series and h pylori stool testing.   Avoid excess air swallowing, mints/gum/lozenges.   Okay for gas-x, beano.   Update blood work and check celiac serologies.   Consider SIBO testing in future if test would be able to be completed.        Intermittent constipation  Some scattered stool noted on pt's CT scan.   Recommend initiation of scheduled miralax to make sure he is completely evacuating his stools.  He can continue with daily prunes as well.   No  alarm features to the lower GI tract that would warrant colonoscopy at this time.          We will follow up with pt in 3 months to reassess symptoms.     History of Present Illness   HPI  Norman Walsh is a 89 y.o. male who presents for evaluation of abd pain. Pmhx sig for atrial fib on Eliquis, HTN, HLD, dementia, BPH, prostate ca.      History obtained from: patient    Patient is new to clinic.  He is here today with his wife who provides history as he has dementia.  She shares that for about the past 4 months or so he puts his hand to his upper abdomen following eating and at times has a belching or hiccups.  It is now occurring daily.  Thinks related to oral intake.  No complaints of heartburn, regurgitation, dysphagia.  Wife has not noticed any episodes of emesis or choking with oral intake.  No abnormal weight loss over the past 6 months. No excess carbonation, excess straw usage, does eat mints and gum at times.     Pertaining to bowels, pt having a BM just about daily. No excess straining. No BRBPR or melena. No nocturnal BM endorsed. No fecal incontinence. No family hx of CRC. Takes prunes daily.     NSAIDs: none  Etoh: none  Tobacco: none    02/2025: Hb 15.1, MCV 96, Plt 217, B12 237  02/2025: CT A/P: Mild bladder wall thickening may be due to chronic bladder outlet obstruction or cystitis. Punctate nonobstructing left renal calculus. No hydronephrosis or obstructive uropathy.  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 He has never been exposed to tobacco smoke. He has never used smokeless tobacco. He reports current alcohol use of about 3.0 standard drinks of alcohol per week. He reports that he does not use drugs.  Current Outpatient Medications   Medication Instructions    amLODIPine (NORVASC) 5 mg, Daily    Calcium Carbonate-Vitamin D 600-400 MG-UNIT per tablet 1 tablet, Daily    donepezil (ARICEPT) 10 mg, Oral, Daily at bedtime    Eliquis 5 mg, 2 times daily    escitalopram (LEXAPRO) 5 mg tablet Daily    metoprolol tartrate (LOPRESSOR) 25 mg, Oral, Every 12 hours    Multiple Vitamins-Minerals (CENTRUM ADULTS PO) 1 tablet, Daily    Omega-3 1000 MG CAPS 1 capsule, 2 times daily    ramipril (ALTACE) 10 MG capsule Daily    simvastatin (ZOCOR) 40 mg, Oral, Daily    tamsulosin (FLOMAX) 0.4 mg    traZODone (DESYREL) 50 mg, Daily at bedtime   No Known Allergies      Objective   There were no vitals taken for this visit.     Physical Exam  Vitals and nursing note reviewed.   Constitutional:       General: He is not in acute distress.     Appearance: He is well-developed.   HENT:      Head: Normocephalic and atraumatic.   Eyes:      General: No scleral icterus.     Conjunctiva/sclera: Conjunctivae normal.   Cardiovascular:      Rate and Rhythm: Normal rate.      Heart sounds: No murmur heard.  Pulmonary:      Effort: Pulmonary effort is normal. No respiratory distress.   Abdominal:      General: Bowel sounds are normal. There is no distension.      Palpations: Abdomen is soft.      Tenderness: There is no abdominal tenderness. There is no guarding or rebound.   Skin:     General: Skin is  warm and dry.      Coloration: Skin is not jaundiced.   Neurological:      Mental Status: He is alert. Mental status is at baseline. He is disoriented.   Psychiatric:         Mood and Affect: Mood normal.         Behavior: Behavior normal.       **Please note:  Dictation voice to text software may have been used in the creation of this record.  Occasional wrong word or “sound alike” substitutions may have occurred due to the inherent limitations of voice recognition software.  Read the chart carefully and recognize, using context, where substitutions have occurred.**

## 2025-04-07 DIAGNOSIS — I48.0 PAROXYSMAL ATRIAL FIBRILLATION (HCC): ICD-10-CM

## 2025-04-08 RX ORDER — METOPROLOL TARTRATE 25 MG/1
25 TABLET, FILM COATED ORAL 2 TIMES DAILY
Qty: 180 TABLET | Refills: 1 | Status: SHIPPED | OUTPATIENT
Start: 2025-04-08

## 2025-04-11 ENCOUNTER — RESULTS FOLLOW-UP (OUTPATIENT)
Dept: GASTROENTEROLOGY | Facility: CLINIC | Age: 89
End: 2025-04-11

## 2025-04-11 ENCOUNTER — HOSPITAL ENCOUNTER (OUTPATIENT)
Dept: RADIOLOGY | Facility: HOSPITAL | Age: 89
End: 2025-04-11
Payer: MEDICARE

## 2025-04-11 DIAGNOSIS — R10.10 PAIN OF UPPER ABDOMEN: ICD-10-CM

## 2025-04-11 PROCEDURE — 74248 X-RAY SM INT F-THRU STD: CPT

## 2025-04-11 PROCEDURE — 74240 X-RAY XM UPR GI TRC 1CNTRST: CPT

## 2025-04-14 ENCOUNTER — APPOINTMENT (OUTPATIENT)
Dept: LAB | Facility: CLINIC | Age: 89
End: 2025-04-14
Payer: MEDICARE

## 2025-04-14 DIAGNOSIS — R10.10 PAIN OF UPPER ABDOMEN: ICD-10-CM

## 2025-04-14 PROCEDURE — 87338 HPYLORI STOOL AG IA: CPT

## 2025-04-16 LAB — H PYLORI AG STL QL IA: POSITIVE

## 2025-04-25 DIAGNOSIS — R10.10 PAIN OF UPPER ABDOMEN: ICD-10-CM

## 2025-04-25 RX ORDER — FAMOTIDINE 40 MG/1
40 TABLET, FILM COATED ORAL DAILY
Qty: 100 TABLET | Refills: 1 | Status: SHIPPED | OUTPATIENT
Start: 2025-04-25

## 2025-05-02 ENCOUNTER — APPOINTMENT (OUTPATIENT)
Dept: RADIOLOGY | Facility: CLINIC | Age: 89
End: 2025-05-02
Attending: INTERNAL MEDICINE
Payer: MEDICARE

## 2025-05-02 ENCOUNTER — APPOINTMENT (OUTPATIENT)
Dept: LAB | Facility: CLINIC | Age: 89
End: 2025-05-02
Payer: MEDICARE

## 2025-05-02 ENCOUNTER — OFFICE VISIT (OUTPATIENT)
Dept: INTERNAL MEDICINE CLINIC | Facility: CLINIC | Age: 89
End: 2025-05-02
Payer: MEDICARE

## 2025-05-02 VITALS
WEIGHT: 185 LBS | HEART RATE: 57 BPM | DIASTOLIC BLOOD PRESSURE: 68 MMHG | BODY MASS INDEX: 26.48 KG/M2 | SYSTOLIC BLOOD PRESSURE: 124 MMHG | HEIGHT: 70 IN | OXYGEN SATURATION: 97 %

## 2025-05-02 DIAGNOSIS — R10.10 PAIN OF UPPER ABDOMEN: ICD-10-CM

## 2025-05-02 DIAGNOSIS — D51.9 ANEMIA DUE TO VITAMIN B12 DEFICIENCY, UNSPECIFIED B12 DEFICIENCY TYPE: ICD-10-CM

## 2025-05-02 DIAGNOSIS — J18.9 PNEUMONIA OF BOTH LOWER LOBES DUE TO INFECTIOUS ORGANISM: ICD-10-CM

## 2025-05-02 DIAGNOSIS — E56.9 VITAMIN DEFICIENCY: ICD-10-CM

## 2025-05-02 DIAGNOSIS — J18.9 PNEUMONIA OF BOTH LOWER LOBES DUE TO INFECTIOUS ORGANISM: Primary | ICD-10-CM

## 2025-05-02 LAB
ALBUMIN SERPL BCG-MCNC: 3.8 G/DL (ref 3.5–5)
ALP SERPL-CCNC: 52 U/L (ref 34–104)
ALT SERPL W P-5'-P-CCNC: 16 U/L (ref 7–52)
ANION GAP SERPL CALCULATED.3IONS-SCNC: 7 MMOL/L (ref 4–13)
AST SERPL W P-5'-P-CCNC: 17 U/L (ref 13–39)
BASOPHILS # BLD AUTO: 0.06 THOUSANDS/ÂΜL (ref 0–0.1)
BASOPHILS NFR BLD AUTO: 1 % (ref 0–1)
BILIRUB SERPL-MCNC: 0.47 MG/DL (ref 0.2–1)
BUN SERPL-MCNC: 11 MG/DL (ref 5–25)
CALCIUM SERPL-MCNC: 9.5 MG/DL (ref 8.4–10.2)
CHLORIDE SERPL-SCNC: 104 MMOL/L (ref 96–108)
CO2 SERPL-SCNC: 31 MMOL/L (ref 21–32)
CREAT SERPL-MCNC: 0.98 MG/DL (ref 0.6–1.3)
EOSINOPHIL # BLD AUTO: 0.2 THOUSAND/ÂΜL (ref 0–0.61)
EOSINOPHIL NFR BLD AUTO: 3 % (ref 0–6)
ERYTHROCYTE [DISTWIDTH] IN BLOOD BY AUTOMATED COUNT: 12.9 % (ref 11.6–15.1)
GFR SERPL CREATININE-BSD FRML MDRD: 69 ML/MIN/1.73SQ M
GLUCOSE SERPL-MCNC: 96 MG/DL (ref 65–140)
HCT VFR BLD AUTO: 43.1 % (ref 36.5–49.3)
HGB BLD-MCNC: 14.4 G/DL (ref 12–17)
IGA SERPL-MCNC: 280 MG/DL (ref 66–433)
IMM GRANULOCYTES # BLD AUTO: 0.03 THOUSAND/UL (ref 0–0.2)
IMM GRANULOCYTES NFR BLD AUTO: 0 % (ref 0–2)
LYMPHOCYTES # BLD AUTO: 1.42 THOUSANDS/ÂΜL (ref 0.6–4.47)
LYMPHOCYTES NFR BLD AUTO: 19 % (ref 14–44)
MAGNESIUM SERPL-MCNC: 2.4 MG/DL (ref 1.9–2.7)
MCH RBC QN AUTO: 32.2 PG (ref 26.8–34.3)
MCHC RBC AUTO-ENTMCNC: 33.4 G/DL (ref 31.4–37.4)
MCV RBC AUTO: 96 FL (ref 82–98)
MONOCYTES # BLD AUTO: 0.56 THOUSAND/ÂΜL (ref 0.17–1.22)
MONOCYTES NFR BLD AUTO: 7 % (ref 4–12)
NEUTROPHILS # BLD AUTO: 5.41 THOUSANDS/ÂΜL (ref 1.85–7.62)
NEUTS SEG NFR BLD AUTO: 70 % (ref 43–75)
NRBC BLD AUTO-RTO: 0 /100 WBCS
PLATELET # BLD AUTO: 325 THOUSANDS/UL (ref 149–390)
PMV BLD AUTO: 9.4 FL (ref 8.9–12.7)
POTASSIUM SERPL-SCNC: 4.5 MMOL/L (ref 3.5–5.3)
PROT SERPL-MCNC: 7.2 G/DL (ref 6.4–8.4)
RBC # BLD AUTO: 4.47 MILLION/UL (ref 3.88–5.62)
SODIUM SERPL-SCNC: 142 MMOL/L (ref 135–147)
TSH SERPL DL<=0.05 MIU/L-ACNC: 4.01 UIU/ML (ref 0.45–4.5)
VIT B12 SERPL-MCNC: 757 PG/ML (ref 180–914)
WBC # BLD AUTO: 7.68 THOUSAND/UL (ref 4.31–10.16)

## 2025-05-02 PROCEDURE — 85025 COMPLETE CBC W/AUTO DIFF WBC: CPT

## 2025-05-02 PROCEDURE — 86364 TISS TRNSGLTMNASE EA IG CLAS: CPT

## 2025-05-02 PROCEDURE — 82607 VITAMIN B-12: CPT

## 2025-05-02 PROCEDURE — 80053 COMPREHEN METABOLIC PANEL: CPT

## 2025-05-02 PROCEDURE — 71046 X-RAY EXAM CHEST 2 VIEWS: CPT

## 2025-05-02 PROCEDURE — 84443 ASSAY THYROID STIM HORMONE: CPT

## 2025-05-02 PROCEDURE — 99495 TRANSJ CARE MGMT MOD F2F 14D: CPT | Performed by: INTERNAL MEDICINE

## 2025-05-02 PROCEDURE — 36415 COLL VENOUS BLD VENIPUNCTURE: CPT

## 2025-05-02 PROCEDURE — 82784 ASSAY IGA/IGD/IGG/IGM EACH: CPT

## 2025-05-02 PROCEDURE — 83735 ASSAY OF MAGNESIUM: CPT

## 2025-05-02 NOTE — PROGRESS NOTES
"Transition of Care Visit:  Name: Norman Walsh      : 1935      MRN: [id number]  Encounter Provider: Benjamín Montaño DO  Encounter Date: 2025   Encounter department: [facility]    Assessment & Plan  Pneumonia of both lower lobes due to infectious organism    Orders:  •  XR chest pa and lateral; Future  •  Comprehensive metabolic panel; Future  •  CBC and differential; Future  •  Magnesium; Future         History of Present Illness     Transitional Care Management Review:   Norman Walsh is a 89 y.o. male here for TCM follow up.     During the TCM phone call patient stated:  TCM Call (since 2025)     None      TCM Call (since 2025)     None        HPI  Review of Systems  Objective   /68   Pulse 57   Ht 5' 10\" (1.778 m)   Wt 83.9 kg (185 lb)   SpO2 97%   BMI 26.54 kg/m²     Physical Exam  Medications have been reviewed by provider in current encounter    Administrative Statements   I have spent a total time of 30 minutes in caring for this patient on the day of the visit/encounter including Diagnostic results.  " Discharged

## 2025-05-02 NOTE — PROGRESS NOTES
"Transition of Care Visit:  Name: Norman Walsh      : 1935      MRN: [id number]  Encounter Provider: Benjamín Montaño DO  Encounter Date: 2025   Encounter department: [facility]    Assessment & Plan  Pneumonia of both lower lobes due to infectious organism  The patient was noted to have a syncopal episode.  The patient went to the ER and was subsequently discharged to home.  He continue to be somnolent and was taken to [facility] and admitted for pneumonia.  Sputum was negative.  Lactic Acid and procalcitonin were also negative.  The patient WBC were always normal.  The patient was discharged to home with the diagnosis of viral pneumonia.  Follow up for TCM today  Orders:  •  XR chest pa and lateral; Future  •  Comprehensive metabolic panel; Future  •  CBC and differential; Future  •  Magnesium; Future         History of Present Illness     Transitional Care Management Review:   Norman Walsh is a 89 y.o. male here for TCM follow up.     During the TCM phone call patient stated:  TCM Call (since 2025)     None      TCM Call (since 2025)     None        HPI  Review of Systems   Constitutional:  Negative for chills, fatigue and fever.   HENT: Negative.     Respiratory:  Negative for cough, chest tightness and shortness of breath.    Cardiovascular:  Negative for chest pain and palpitations.   Gastrointestinal:  Negative for abdominal pain, constipation, diarrhea, nausea and vomiting.   Genitourinary: Negative.    Musculoskeletal:  Negative for back pain and myalgias.   Skin: Negative.    Neurological: Negative.    Psychiatric/Behavioral:  Negative for dysphoric mood. The patient is not nervous/anxious.      Objective   /68   Pulse 57   Ht 5' 10\" (1.778 m)   Wt 83.9 kg (185 lb)   SpO2 97%   BMI 26.54 kg/m²     Physical Exam  Vitals and nursing note reviewed.   Constitutional:       General: He is not in acute distress.     Appearance: He is well-developed.   HENT:     "  Head: Normocephalic and atraumatic.   Eyes:      Conjunctiva/sclera: Conjunctivae normal.   Cardiovascular:      Rate and Rhythm: Normal rate and regular rhythm.      Heart sounds: No murmur heard.  Pulmonary:      Effort: Pulmonary effort is normal. No respiratory distress.      Breath sounds: Normal breath sounds.   Abdominal:      Palpations: Abdomen is soft.      Tenderness: There is no abdominal tenderness.   Musculoskeletal:         General: No swelling.      Cervical back: Neck supple.   Skin:     General: Skin is warm and dry.      Capillary Refill: Capillary refill takes less than 2 seconds.   Neurological:      Mental Status: He is alert.   Psychiatric:         Mood and Affect: Mood normal.       Medications have been reviewed by provider in current encounter

## 2025-05-04 LAB — TTG IGA SER IA-ACNC: 0.4 U/ML (ref ?–10)

## 2025-05-05 ENCOUNTER — RESULTS FOLLOW-UP (OUTPATIENT)
Dept: INTERNAL MEDICINE CLINIC | Facility: CLINIC | Age: 89
End: 2025-05-05

## 2025-05-05 ENCOUNTER — TELEPHONE (OUTPATIENT)
Age: 89
End: 2025-05-05

## 2025-05-13 ENCOUNTER — TELEPHONE (OUTPATIENT)
Age: 89
End: 2025-05-13

## 2025-05-13 NOTE — TELEPHONE ENCOUNTER
Patients spouse, Rika, called to ask Dr. Montaño's opinion.  Patient's stool sample came back with H-pylory bacteria.  The person who took the bacteria sample is recommending a 14 day course of medication.  This sample was taken back in April prior to hospitalization.     Rika would like to know if she should put patient on the 14 day course of medication or do another stool sample?  If provider recommend another stool sample, would he  write the script.    Please advise and notify.    Thank you.    Rika: [phone]

## 2025-05-13 NOTE — TELEPHONE ENCOUNTER
Please follow up here to discuss the recent labs ordered by GI.  We will review and go over treatment options

## 2025-05-14 ENCOUNTER — OFFICE VISIT (OUTPATIENT)
Dept: INTERNAL MEDICINE CLINIC | Facility: CLINIC | Age: 89
End: 2025-05-14
Payer: MEDICARE

## 2025-05-14 VITALS
SYSTOLIC BLOOD PRESSURE: 130 MMHG | OXYGEN SATURATION: 97 % | BODY MASS INDEX: 27.2 KG/M2 | TEMPERATURE: 96.5 F | HEIGHT: 70 IN | DIASTOLIC BLOOD PRESSURE: 82 MMHG | HEART RATE: 65 BPM | WEIGHT: 190 LBS

## 2025-05-14 DIAGNOSIS — A04.8 H. PYLORI INFECTION: Primary | ICD-10-CM

## 2025-05-14 PROCEDURE — G2211 COMPLEX E/M VISIT ADD ON: HCPCS | Performed by: INTERNAL MEDICINE

## 2025-05-14 PROCEDURE — 99214 OFFICE O/P EST MOD 30 MIN: CPT | Performed by: INTERNAL MEDICINE

## 2025-05-14 RX ORDER — METRONIDAZOLE 500 MG/1
500 TABLET ORAL EVERY 8 HOURS SCHEDULED
Qty: 42 TABLET | Refills: 0 | Status: SHIPPED | OUTPATIENT
Start: 2025-05-14 | End: 2025-05-28

## 2025-05-14 RX ORDER — PAROXETINE 10 MG/1
10 TABLET, FILM COATED ORAL DAILY
COMMUNITY
Start: 2025-05-08 | End: 2026-05-08

## 2025-05-14 RX ORDER — TETRACYCLINE HYDROCHLORIDE 500 MG/1
500 CAPSULE ORAL 3 TIMES DAILY
Qty: 42 CAPSULE | Refills: 0 | Status: SHIPPED | OUTPATIENT
Start: 2025-05-14 | End: 2025-05-28

## 2025-05-14 RX ORDER — OMEPRAZOLE 40 MG/1
40 CAPSULE, DELAYED RELEASE ORAL DAILY
Qty: 14 CAPSULE | Refills: 0 | Status: SHIPPED | OUTPATIENT
Start: 2025-05-14 | End: 2025-05-28

## 2025-05-14 RX ORDER — METOPROLOL TARTRATE 100 MG/1
1 TABLET ORAL 2 TIMES DAILY
COMMUNITY
Start: 2025-04-23

## 2025-05-15 NOTE — PROGRESS NOTES
"Name: Norman Walsh      : 1935      MRN: [id number]  Encounter Provider: Benjamín Montaño DO  Encounter Date: 2025   Encounter department: [facility]  :  Assessment & Plan  H. pylori infection  The patient was seen by GI and H.Pylori antibodies done secondary to the patient being unable to do an EGD.  (+) EGD  The patient's wife wants treatment regardless of specificity of the test and possible colonization  Treated  Orders:  •  omeprazole (PriLOSEC) 40 MG capsule; Take 1 capsule (40 mg total) by mouth daily for 14 days  •  tetracycline (ACHROMYCIN,SUMYCIN) 500 MG capsule; Take 1 capsule (500 mg total) by mouth 3 (three) times a day for 14 days  •  metroNIDAZOLE (FLAGYL) 500 mg tablet; Take 1 tablet (500 mg total) by mouth every 8 (eight) hours for 14 days  •  bismuth subsalicylate (PEPTO BISMOL) 524 mg/30 mL oral suspension; Take 15 mL (262 mg total) by mouth every 6 (six) hours as needed for indigestion      Assessment & Plan           History of Present Illness   HPI  Review of Systems   Constitutional:  Negative for chills and fever.   HENT:  Negative for ear pain and sore throat.    Eyes:  Negative for pain and visual disturbance.   Respiratory:  Negative for cough and shortness of breath.    Cardiovascular:  Negative for chest pain and palpitations.   Gastrointestinal:  Negative for abdominal pain and vomiting.   Genitourinary:  Negative for dysuria and hematuria.   Musculoskeletal:  Negative for arthralgias and back pain.   Skin:  Negative for color change and rash.   Neurological:  Negative for seizures and syncope.   All other systems reviewed and are negative.      Objective   /82 (BP Location: Left arm, Patient Position: Sitting)   Pulse 65   Temp (!) 96.5 °F (35.8 °C) (Tympanic)   Ht 5' 10\" (1.778 m)   Wt 86.2 kg (190 lb)   SpO2 97%   BMI 27.26 kg/m²      Physical Exam  Vitals and nursing note reviewed.   Constitutional:       General: He is not in acute " distress.     Appearance: He is well-developed.   HENT:      Head: Normocephalic and atraumatic.     Eyes:      Conjunctiva/sclera: Conjunctivae normal.       Cardiovascular:      Rate and Rhythm: Normal rate and regular rhythm.      Heart sounds: No murmur heard.  Pulmonary:      Effort: Pulmonary effort is normal. No respiratory distress.      Breath sounds: Normal breath sounds.   Abdominal:      Palpations: Abdomen is soft.      Tenderness: There is no abdominal tenderness.     Musculoskeletal:         General: No swelling.      Cervical back: Neck supple.     Skin:     General: Skin is warm and dry.      Capillary Refill: Capillary refill takes less than 2 seconds.     Neurological:      Mental Status: He is alert.     Psychiatric:         Mood and Affect: Mood normal.

## 2025-05-27 DIAGNOSIS — R39.9 UTI SYMPTOMS: Primary | ICD-10-CM

## 2025-05-30 ENCOUNTER — TELEPHONE (OUTPATIENT)
Age: 89
End: 2025-05-30

## 2025-05-30 NOTE — TELEPHONE ENCOUNTER
SPOKE WITH PT WIFE, PT JUST COMPLETED QUADRUPLE THERAPY FOR H. PYLORI. REPORTS DARK COLORED STOOL YESTERDAY, ADVISED THIS IS LIKELY FROM THE PEPTO BISMOL TABLETS. PT WITH CONTINUED LUQ AND EPIGASTRIC DISCOMFORT, SCHEDULED FOR F/U OFFICE VISIT 6/13/25. PT SCHEDULED TO SEE PCP 6/2/25.

## 2025-06-02 ENCOUNTER — OFFICE VISIT (OUTPATIENT)
Dept: INTERNAL MEDICINE CLINIC | Facility: CLINIC | Age: 89
End: 2025-06-02
Payer: MEDICARE

## 2025-06-02 VITALS
HEIGHT: 70 IN | OXYGEN SATURATION: 98 % | HEART RATE: 92 BPM | WEIGHT: 185 LBS | DIASTOLIC BLOOD PRESSURE: 78 MMHG | BODY MASS INDEX: 26.48 KG/M2 | TEMPERATURE: 97.4 F | SYSTOLIC BLOOD PRESSURE: 142 MMHG

## 2025-06-02 DIAGNOSIS — A04.8 H. PYLORI INFECTION: Primary | ICD-10-CM

## 2025-06-02 PROCEDURE — 99214 OFFICE O/P EST MOD 30 MIN: CPT | Performed by: INTERNAL MEDICINE

## 2025-06-02 PROCEDURE — G2211 COMPLEX E/M VISIT ADD ON: HCPCS | Performed by: INTERNAL MEDICINE

## 2025-06-02 RX ORDER — RAMIPRIL 2.5 MG/1
1 CAPSULE ORAL DAILY
COMMUNITY
Start: 2025-04-24 | End: 2025-06-13 | Stop reason: ALTCHOICE

## 2025-06-02 NOTE — PROGRESS NOTES
"Name: Norman Walsh      : 1935      MRN: [id number]  Encounter Provider: Benjamín Montaño DO  Encounter Date: 2025   Encounter department: [facility]  :  Assessment & Plan  H. pylori infection  Completed antibiotics and is doing well   No concerns at this time  Abdominal pain is good  Spouse concern regarding Norman grabbing his stomach  No reproducible pain  Prior CT negative in 2025  CBC from 2025 negative  No change at this time.         Assessment & Plan           History of Present Illness   HPI  Review of Systems    Objective   /78 (BP Location: Left arm, Patient Position: Sitting)   Pulse 92   Temp (!) 97.4 °F (36.3 °C) (Tympanic)   Ht 5' 10\" (1.778 m)   Wt 83.9 kg (185 lb)   SpO2 98%   BMI 26.54 kg/m²      Physical Exam  Administrative Statements   I have spent a total time of 25 minutes in caring for this patient on the day of the visit/encounter including Prognosis.  "

## 2025-06-04 DIAGNOSIS — I10 BENIGN ESSENTIAL HYPERTENSION: Primary | ICD-10-CM

## 2025-06-04 NOTE — TELEPHONE ENCOUNTER
Patient was taking 25mg and the dose was changed to 100mg in the hospital 04/2025  Reason for call:   [x] Refill   [] Prior Auth  [] Other:     Office:   [x] PCP/Provider -  [facility]  Authorized By: Benjamín Montaño DO    [] Specialty/Provider -     Medication: metoprolol tartrate (LOPRESSOR) 100 mg tablet    Dose/Frequency: Take 1 tablet by mouth in the morning and 1 tablet before bedtime.    Quantity: 180    [facility] [address] [facility]   Does the patient have enough for 3 days?   [] Yes   [] No - Send as HP to POD    Mail Away Pharmacy   Does the patient have enough for 10 days?   [x] Yes   [] No - Send as HP to POD

## 2025-06-05 RX ORDER — METOPROLOL TARTRATE 100 MG/1
100 TABLET ORAL 2 TIMES DAILY
Qty: 180 TABLET | Refills: 1 | Status: SHIPPED | OUTPATIENT
Start: 2025-06-05

## 2025-06-13 ENCOUNTER — APPOINTMENT (OUTPATIENT)
Dept: RADIOLOGY | Facility: CLINIC | Age: 89
End: 2025-06-13
Attending: PHYSICIAN ASSISTANT
Payer: MEDICARE

## 2025-06-13 ENCOUNTER — APPOINTMENT (OUTPATIENT)
Dept: RADIOLOGY | Facility: CLINIC | Age: 89
End: 2025-06-13
Payer: MEDICARE

## 2025-06-13 ENCOUNTER — OFFICE VISIT (OUTPATIENT)
Dept: GASTROENTEROLOGY | Facility: CLINIC | Age: 89
End: 2025-06-13
Payer: MEDICARE

## 2025-06-13 VITALS
RESPIRATION RATE: 17 BRPM | OXYGEN SATURATION: 97 % | TEMPERATURE: 96.8 F | BODY MASS INDEX: 26.48 KG/M2 | HEART RATE: 62 BPM | DIASTOLIC BLOOD PRESSURE: 78 MMHG | HEIGHT: 70 IN | WEIGHT: 185 LBS | SYSTOLIC BLOOD PRESSURE: 128 MMHG

## 2025-06-13 DIAGNOSIS — K59.00 CONSTIPATION, UNSPECIFIED CONSTIPATION TYPE: ICD-10-CM

## 2025-06-13 DIAGNOSIS — A04.8 H. PYLORI INFECTION: ICD-10-CM

## 2025-06-13 DIAGNOSIS — R10.10 UPPER ABDOMINAL PAIN: ICD-10-CM

## 2025-06-13 DIAGNOSIS — R10.10 UPPER ABDOMINAL PAIN: Primary | ICD-10-CM

## 2025-06-13 PROCEDURE — 99213 OFFICE O/P EST LOW 20 MIN: CPT | Performed by: PHYSICIAN ASSISTANT

## 2025-06-13 PROCEDURE — 74018 RADEX ABDOMEN 1 VIEW: CPT

## 2025-06-13 NOTE — PROGRESS NOTES
Name: Norman Walsh      : 1935      MRN: [id number]  Encounter Provider: Kayley Singer PA-C  Encounter Date: 2025   Encounter department: [facility] [place]  Assessment & Plan  Upper abdominal pain  Pt with dementia having post-prandial abd discomfort (reaching for abd in this area following oral intake) as well as excess belching/hiccups at times.   Noninvasive investigation did identify an H. pylori infection as well as evidence of constipation on imaging.  3D imaging of the abdomen and pelvis did not identify any obvious pancreatitis, large peptic process, diverticulitis or colitis, or any obvious intra-abdominal mass.  He was tx for h pylori though is contiuing to have similar symptoms/behavior.   Continue famotidine at this time.   Check h pylori test for cure.   Repeat xray to evaluate stool burden.   High fiber diet.     If h pylori eradicated, would then plan to restart PPI therapy.   Orders:    XR abdomen 1 view kub; Future    H. pylori infection  Noted, hx of such on stool testing in 2025.   Tx with quad therapy.   Test for cure now.    Orders:    H. pylori antigen, stool; Future    Constipation, unspecified constipation type  History of such noted on UGI series in 2025.   Pt was originally on miralax, though was hospitalized, bowels currently seem to be moving without issue.   Repeat xray now to re-evaluate stool burden, as an appreciable stool burden may be contributing to pt's constellation of symptoms.   Orders:    XR abdomen 1 view kub; Future    We will follow up in 3-6 months to reassess symptoms.     History of Present Illness   Norman Walsh is a 89 y.o. male who presents for f/u for abd pain. Pmhx sig for atrial fib on Eliquis, HTN, HLD, dementia, BPH, prostate ca.     HPI  History obtained from: patient's child and patient's parent    Pt was initially evaluated in 2025. At that time, pt was having some excess hiccups, belching. Had UGI  series which suggested constipation. Had positive h pylori stool testing. He completed quad therapy.     Had prolonged hospitalization/recovery in 04/2025 for pna/sepsis.     06/13/25:     Pt tolerating oral intake, though reaching for left side of abdomen after eating. Also notes belching, hiccups after oral intake at times. Some issues with capsule form of pills, wife feels he wants to chew them. No dysphagia or odynophagia. No emesis. 5 lbs weight loss since last OV. Family thinks portion sizes are smaller.     Pt is having brown stool output. While on h pylori therapy stool was black though this resolved. No constipation. Wife thinks having almost daily stool. No complaints of constipation. No BRBPR endorsed.     NSAIDs: none  Etoh: none  Tobacco: none     02/2025: Hb 15.1, MCV 96, Plt 217, B12 237  02/2025: CT A/P: Mild bladder wall thickening may be due to chronic bladder outlet obstruction or cystitis. Punctate nonobstructing left renal calculus. No hydronephrosis or obstructive uropathy. Enlarged prostate.Colonic diverticulosis without acute diverticulitis  04/2025: UGI: Small reducible sliding-type hiatal hernia which is only evident when the patient is prone. Gastroesophageal reflux was not directly observed during the exam.  Mild intermittent tertiary contractions and mild esophageal stasis when the patient is in the prone position. Esophageal emptying is prompt while the patient was upright.  Unremarkable small bowel follow-through. Contrast reaches the colon after 30 minutes.  Moderate colonic stool burden suggestive of constipation  05/2025: Hb 14.4, MCV 96, Plt 325, ttg iga 0.4, iga 280, TSH 4.013     Review of Systems A complete review of systems is negative other than that noted above in the HPI.    Past Medical History   Past Medical History[1]  Past Surgical History[2]  Family History[3]   reports that he has never smoked. He has never been exposed to tobacco smoke. He has never used smokeless  "tobacco. He reports current alcohol use of about 3.0 standard drinks of alcohol per week. He reports that he does not use drugs.  Current Outpatient Medications   Medication Instructions    amLODIPine (NORVASC) 5 mg, Daily    donepezil (ARICEPT) 10 mg, Oral, Daily at bedtime    Eliquis 5 mg, 2 times daily    famotidine (PEPCID) 40 mg, Oral, Daily    metoprolol tartrate (LOPRESSOR) 100 mg, Oral, 2 times daily    Multiple Vitamins-Minerals (CENTRUM ADULTS PO) 1 tablet, Daily    Omega-3 1000 MG CAPS 1 capsule, 2 times daily    PARoxetine (PAXIL) 10 mg, Daily    ramipril (ALTACE) 10 MG capsule Daily    simvastatin (ZOCOR) 40 mg, Oral, Daily    tamsulosin (FLOMAX) 0.4 mg, Daily with dinner    traZODone (DESYREL) 50 mg, Daily at bedtime   Allergies[4]   Current Medications[5]  Objective   /78 (BP Location: Right arm, Patient Position: Sitting, Cuff Size: Adult)   Pulse 62   Temp (!) 96.8 °F (36 °C) (Temporal)   Resp 17   Ht 5' 10\" (1.778 m)   Wt 83.9 kg (185 lb)   SpO2 97%   BMI 26.54 kg/m²     Physical Exam  Vitals and nursing note reviewed.   Constitutional:       General: He is not in acute distress.     Appearance: He is well-developed.   HENT:      Head: Normocephalic and atraumatic.     Eyes:      General: No scleral icterus.     Conjunctiva/sclera: Conjunctivae normal.       Cardiovascular:      Rate and Rhythm: Normal rate.      Heart sounds: No murmur heard.  Pulmonary:      Effort: Pulmonary effort is normal. No respiratory distress.   Abdominal:      General: There is no distension.      Palpations: Abdomen is soft.      Tenderness: There is no abdominal tenderness. There is no guarding or rebound.     Skin:     General: Skin is warm and dry.      Coloration: Skin is not jaundiced.     Neurological:      Mental Status: He is alert. Mental status is at baseline.      Comments: Pleasantly confused   Psychiatric:         Mood and Affect: Mood normal.        Lab Results: I personally reviewed relevant " lab results. CBC/BMP: No new results in last 24 hours. , Creatinine Clearance: CrCl cannot be calculated (Patient's most recent lab result is older than the maximum 7 days allowed.)., LFTs: No new results in last 24 hours.     Radiology Results Review: I have reviewed radiology reports from [facility] including: CT abdomen/pelvis and xray(s).    **Please note:  Dictation voice to text software may have been used in the creation of this record.  Occasional wrong word or “sound alike” substitutions may have occurred due to the inherent limitations of voice recognition software.  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 by neurologist    Excessive sweating 2019    Frequency of micturition     Hypercholesteremia     Hypertension     Memory loss     Poor urinary stream    [2]   Past Surgical History:  Procedure Laterality Date    CATARACT EXTRACTION Bilateral 2019, 2019    HERNIA REPAIR     [3]   Family History  Problem Relation Name Age of Onset    Coronary artery disease Mother Yasmeen Walsh         , heart    No Known Problems Father      Colon cancer Family      Cancer Sister Erika Mcmahan     Cancer Sister Tiffanie Rubio    [4] No Known Allergies  [5]   Current Outpatient Medications   Medication Sig Dispense Refill    amLODIPine (NORVASC) 5 mg tablet 5 mg in the morning.  0    donepezil (ARICEPT) 10 mg tablet Take 1 tablet (10 mg total) by mouth daily at bedtime 200 tablet 1    Eliquis 5 MG Take 5 mg by mouth in the morning and 5 mg in the evening.      famotidine (PEPCID) 40 MG tablet take 1 tablet by mouth once daily 100 tablet 1    metoprolol tartrate (LOPRESSOR) 100 mg tablet Take 1 tablet (100 mg total) by mouth in the morning and 1  tablet (100 mg total) before bedtime. 180 tablet 1    Multiple Vitamins-Minerals (CENTRUM ADULTS PO) Take 1 tablet by mouth in the morning.      Omega-3 1000 MG CAPS Take 1 capsule by mouth in the morning and 1 capsule in the evening.      PARoxetine (PAXIL) 10 mg tablet Take 10 mg by mouth daily      ramipril (ALTACE) 10 MG capsule Take by mouth in the morning.      simvastatin (ZOCOR) 40 mg tablet take 1 tablet by mouth once daily 90 tablet 1    tamsulosin (FLOMAX) 0.4 mg Take 0.4 mg by mouth daily with dinner      traZODone (DESYREL) 50 mg tablet Take 50 mg by mouth daily at bedtime       No current facility-administered medications for this visit.

## 2025-06-20 ENCOUNTER — RESULTS FOLLOW-UP (OUTPATIENT)
Dept: GASTROENTEROLOGY | Facility: CLINIC | Age: 89
End: 2025-06-20

## 2025-06-20 NOTE — TELEPHONE ENCOUNTER
Xray results and recommendations reviewed with pt's wife. She verbalized understanding and is agreeable.

## 2025-06-24 DIAGNOSIS — E78.5 HYPERLIPIDEMIA, UNSPECIFIED HYPERLIPIDEMIA TYPE: ICD-10-CM

## 2025-06-24 DIAGNOSIS — R41.3 SHORT-TERM MEMORY LOSS: ICD-10-CM

## 2025-06-25 RX ORDER — DONEPEZIL HYDROCHLORIDE 10 MG/1
10 TABLET, FILM COATED ORAL
Qty: 90 TABLET | Refills: 1 | Status: SHIPPED | OUTPATIENT
Start: 2025-06-25

## 2025-06-25 RX ORDER — SIMVASTATIN 40 MG
40 TABLET ORAL DAILY
Qty: 90 TABLET | Refills: 1 | Status: SHIPPED | OUTPATIENT
Start: 2025-06-25

## 2025-07-03 ENCOUNTER — APPOINTMENT (OUTPATIENT)
Dept: LAB | Facility: CLINIC | Age: 89
End: 2025-07-03
Payer: MEDICARE

## 2025-07-03 DIAGNOSIS — A04.8 H. PYLORI INFECTION: ICD-10-CM

## 2025-07-03 PROCEDURE — 87338 HPYLORI STOOL AG IA: CPT

## 2025-07-04 LAB — H PYLORI AG STL QL IA: NEGATIVE

## 2025-07-09 DIAGNOSIS — K30 INDIGESTION: Primary | ICD-10-CM

## 2025-07-09 RX ORDER — OMEPRAZOLE 20 MG/1
20 CAPSULE, DELAYED RELEASE ORAL DAILY
Qty: 90 CAPSULE | Refills: 0 | Status: SHIPPED | OUTPATIENT
Start: 2025-07-09

## 2025-07-09 NOTE — TELEPHONE ENCOUNTER
Spoke with patients wife, agreeable to starting patient on omeprazole. Would like this sent to [facility]    Pended 20mg with 90 day